# Patient Record
Sex: FEMALE | Race: WHITE | NOT HISPANIC OR LATINO | Employment: UNEMPLOYED | URBAN - METROPOLITAN AREA
[De-identification: names, ages, dates, MRNs, and addresses within clinical notes are randomized per-mention and may not be internally consistent; named-entity substitution may affect disease eponyms.]

---

## 2017-03-21 ENCOUNTER — TELEPHONE (OUTPATIENT)
Dept: OBSTETRICS AND GYNECOLOGY | Facility: CLINIC | Age: 52
End: 2017-03-21

## 2017-03-21 DIAGNOSIS — Z12.31 ENCOUNTER FOR SCREENING MAMMOGRAM FOR BREAST CANCER: Primary | ICD-10-CM

## 2017-03-21 NOTE — TELEPHONE ENCOUNTER
----- Message from Josefina Garcia MA sent at 3/21/2017  2:28 PM CDT -----  Contact: self  Kayla Gray  MRN: [id number]  : 1965  PCP: Kole Shelley  Home Phone      [phone]  Work Phone      Not on file.  Mobile          [phone]      MESSAGE:   Please link patient mammogram order to her appointment thanks

## 2017-04-03 ENCOUNTER — HOSPITAL ENCOUNTER (OUTPATIENT)
Dept: RADIOLOGY | Facility: HOSPITAL | Age: 52
Discharge: HOME | End: 2017-04-03
Attending: OBSTETRICS & GYNECOLOGY
Payer: COMMERCIAL

## 2017-04-03 ENCOUNTER — OFFICE VISIT (OUTPATIENT)
Dept: OBSTETRICS AND GYNECOLOGY | Facility: CLINIC | Age: 52
End: 2017-04-03
Payer: COMMERCIAL

## 2017-04-03 ENCOUNTER — PROCEDURE VISIT (OUTPATIENT)
Dept: OBSTETRICS AND GYNECOLOGY | Facility: CLINIC | Age: 52
End: 2017-04-03
Payer: COMMERCIAL

## 2017-04-03 VITALS
HEIGHT: 61 IN | DIASTOLIC BLOOD PRESSURE: 78 MMHG | HEART RATE: 84 BPM | SYSTOLIC BLOOD PRESSURE: 120 MMHG | WEIGHT: 132 LBS | BODY MASS INDEX: 24.92 KG/M2

## 2017-04-03 DIAGNOSIS — Z12.31 ENCOUNTER FOR SCREENING MAMMOGRAM FOR BREAST CANCER: ICD-10-CM

## 2017-04-03 DIAGNOSIS — N83.202 CYST OF LEFT OVARY: Primary | ICD-10-CM

## 2017-04-03 DIAGNOSIS — N83.202 CYST OF LEFT OVARY: ICD-10-CM

## 2017-04-03 PROCEDURE — 1160F RVW MEDS BY RX/DR IN RCRD: CPT | Mod: S$GLB,,, | Performed by: OBSTETRICS & GYNECOLOGY

## 2017-04-03 PROCEDURE — 99202 OFFICE O/P NEW SF 15 MIN: CPT | Mod: S$GLB,,, | Performed by: OBSTETRICS & GYNECOLOGY

## 2017-04-03 PROCEDURE — 99999 PR PBB SHADOW E&M-EST. PATIENT-LVL II: CPT | Mod: PBBFAC,,, | Performed by: OBSTETRICS & GYNECOLOGY

## 2017-04-03 PROCEDURE — 77067 SCR MAMMO BI INCL CAD: CPT | Mod: TC

## 2017-04-03 PROCEDURE — 77067 SCR MAMMO BI INCL CAD: CPT | Mod: 26,,, | Performed by: RADIOLOGY

## 2017-04-03 PROCEDURE — 77063 BREAST TOMOSYNTHESIS BI: CPT | Mod: 26,,, | Performed by: RADIOLOGY

## 2017-04-03 PROCEDURE — 76830 TRANSVAGINAL US NON-OB: CPT | Mod: S$GLB,,, | Performed by: OBSTETRICS & GYNECOLOGY

## 2017-04-03 NOTE — PROGRESS NOTES
Subjective:    Patient ID: Kayla Gray is a 52 y.o. y.o. female    Chief Complaint:   Chief Complaint   Patient presents with    Ovarian Cyst     mri        History of Present Illness:  Kayla presents today for evaluation of a left ovarian cyst identified on MRI done recently for bilateral hip pain. She is still having regular menses. She denies pain, heavy bleeding, pelvic pressure.       Review of Systems   Constitutional: Negative for activity change, appetite change, chills, diaphoresis, fatigue, fever and unexpected weight change.   HENT: Negative for mouth sores and tinnitus.    Eyes: Negative for discharge and visual disturbance.   Respiratory: Negative for cough, shortness of breath and wheezing.    Cardiovascular: Negative for chest pain, palpitations and leg swelling.   Gastrointestinal: Negative for abdominal pain, blood in stool, constipation, diarrhea, nausea and vomiting.   Endocrine: Negative for diabetes, hair loss, hot flashes, hyperthyroidism and hypothyroidism.   Genitourinary: Negative for decreased libido, dyspareunia, dysuria, flank pain, frequency, genital sores, hematuria, menorrhagia, menstrual problem, pelvic pain, urgency, vaginal bleeding, vaginal discharge, vaginal pain, urinary incontinence, postcoital bleeding and vaginal odor.   Musculoskeletal: Positive for joint swelling. Negative for back pain and myalgias.   Skin:  Negative for rash, no acne and hair changes.   Neurological: Negative for seizures, syncope, numbness and headaches.   Hematological: Negative for adenopathy. Does not bruise/bleed easily.   Psychiatric/Behavioral: Negative for sleep disturbance. The patient is not nervous/anxious.    Breast: Negative for breast pain and nipple discharge          Objective:    Vital Signs:  Vitals:    04/03/17 1352   BP: 120/78   Pulse: 84       Physical Exam:  General:  alert,normal appearing gravid female   Skin:  Skin color, texture, turgor normal. No rashes or lesions    Abdomen: soft, non-tender. Bowel sounds normal. No masses,  no organomegaly   Pelvis: External genitalia: normal general appearance  Urinary system: urethral meatus normal, bladder nontender  Vaginal: normal mucosa without prolapse or lesions  Cervix: normal appearance  Uterus: normal single, nontender  Adnexa: normal bimanual exam         Assessment:      1. Cyst of left ovary          Plan:      Cyst of left ovary  -     US OB/GYN Procedure ([facility]); Future; Expected date: 4/3/17

## 2017-04-04 ENCOUNTER — LAB VISIT (OUTPATIENT)
Dept: LAB | Facility: HOSPITAL | Age: 52
End: 2017-04-04
Attending: OBSTETRICS & GYNECOLOGY
Payer: COMMERCIAL

## 2017-04-04 DIAGNOSIS — N83.202 CYST OF LEFT OVARY: ICD-10-CM

## 2017-04-04 PROCEDURE — 36415 COLL VENOUS BLD VENIPUNCTURE: CPT

## 2017-04-04 PROCEDURE — 86304 IMMUNOASSAY TUMOR CA 125: CPT

## 2017-04-05 LAB — CANCER AG125 SERPL-ACNC: 65 U/ML

## 2017-04-21 ENCOUNTER — INITIAL CONSULT (OUTPATIENT)
Dept: GYNECOLOGIC ONCOLOGY | Facility: CLINIC | Age: 52
End: 2017-04-21
Attending: OBSTETRICS & GYNECOLOGY
Payer: COMMERCIAL

## 2017-04-21 ENCOUNTER — TELEPHONE (OUTPATIENT)
Dept: GYNECOLOGIC ONCOLOGY | Facility: CLINIC | Age: 52
End: 2017-04-21

## 2017-04-21 DIAGNOSIS — R19.00 PELVIC MASS IN FEMALE: Primary | ICD-10-CM

## 2017-04-21 DIAGNOSIS — R97.1 ELEVATED CA-125: ICD-10-CM

## 2017-04-21 PROCEDURE — 99245 OFF/OP CONSLTJ NEW/EST HI 55: CPT | Mod: S$GLB,,, | Performed by: OBSTETRICS & GYNECOLOGY

## 2017-04-21 NOTE — LETTER
April 21, 2017      Ashvin Chakraborty MD  [address] - Gynecologic Oncology  [address], [address]  [facility] [address]  Phone: [phone]  Fax: [phone]          Patient: Kayla Gray   MR Number: [id number]   YOB: 1965   Date of Visit: 4/21/2017       Dear Dr. Ashvin Chakraborty:    Thank you for referring Kayla Gray to me for evaluation. Attached you will find relevant portions of my assessment and plan of care.    If you have questions, please do not hesitate to call me. I look forward to following Kayla Gray along with you.    Sincerely,    Cirilo Gong MD    Enclosure  CC:  No Recipients    If you would like to receive this communication electronically, please contact [email or web address] or [phone] to request more information on [facility] access.    For providers and/or their staff who would like to refer a patient to [facility], please contact us through our one-stop-shop provider referral line, [facility], at [phone].    If you feel you have received this communication in error or would no longer like to receive these types of communications, please e-mail [email or web address]

## 2017-04-21 NOTE — Clinical Note
Alyssa Hamilton: Thanks for sending her.  Going to do her on 5/18. Will let you know about path. ALEXANDRA

## 2017-05-11 ENCOUNTER — HOSPITAL ENCOUNTER (OUTPATIENT)
Dept: PREADMISSION TESTING | Facility: OTHER | Age: 52
Discharge: HOME | End: 2017-05-11
Attending: OBSTETRICS & GYNECOLOGY
Payer: COMMERCIAL

## 2017-05-11 ENCOUNTER — ANESTHESIA EVENT (OUTPATIENT)
Dept: SURGERY | Facility: OTHER | Age: 52
End: 2017-05-11
Payer: COMMERCIAL

## 2017-05-11 VITALS
SYSTOLIC BLOOD PRESSURE: 142 MMHG | HEART RATE: 81 BPM | HEIGHT: 61 IN | DIASTOLIC BLOOD PRESSURE: 73 MMHG | WEIGHT: 130 LBS | TEMPERATURE: 99 F | BODY MASS INDEX: 24.55 KG/M2 | OXYGEN SATURATION: 99 %

## 2017-05-11 DIAGNOSIS — R19.00 PELVIC MASS IN FEMALE: Primary | ICD-10-CM

## 2017-05-11 LAB
ABO + RH BLD: NORMAL
ANISOCYTOSIS BLD QL SMEAR: SLIGHT
BASOPHILS # BLD AUTO: 0.08 K/UL
BASOPHILS NFR BLD: 1.7 %
BLD GP AB SCN CELLS X3 SERPL QL: NORMAL
DIFFERENTIAL METHOD: ABNORMAL
EOSINOPHIL # BLD AUTO: 0.7 K/UL
EOSINOPHIL NFR BLD: 13.9 %
ERYTHROCYTE [DISTWIDTH] IN BLOOD BY AUTOMATED COUNT: 18 %
HCT VFR BLD AUTO: 33.5 %
HGB BLD-MCNC: 9.7 G/DL
HYPOCHROMIA BLD QL SMEAR: ABNORMAL
LYMPHOCYTES # BLD AUTO: 1.6 K/UL
LYMPHOCYTES NFR BLD: 32.4 %
MCH RBC QN AUTO: 19.5 PG
MCHC RBC AUTO-ENTMCNC: 29 %
MCV RBC AUTO: 67 FL
MONOCYTES # BLD AUTO: 0.4 K/UL
MONOCYTES NFR BLD: 7.5 %
NEUTROPHILS # BLD AUTO: 2.1 K/UL
NEUTROPHILS NFR BLD: 44.5 %
OVALOCYTES BLD QL SMEAR: ABNORMAL
PLATELET # BLD AUTO: 283 K/UL
PLATELET BLD QL SMEAR: ABNORMAL
PMV BLD AUTO: 10 FL
POIKILOCYTOSIS BLD QL SMEAR: SLIGHT
RBC # BLD AUTO: 4.98 M/UL
WBC # BLD AUTO: 4.81 K/UL

## 2017-05-11 PROCEDURE — 86850 RBC ANTIBODY SCREEN: CPT

## 2017-05-11 PROCEDURE — 85025 COMPLETE CBC W/AUTO DIFF WBC: CPT

## 2017-05-11 PROCEDURE — 86900 BLOOD TYPING SEROLOGIC ABO: CPT

## 2017-05-11 PROCEDURE — 36415 COLL VENOUS BLD VENIPUNCTURE: CPT

## 2017-05-11 RX ORDER — PREGABALIN 75 MG/1
150 CAPSULE ORAL
Status: ACTIVE | OUTPATIENT
Start: 2017-05-11 | End: 2017-05-11

## 2017-05-11 RX ORDER — ALBUTEROL SULFATE 0.83 MG/ML
2.5 SOLUTION RESPIRATORY (INHALATION)
Status: CANCELLED | OUTPATIENT
Start: 2017-05-11 | End: 2017-05-11

## 2017-05-11 RX ORDER — SODIUM CHLORIDE, SODIUM LACTATE, POTASSIUM CHLORIDE, CALCIUM CHLORIDE 600; 310; 30; 20 MG/100ML; MG/100ML; MG/100ML; MG/100ML
INJECTION, SOLUTION INTRAVENOUS CONTINUOUS
Status: CANCELLED | OUTPATIENT
Start: 2017-05-11

## 2017-05-11 RX ORDER — FLUCONAZOLE 200 MG/1
200 TABLET ORAL DAILY
COMMUNITY
End: 2017-05-30

## 2017-05-11 RX ORDER — MIDAZOLAM HYDROCHLORIDE 5 MG/ML
2 INJECTION INTRAMUSCULAR; INTRAVENOUS
Status: CANCELLED | OUTPATIENT
Start: 2017-05-11 | End: 2017-05-11

## 2017-05-11 RX ORDER — LIDOCAINE HYDROCHLORIDE 10 MG/ML
1 INJECTION, SOLUTION EPIDURAL; INFILTRATION; INTRACAUDAL; PERINEURAL ONCE
Status: CANCELLED | OUTPATIENT
Start: 2017-05-11 | End: 2017-05-11

## 2017-05-11 NOTE — DISCHARGE INSTRUCTIONS
PRE-ADMIT TESTING -  [phone]    [address]  [facility]        OUTPATIENT SURGERY UNIT - [phone]    Your surgery has been scheduled at [facility]. We are pleased to have the opportunity to serve you. For Further Information please call [phone].    On the day of surgery please report to the Information Desk on the 1st floor.    CONTACT YOUR PHYSICIAN'S OFFICE THE DAY PRIOR TO YOUR SURGERY TO OBTAIN YOUR ARRIVAL TIME.     The evening before surgery do not eat anything after 9 p.m. ( this includes hard candy, chewing gum and mints).  You may only have GATORADE, POWERADE AND WATER  from 9 p.m. until you leave your home.   DO NOT DRINK ANY LIQUIDS ON THE WAY TO THE HOSPITAL.      SPECIAL MEDICATION INSTRUCTIONS: TAKE medications checked off by the Anesthesiologist on your Medication List.    Angiogram Patients: Take medications as instructed by your physician, including aspirin.     Surgery Patients:    If you take ASPIRIN - Your PHYSICIAN/SURGEON will need to inform you IF/OR when you need to stop taking aspirin prior to your surgery.     Do Not take any medications containing IBUPROFEN.  Do Not Wear any make-up or dark nail polish   (especially eye make-up) to surgery. If you come to surgery with makeup on you will be required to remove the makeup or nail polish.    Do not shave your surgical area at least 5 days prior to your surgery. The surgical prep will be performed at the hospital according to Infection Control regulations.    Leave all valuables at home.   Do Not wear any jewelry or watches, including any metal in body piercings.  Contact Lens must be removed before surgery. Either do not wear the contact lens or bring a case and solution for storage.  Please bring a container for eyeglasses or dentures as required.  Bring any paperwork your physician has provided, such as consent forms,  history and physicals, doctor's orders, etc.   Bring comfortable clothes that  are loose fitting to wear upon discharge. Take into consideration the type of surgery being performed.  Maintain your diet as advised per your physician the day prior to surgery.      Adequate rest the night before surgery is advised.   Park in the Parking lot behind the hospital or in the [facility] across the street from the parking lot. Parking is complimentary.  If you will be discharged the same day as your procedure, please arrange for a responsible adult to drive you home or to accompany you if traveling by taxi.   YOU WILL NOT BE PERMITTED TO DRIVE OR TO LEAVE THE HOSPITAL ALONE AFTER SURGERY.   It is strongly recommended that you arrange for someone to remain with you for the first 24 hrs following your surgery.       Thank you for your cooperation.  The Staff of [facility].        Bathing Instructions                                                                 Please shower the evening before and morning of your procedure with    ANTIBACTERIAL SOAP. ( DIAL, etc )  Concentrate on the surgical area   for at least 3 minutes and rinse completely. Dry off as usual.   Do not use any deodorant, powder, body lotions, perfume, after shave or    cologne.

## 2017-05-11 NOTE — ANESTHESIA PREPROCEDURE EVALUATION
05/11/2017  Kayla Gray is a 52 y.o., female.    Anesthesia Evaluation    I have reviewed the Patient Summary Reports.    I have reviewed the Nursing Notes.   I have reviewed the Medications.     Review of Systems  Anesthesia Hx:  No problems with previous Anesthesia  History of prior surgery of interest to airway management or planning: Previous anesthesia: Epidural  CS with epidural.    Social:  Non-Smoker    Hematology/Oncology:         -- Anemia (hb 9.7): Oncology Comments: Pelvic mass Elevated Ca 125    EENT/Dental:EENT/Dental Normal   Cardiovascular:   Exercise tolerance: good    Pulmonary:   Asthma mild    Renal/:  Renal/ Normal     Hepatic/GI:   Hiatal Hernia, GERD, well controlled Has has 2 eso dilitations   Musculoskeletal:   Aseptic necrosis rt fem head   Neurological:  Neurology Normal    Endocrine:  Endocrine Normal    Dermatological:  Skin Normal    Psych:  Psychiatric Normal           Physical Exam  General:  Well nourished    Airway/Jaw/Neck:  Airway Findings: General Airway Assessment: Possible difficult intubation Mallampati: II  TM Distance: 4 - 6 cm      Dental:  Dental Findings: In tact        Mental Status:  Mental Status Findings:  Cooperative, Alert and Oriented         Anesthesia Plan  Type of Anesthesia, risks & benefits discussed:  Anesthesia Type:  general  Patient's Preference:   Intra-op Monitoring Plan:   Intra-op Monitoring Plan Comments:   Post Op Pain Control Plan:   Post Op Pain Control Plan Comments:   Induction:   IV  Beta Blocker:         Informed Consent: Patient understands risks and agrees with Anesthesia plan.  Questions answered. Anesthesia consent signed with patient.  ASA Score: 2     Day of Surgery Review of History & Physical:    H&P update referred to the surgeon.     Anesthesia Plan Notes: CBC today,asthma    Day of surgery update: hb  9.7        Ready For Surgery From Anesthesia Perspective.

## 2017-05-11 NOTE — IP AVS SNAPSHOT
[facility] Location ([facility])  [address] [place], [place] [address]  Phone: [phone]           Patient Discharge Instructions  Our goal is to set you up for success. This packet includes information on your condition, medications, and your home care. It will help you care for yourself to prevent having to return to the hospital.     Please ask your nurse if you have any questions.      There are many details to remember when preparing for your surgery. Here is what you will need to do, please ask your nurse if there are more specific instructions and if you have any questions:    1. Before procedure Do not smoke or drink alcoholic beverages 24 hours prior to your procedure. Do not eat or drink anything 8 hours before your procedure - this includes gum, mints, and candy.     2. Day of procedure Please remove all jewelry for the procedure. If you wear contact lenses, dentures, hearing aids or glasses, bring a container to put them in during your surgery and give to a family member.  If your doctor has scheduled you for an overnight stay, bring a small overnight bag with any personal items that you need.      3. After procedure  Make arrangements in advance for transportation home by a responsible adult. It is not safe to drive a vehicle during the 24 hours following surgery.     PLEASE NOTE: You may be contacted the day before your surgery to confirm your surgery date and arrival time. The Surgery schedule has many variables which may affect the time of your surgery case. Family members should be available if your surgery time changes.           [facility]  Unless otherwise directed by your provider, please   contact [facility], our nurse care line   that is available for 24/7 assistance.     [phone] (toll-free)     Registered nurses in the [facility]   provide: appointment scheduling, clinical advisement, health education, and other advisory services.                  **  Verify the list of medication(s) below is accurate and up to date. Carry this with you in case of emergency. If your medications have changed, please notify your healthcare provider.             Medication List      TAKE these medications        Additional Info                      albuterol 90 mcg/actuation inhaler   Commonly known as:  VENTOLIN HFA   Quantity:  18 g   Refills:  1   Dose:  2 puff    Instructions:  Inhale 2 puffs into the lungs every 6 (six) hours as needed.     Begin Date    AM    Noon    PM    Bedtime       esomeprazole 40 MG capsule   Commonly known as:  NEXIUM   Quantity:  90 capsule   Refills:  1   Dose:  40 mg    Instructions:  Take 1 capsule (40 mg total) by mouth before breakfast.     Begin Date    AM    Noon    PM    Bedtime       fluconazole 200 MG Tab   Commonly known as:  DIFLUCAN   Refills:  0   Dose:  200 mg    Instructions:  Take 200 mg by mouth once daily. X 3 days     Begin Date    AM    Noon    PM    Bedtime       fluticasone 50 mcg/actuation nasal spray   Commonly known as:  FLONASE   Quantity:  16 g   Refills:  3   Dose:  2 spray    Instructions:  2 sprays by Each Nare route once daily.     Begin Date    AM    Noon    PM    Bedtime                  Please bring to all follow up appointments:    1. A copy of your discharge instructions.  2. All medicines you are currently taking in their original bottles.  3. Identification and insurance card.    Please arrive 15 minutes ahead of scheduled appointment time.    Please call 24 hours in advance if you must reschedule your appointment and/or time.        Your Scheduled Appointments     Jun 07, 2017  2:30 PM CDT   New Patient with Esau Foster DO   [facility] ([facility])    [address] [place] [id number] [phone]              Your Future Surgeries/Procedures     May 18, 2017   Surgery with Cirilo Gong MD   [facility] ([facility])    [address]  [facility] [address]   [phone]                  Discharge Instructions       PRE-ADMIT TESTING -  [phone]    [address]  [facility]        OUTPATIENT SURGERY UNIT - [phone]    Your surgery has been scheduled at [facility]. We are pleased to have the opportunity to serve you. For Further Information please call [phone].    On the day of surgery please report to the Information Desk on the 1st floor.    CONTACT YOUR PHYSICIAN'S OFFICE THE DAY PRIOR TO YOUR SURGERY TO OBTAIN YOUR ARRIVAL TIME.     The evening before surgery do not eat anything after 9 p.m. ( this includes hard candy, chewing gum and mints).  You may only have GATORADE, POWERADE AND WATER  from 9 p.m. until you leave your home.   DO NOT DRINK ANY LIQUIDS ON THE WAY TO THE HOSPITAL.      SPECIAL MEDICATION INSTRUCTIONS: TAKE medications checked off by the Anesthesiologist on your Medication List.    Angiogram Patients: Take medications as instructed by your physician, including aspirin.     Surgery Patients:    If you take ASPIRIN - Your PHYSICIAN/SURGEON will need to inform you IF/OR when you need to stop taking aspirin prior to your surgery.     Do Not take any medications containing IBUPROFEN.  Do Not Wear any make-up or dark nail polish   (especially eye make-up) to surgery. If you come to surgery with makeup on you will be required to remove the makeup or nail polish.    Do not shave your surgical area at least 5 days prior to your surgery. The surgical prep will be performed at the hospital according to Infection Control regulations.    Leave all valuables at home.   Do Not wear any jewelry or watches, including any metal in body piercings.  Contact Lens must be removed before surgery. Either do not wear the contact lens or bring a case and solution for storage.  Please bring a container for eyeglasses or dentures as required.  Bring any paperwork your physician has provided,  "such as consent forms,  history and physicals, doctor's orders, etc.   Bring comfortable clothes that are loose fitting to wear upon discharge. Take into consideration the type of surgery being performed.  Maintain your diet as advised per your physician the day prior to surgery.      Adequate rest the night before surgery is advised.   Park in the Parking lot behind the hospital or in the [facility] across the street from the parking lot. Parking is complimentary.  If you will be discharged the same day as your procedure, please arrange for a responsible adult to drive you home or to accompany you if traveling by taxi.   YOU WILL NOT BE PERMITTED TO DRIVE OR TO LEAVE THE HOSPITAL ALONE AFTER SURGERY.   It is strongly recommended that you arrange for someone to remain with you for the first 24 hrs following your surgery.       Thank you for your cooperation.  The Staff of [facility].        Bathing Instructions                                                                 Please shower the evening before and morning of your procedure with    ANTIBACTERIAL SOAP. ( DIAL, etc )  Concentrate on the surgical area   for at least 3 minutes and rinse completely. Dry off as usual.   Do not use any deodorant, powder, body lotions, perfume, after shave or    cologne.                                                Admission Information     Date & Time Provider Department CSN    5/11/2017  9:00 AM Cirilo Gong MD [facility] [id number]      Care Providers     Provider Role Specialty Primary office phone    Cirilo Gong MD Attending Provider Obstetrics [phone]      Your Vitals Were     BP Pulse Temp Height Weight SpO2    142/73 81 98.8 °F (37.1 °C) (Oral) 5' 1" (1.549 m) 59 kg (130 lb) 99%    BMI                24.56 kg/m2          Recent Lab Values     No lab values to display.      Allergies as of 5/11/2017        Reactions    Asa [Aspirin] Swelling    Codeine Nausea " And Vomiting    Patient not sure what she can take      Advance Directives     An advance directive is a document which, in the event you are no longer able to make decisions for yourself, tells your healthcare team what kind of treatment you do or do not want to receive, or who you would like to make those decisions for you.  If you do not currently have an advance directive, [facility] encourages you to create one.  For more information call:  [phone]), [phone] ([phone]),  or log on to [email or web address].        Language Assistance Services     ATTENTION: Language assistance services are available, free of charge. Please call [phone].      ATENCIÓN: Si mariella sarabiacole, tiene a gil disposición servicios gratuitos de asistencia lingüística. Llame al [phone].     [facility] Ý: N?u b?n nói Ti?ng Vi?t, có các d?ch v? h? tr? ngôn ng? mi?n phí dành cho b?n. G?i s? [phone].         [facility] complies with applicable Federal civil rights laws and does not discriminate on the basis of race, color, national origin, age, disability, or sex.

## 2017-05-11 NOTE — PRE ADMISSION SCREENING
Spoke with [facility] in Dr. Gong' office and informed patient diagnosed with yeast infection.  Patient wants to know if she can start her Diflucan.  Per Dr. Gong, okay to start today and will proceed with surgery on 5/18.

## 2017-05-18 ENCOUNTER — ANESTHESIA (OUTPATIENT)
Dept: SURGERY | Facility: OTHER | Age: 52
End: 2017-05-18
Payer: COMMERCIAL

## 2017-05-18 ENCOUNTER — HOSPITAL ENCOUNTER (OUTPATIENT)
Facility: OTHER | Age: 52
Discharge: HOME | End: 2017-05-19
Attending: OBSTETRICS & GYNECOLOGY | Admitting: OBSTETRICS & GYNECOLOGY
Payer: COMMERCIAL

## 2017-05-18 ENCOUNTER — SURGERY (OUTPATIENT)
Age: 52
End: 2017-05-18

## 2017-05-18 DIAGNOSIS — R19.00 PELVIC MASS: ICD-10-CM

## 2017-05-18 DIAGNOSIS — R19.00 PELVIC MASS IN FEMALE: Primary | ICD-10-CM

## 2017-05-18 LAB
B-HCG UR QL: NEGATIVE
CTP QC/QA: YES

## 2017-05-18 PROCEDURE — 27201423 OPTIME MED/SURG SUP & DEVICES STERILE SUPPLY: Performed by: OBSTETRICS & GYNECOLOGY

## 2017-05-18 PROCEDURE — 63600175 PHARM REV CODE 636 W HCPCS: Performed by: NURSE ANESTHETIST, CERTIFIED REGISTERED

## 2017-05-18 PROCEDURE — 58573 TLH W/T/O UTERUS OVER 250 G: CPT | Mod: AS,,, | Performed by: PHYSICIAN ASSISTANT

## 2017-05-18 PROCEDURE — 37000008 HC ANESTHESIA 1ST 15 MINUTES: Performed by: OBSTETRICS & GYNECOLOGY

## 2017-05-18 PROCEDURE — 88307 TISSUE EXAM BY PATHOLOGIST: CPT | Mod: 26,,, | Performed by: PATHOLOGY

## 2017-05-18 PROCEDURE — 81025 URINE PREGNANCY TEST: CPT | Performed by: ANESTHESIOLOGY

## 2017-05-18 PROCEDURE — 94799 UNLISTED PULMONARY SVC/PX: CPT

## 2017-05-18 PROCEDURE — 37000009 HC ANESTHESIA EA ADD 15 MINS: Performed by: OBSTETRICS & GYNECOLOGY

## 2017-05-18 PROCEDURE — 71000039 HC RECOVERY, EACH ADD'L HOUR: Performed by: OBSTETRICS & GYNECOLOGY

## 2017-05-18 PROCEDURE — 36000713 HC OR TIME LEV V EA ADD 15 MIN: Performed by: OBSTETRICS & GYNECOLOGY

## 2017-05-18 PROCEDURE — 27100025 HC TUBING, SET FLUID WARMER: Performed by: NURSE ANESTHETIST, CERTIFIED REGISTERED

## 2017-05-18 PROCEDURE — 25000003 PHARM REV CODE 250: Performed by: OBSTETRICS & GYNECOLOGY

## 2017-05-18 PROCEDURE — 25000003 PHARM REV CODE 250: Performed by: ANESTHESIOLOGY

## 2017-05-18 PROCEDURE — 63600175 PHARM REV CODE 636 W HCPCS: Performed by: ANESTHESIOLOGY

## 2017-05-18 PROCEDURE — 63600175 PHARM REV CODE 636 W HCPCS: Performed by: PHYSICIAN ASSISTANT

## 2017-05-18 PROCEDURE — 25000003 PHARM REV CODE 250: Performed by: NURSE ANESTHETIST, CERTIFIED REGISTERED

## 2017-05-18 PROCEDURE — 71000033 HC RECOVERY, INTIAL HOUR: Performed by: OBSTETRICS & GYNECOLOGY

## 2017-05-18 PROCEDURE — 99900035 HC TECH TIME PER 15 MIN (STAT)

## 2017-05-18 PROCEDURE — 25000242 PHARM REV CODE 250 ALT 637 W/ HCPCS: Performed by: ANESTHESIOLOGY

## 2017-05-18 PROCEDURE — 94640 AIRWAY INHALATION TREATMENT: CPT

## 2017-05-18 PROCEDURE — 88307 TISSUE EXAM BY PATHOLOGIST: CPT | Performed by: PATHOLOGY

## 2017-05-18 PROCEDURE — 36000712 HC OR TIME LEV V 1ST 15 MIN: Performed by: OBSTETRICS & GYNECOLOGY

## 2017-05-18 PROCEDURE — 58662 LAPAROSCOPY EXCISE LESIONS: CPT | Mod: AS,51,, | Performed by: PHYSICIAN ASSISTANT

## 2017-05-18 PROCEDURE — 58662 LAPAROSCOPY EXCISE LESIONS: CPT | Mod: 51,,, | Performed by: OBSTETRICS & GYNECOLOGY

## 2017-05-18 PROCEDURE — 58573 TLH W/T/O UTERUS OVER 250 G: CPT | Mod: ,,, | Performed by: OBSTETRICS & GYNECOLOGY

## 2017-05-18 RX ORDER — SODIUM CHLORIDE, SODIUM LACTATE, POTASSIUM CHLORIDE, CALCIUM CHLORIDE 600; 310; 30; 20 MG/100ML; MG/100ML; MG/100ML; MG/100ML
INJECTION, SOLUTION INTRAVENOUS CONTINUOUS
Status: DISCONTINUED | OUTPATIENT
Start: 2017-05-18 | End: 2017-05-18

## 2017-05-18 RX ORDER — SIMETHICONE 80 MG
80 TABLET,CHEWABLE ORAL EVERY 4 HOURS PRN
Status: DISCONTINUED | OUTPATIENT
Start: 2017-05-18 | End: 2017-05-19 | Stop reason: HOSPADM

## 2017-05-18 RX ORDER — ALBUTEROL SULFATE 90 UG/1
2 AEROSOL, METERED RESPIRATORY (INHALATION) EVERY 4 HOURS PRN
Status: DISCONTINUED | OUTPATIENT
Start: 2017-05-18 | End: 2017-05-19 | Stop reason: HOSPADM

## 2017-05-18 RX ORDER — SODIUM CHLORIDE 9 MG/ML
INJECTION, SOLUTION INTRAVENOUS CONTINUOUS
Status: DISCONTINUED | OUTPATIENT
Start: 2017-05-18 | End: 2017-05-19

## 2017-05-18 RX ORDER — ROCURONIUM BROMIDE 10 MG/ML
INJECTION, SOLUTION INTRAVENOUS
Status: DISCONTINUED | OUTPATIENT
Start: 2017-05-18 | End: 2017-05-18

## 2017-05-18 RX ORDER — FENTANYL CITRATE 50 UG/ML
25 INJECTION, SOLUTION INTRAMUSCULAR; INTRAVENOUS EVERY 5 MIN PRN
Status: DISCONTINUED | OUTPATIENT
Start: 2017-05-18 | End: 2017-05-18 | Stop reason: HOSPADM

## 2017-05-18 RX ORDER — MIDAZOLAM HYDROCHLORIDE 5 MG/ML
2 INJECTION INTRAMUSCULAR; INTRAVENOUS
Status: COMPLETED | OUTPATIENT
Start: 2017-05-18 | End: 2017-05-18

## 2017-05-18 RX ORDER — OXYCODONE AND ACETAMINOPHEN 10; 325 MG/1; MG/1
1 TABLET ORAL EVERY 4 HOURS PRN
Status: DISCONTINUED | OUTPATIENT
Start: 2017-05-18 | End: 2017-05-19

## 2017-05-18 RX ORDER — PHENYLEPHRINE HYDROCHLORIDE 10 MG/ML
INJECTION INTRAVENOUS
Status: DISCONTINUED | OUTPATIENT
Start: 2017-05-18 | End: 2017-05-18

## 2017-05-18 RX ORDER — METOPROLOL TARTRATE 1 MG/ML
INJECTION, SOLUTION INTRAVENOUS
Status: DISCONTINUED | OUTPATIENT
Start: 2017-05-18 | End: 2017-05-18

## 2017-05-18 RX ORDER — FENTANYL CITRATE 50 UG/ML
INJECTION, SOLUTION INTRAMUSCULAR; INTRAVENOUS
Status: DISCONTINUED | OUTPATIENT
Start: 2017-05-18 | End: 2017-05-18

## 2017-05-18 RX ORDER — ONDANSETRON 8 MG/1
8 TABLET, ORALLY DISINTEGRATING ORAL EVERY 8 HOURS PRN
Status: DISCONTINUED | OUTPATIENT
Start: 2017-05-18 | End: 2017-05-19 | Stop reason: HOSPADM

## 2017-05-18 RX ORDER — PANTOPRAZOLE SODIUM 40 MG/1
40 TABLET, DELAYED RELEASE ORAL DAILY
Status: DISCONTINUED | OUTPATIENT
Start: 2017-05-19 | End: 2017-05-19 | Stop reason: HOSPADM

## 2017-05-18 RX ORDER — LIDOCAINE HYDROCHLORIDE 10 MG/ML
1 INJECTION, SOLUTION EPIDURAL; INFILTRATION; INTRACAUDAL; PERINEURAL ONCE
Status: DISCONTINUED | OUTPATIENT
Start: 2017-05-18 | End: 2017-05-18 | Stop reason: HOSPADM

## 2017-05-18 RX ORDER — HYDROMORPHONE HYDROCHLORIDE 1 MG/ML
0.5 INJECTION, SOLUTION INTRAMUSCULAR; INTRAVENOUS; SUBCUTANEOUS
Status: DISCONTINUED | OUTPATIENT
Start: 2017-05-18 | End: 2017-05-19 | Stop reason: HOSPADM

## 2017-05-18 RX ORDER — CEFAZOLIN SODIUM 2 G/50ML
2 SOLUTION INTRAVENOUS
Status: COMPLETED | OUTPATIENT
Start: 2017-05-18 | End: 2017-05-18

## 2017-05-18 RX ORDER — DIPHENHYDRAMINE HCL 25 MG
25 CAPSULE ORAL EVERY 4 HOURS PRN
Status: DISCONTINUED | OUTPATIENT
Start: 2017-05-18 | End: 2017-05-19 | Stop reason: HOSPADM

## 2017-05-18 RX ORDER — FLUTICASONE PROPIONATE 50 MCG
2 SPRAY, SUSPENSION (ML) NASAL DAILY
Status: DISCONTINUED | OUTPATIENT
Start: 2017-05-19 | End: 2017-05-19 | Stop reason: HOSPADM

## 2017-05-18 RX ORDER — SODIUM CHLORIDE 0.9 % (FLUSH) 0.9 %
3 SYRINGE (ML) INJECTION
Status: DISCONTINUED | OUTPATIENT
Start: 2017-05-18 | End: 2017-05-18

## 2017-05-18 RX ORDER — MEPERIDINE HYDROCHLORIDE 50 MG/ML
12.5 INJECTION INTRAMUSCULAR; INTRAVENOUS; SUBCUTANEOUS ONCE AS NEEDED
Status: DISCONTINUED | OUTPATIENT
Start: 2017-05-18 | End: 2017-05-18 | Stop reason: HOSPADM

## 2017-05-18 RX ORDER — DEXAMETHASONE SODIUM PHOSPHATE 4 MG/ML
INJECTION, SOLUTION INTRA-ARTICULAR; INTRALESIONAL; INTRAMUSCULAR; INTRAVENOUS; SOFT TISSUE
Status: DISCONTINUED | OUTPATIENT
Start: 2017-05-18 | End: 2017-05-18

## 2017-05-18 RX ORDER — ONDANSETRON 2 MG/ML
INJECTION INTRAMUSCULAR; INTRAVENOUS
Status: DISCONTINUED | OUTPATIENT
Start: 2017-05-18 | End: 2017-05-18

## 2017-05-18 RX ORDER — ALBUTEROL SULFATE 0.83 MG/ML
2.5 SOLUTION RESPIRATORY (INHALATION)
Status: COMPLETED | OUTPATIENT
Start: 2017-05-18 | End: 2017-05-18

## 2017-05-18 RX ORDER — OXYCODONE AND ACETAMINOPHEN 5; 325 MG/1; MG/1
1 TABLET ORAL EVERY 4 HOURS PRN
Status: DISCONTINUED | OUTPATIENT
Start: 2017-05-18 | End: 2017-05-19

## 2017-05-18 RX ORDER — OXYCODONE HYDROCHLORIDE 5 MG/1
5 TABLET ORAL
Status: DISCONTINUED | OUTPATIENT
Start: 2017-05-18 | End: 2017-05-18 | Stop reason: HOSPADM

## 2017-05-18 RX ORDER — ALBUTEROL SULFATE 90 UG/1
2 AEROSOL, METERED RESPIRATORY (INHALATION) EVERY 4 HOURS PRN
Status: DISCONTINUED | OUTPATIENT
Start: 2017-05-18 | End: 2017-05-18 | Stop reason: SDUPTHER

## 2017-05-18 RX ORDER — GLYCOPYRROLATE 0.2 MG/ML
INJECTION INTRAMUSCULAR; INTRAVENOUS
Status: DISCONTINUED | OUTPATIENT
Start: 2017-05-18 | End: 2017-05-18

## 2017-05-18 RX ORDER — PROPOFOL 10 MG/ML
VIAL (ML) INTRAVENOUS
Status: DISCONTINUED | OUTPATIENT
Start: 2017-05-18 | End: 2017-05-18

## 2017-05-18 RX ORDER — HYDROMORPHONE HYDROCHLORIDE 2 MG/ML
0.4 INJECTION, SOLUTION INTRAMUSCULAR; INTRAVENOUS; SUBCUTANEOUS EVERY 5 MIN PRN
Status: DISCONTINUED | OUTPATIENT
Start: 2017-05-18 | End: 2017-05-18 | Stop reason: HOSPADM

## 2017-05-18 RX ORDER — LIDOCAINE HYDROCHLORIDE 10 MG/ML
INJECTION, SOLUTION INTRAVENOUS
Status: DISCONTINUED | OUTPATIENT
Start: 2017-05-18 | End: 2017-05-18

## 2017-05-18 RX ORDER — IBUPROFEN 600 MG/1
600 TABLET ORAL EVERY 6 HOURS
Status: DISCONTINUED | OUTPATIENT
Start: 2017-05-18 | End: 2017-05-19 | Stop reason: HOSPADM

## 2017-05-18 RX ORDER — ONDANSETRON 2 MG/ML
4 INJECTION INTRAMUSCULAR; INTRAVENOUS ONCE AS NEEDED
Status: DISCONTINUED | OUTPATIENT
Start: 2017-05-18 | End: 2017-05-18 | Stop reason: HOSPADM

## 2017-05-18 RX ORDER — ACETAMINOPHEN 10 MG/ML
INJECTION, SOLUTION INTRAVENOUS
Status: DISCONTINUED | OUTPATIENT
Start: 2017-05-18 | End: 2017-05-18

## 2017-05-18 RX ORDER — NEOSTIGMINE METHYLSULFATE 1 MG/ML
INJECTION, SOLUTION INTRAVENOUS
Status: DISCONTINUED | OUTPATIENT
Start: 2017-05-18 | End: 2017-05-18

## 2017-05-18 RX ORDER — DEXTROSE MONOHYDRATE, SODIUM CHLORIDE, AND POTASSIUM CHLORIDE 50; 1.49; 4.5 G/1000ML; G/1000ML; G/1000ML
INJECTION, SOLUTION INTRAVENOUS CONTINUOUS
Status: DISCONTINUED | OUTPATIENT
Start: 2017-05-18 | End: 2017-05-19

## 2017-05-18 RX ADMIN — METOPROLOL TARTRATE 2.5 MG: 5 INJECTION, SOLUTION INTRAVENOUS at 10:05

## 2017-05-18 RX ADMIN — PHENYLEPHRINE HYDROCHLORIDE 100 MCG: 10 INJECTION INTRAVENOUS at 11:05

## 2017-05-18 RX ADMIN — FENTANYL CITRATE 50 MCG: 50 INJECTION, SOLUTION INTRAMUSCULAR; INTRAVENOUS at 10:05

## 2017-05-18 RX ADMIN — HYDROMORPHONE HYDROCHLORIDE 0.4 MG: 2 INJECTION INTRAMUSCULAR; INTRAVENOUS; SUBCUTANEOUS at 12:05

## 2017-05-18 RX ADMIN — ACETAMINOPHEN 1000 MG: 10 INJECTION, SOLUTION INTRAVENOUS at 10:05

## 2017-05-18 RX ADMIN — PROPOFOL 170 MG: 10 INJECTION, EMULSION INTRAVENOUS at 09:05

## 2017-05-18 RX ADMIN — PROPOFOL 30 MG: 10 INJECTION, EMULSION INTRAVENOUS at 11:05

## 2017-05-18 RX ADMIN — ONDANSETRON 4 MG: 2 INJECTION INTRAMUSCULAR; INTRAVENOUS at 11:05

## 2017-05-18 RX ADMIN — PHENYLEPHRINE HYDROCHLORIDE 100 MCG: 10 INJECTION INTRAVENOUS at 10:05

## 2017-05-18 RX ADMIN — CARBOXYMETHYLCELLULOSE SODIUM 2 DROP: 2.5 SOLUTION/ DROPS OPHTHALMIC at 09:05

## 2017-05-18 RX ADMIN — FENTANYL CITRATE 50 MCG: 50 INJECTION, SOLUTION INTRAMUSCULAR; INTRAVENOUS at 11:05

## 2017-05-18 RX ADMIN — GLYCOPYRROLATE 0.6 MG: 0.2 INJECTION, SOLUTION INTRAMUSCULAR; INTRAVENOUS at 11:05

## 2017-05-18 RX ADMIN — LIDOCAINE HYDROCHLORIDE 80 MG: 10 INJECTION, SOLUTION INTRAVENOUS at 09:05

## 2017-05-18 RX ADMIN — ROCURONIUM BROMIDE 30 MG: 10 INJECTION, SOLUTION INTRAVENOUS at 09:05

## 2017-05-18 RX ADMIN — SODIUM CHLORIDE, SODIUM LACTATE, POTASSIUM CHLORIDE, AND CALCIUM CHLORIDE: 600; 310; 30; 20 INJECTION, SOLUTION INTRAVENOUS at 09:05

## 2017-05-18 RX ADMIN — SODIUM CHLORIDE, SODIUM LACTATE, POTASSIUM CHLORIDE, AND CALCIUM CHLORIDE: 600; 310; 30; 20 INJECTION, SOLUTION INTRAVENOUS at 11:05

## 2017-05-18 RX ADMIN — FENTANYL CITRATE 100 MCG: 50 INJECTION, SOLUTION INTRAMUSCULAR; INTRAVENOUS at 09:05

## 2017-05-18 RX ADMIN — DEXTROSE MONOHYDRATE, SODIUM CHLORIDE, AND POTASSIUM CHLORIDE: 50; 4.5; 1.49 INJECTION, SOLUTION INTRAVENOUS at 04:05

## 2017-05-18 RX ADMIN — ALBUTEROL SULFATE 2.5 MG: 2.5 SOLUTION RESPIRATORY (INHALATION) at 08:05

## 2017-05-18 RX ADMIN — PROPOFOL 30 MG: 10 INJECTION, EMULSION INTRAVENOUS at 10:05

## 2017-05-18 RX ADMIN — CEFAZOLIN SODIUM 2 G: 2 SOLUTION INTRAVENOUS at 09:05

## 2017-05-18 RX ADMIN — PHENYLEPHRINE HYDROCHLORIDE 200 MCG: 10 INJECTION INTRAVENOUS at 10:05

## 2017-05-18 RX ADMIN — MIDAZOLAM HYDROCHLORIDE 2 MG: 5 INJECTION, SOLUTION INTRAMUSCULAR; INTRAVENOUS at 08:05

## 2017-05-18 RX ADMIN — NEOSTIGMINE METHYLSULFATE 5 MG: 1 INJECTION INTRAVENOUS at 11:05

## 2017-05-18 RX ADMIN — ROCURONIUM BROMIDE 10 MG: 10 INJECTION, SOLUTION INTRAVENOUS at 10:05

## 2017-05-18 RX ADMIN — SIMETHICONE CHEW TAB 80 MG 80 MG: 80 TABLET ORAL at 02:05

## 2017-05-18 RX ADMIN — DEXAMETHASONE SODIUM PHOSPHATE 4 MG: 4 INJECTION, SOLUTION INTRAMUSCULAR; INTRAVENOUS at 10:05

## 2017-05-18 RX ADMIN — ONDANSETRON 8 MG: 8 TABLET, ORALLY DISINTEGRATING ORAL at 02:05

## 2017-05-18 RX ADMIN — PROPOFOL 40 MG: 10 INJECTION, EMULSION INTRAVENOUS at 10:05

## 2017-05-18 NOTE — TRANSFER OF CARE
"Anesthesia Transfer of Care Note    Patient: Kayla Gray    Procedure(s) Performed: Procedure(s) (LRB):  XI ROBOTIC ASSISTED LAPAROSCOPIC HYSTERECTOMY (N/A)  XI ROBOT ASSISTED LAPAROSCOPIC SALPINGO-OOPHERECTOMY (Bilateral)    Patient location: PACU    Anesthesia Type: general    Transport from OR: Transported from OR on 2-3 L/min O2 by NC with adequate spontaneous ventilation    Post pain: adequate analgesia    Post assessment: no apparent anesthetic complications    Post vital signs: stable    Level of consciousness: sedated    Nausea/Vomiting: no nausea/vomiting    Complications: none    Transfer of care protocol was followed      Last vitals:   Visit Vitals    BP (!) 156/74    Pulse 86    Temp 36.7 °C (98 °F)    Resp 18    Ht 5' 1" (1.549 m)    Wt 59 kg (130 lb)    SpO2 98%    BMI 24.56 kg/m2     "

## 2017-05-18 NOTE — INTERVAL H&P NOTE
The patient has been examined and the H&P has been reviewed:    I concur with the findings and no changes have occurred since H&P was written.    Surgery risks, benefits and alternative options discussed and understood by patient.    Active Hospital Problems    Diagnosis  POA    Pelvic mass [R19.00]  Yes      Resolved Hospital Problems    Diagnosis Date Resolved POA   No resolved problems to display.     Faby Banda PA-C  Gynecologic Oncology  [phone]

## 2017-05-18 NOTE — OPERATIVE NOTE ADDENDUM
Certification of Assistant at Surgery       Surgery Date: 5/18/2017     Participating Surgeons:  Surgeon(s) and Role:     * Cirilo Gong MD - Primary     * Jostin Sanders MD - Resident - Assisting  *Faby Banda PA-C - Assisting    Procedures:  Procedure(s) (LRB):  XI ROBOTIC ASSISTED LAPAROSCOPIC HYSTERECTOMY (N/A)  XI ROBOT ASSISTED LAPAROSCOPIC SALPINGO-OOPHERECTOMY (Bilateral)    Assistant Surgeon's Certification of Necessity:  I understand that section 1842 (b) (6) (d) of the Social Security Act generally prohibits Medicare Part B reasonable charge payment for the services of assistants at surgery in teaching hospitals when qualified residents are available to furnish such services. I certify that the services for which payment is claimed were medically necessary, and that no qualified resident was available to perform the services. I further understand that these services are subject to post-payment review by the Medicare carrier.      Faby Banda PA-C    05/18/2017  12:01 PM

## 2017-05-18 NOTE — PLAN OF CARE
05/18/17 1714   Discharge Assessment   Assessment Type Discharge Planning Assessment   Confirmed/corrected address and phone number on facesheet? Yes   Assessment information obtained from? Patient;Caregiver;Medical Record   Communicated expected length of stay with patient/caregiver yes   Prior to hospitilization cognitive status: Alert/Oriented   Prior to hospitalization functional status: Independent   Current cognitive status: Alert/Oriented   Current Functional Status: Independent   Arrived From home or self-care  (MIKE Echeverria)   Lives With spouse   Able to Return to Prior Arrangements yes   Is patient able to care for self after discharge? Yes   How many people do you have in your home that can help with your care after discharge? 1   Patient currently being followed by outpatient case management? No   Patient currently receives home health services? No   Does the patient currently use HME? No   Discharge Plan A Home with family   Patient/Family In Agreement With Plan yes

## 2017-05-18 NOTE — IP AVS SNAPSHOT
[facility] Location ([facility])  [address] [place], [place] [id number]  Phone: [phone]           Patient Discharge Instructions   Our goal is to set you up for success. This packet includes information on your condition, medications, and your home care.  It will help you care for yourself to prevent having to return to the hospital.     Please ask your nurse if you have any questions.      There are many details to remember when preparing to leave the hospital. Here is what you will need to do:    1. Take your medicine. If you are prescribed medications, review your Medication List on the following pages. You may have new medications to  at the pharmacy and others that you'll need to stop taking. Review the instructions for how and when to take your medications. Talk with your doctor or nurses if you are unsure of what to do.     2. Go to your follow-up appointments. Specific follow-up information is listed in the following pages. Your may be contacted by a nurse or clinical provider about future appointments. Be sure we have all of the phone numbers to reach you. Please contact your provider's office if you are unable to make an appointment.     3. Watch for warning signs. Your doctor or nurse will give you detailed warning signs to watch for and when to call for assistance. These instructions may also include educational information about your condition. If you experience any of warning signs to your health, call your doctor.               ** Verify the list of medication(s) below is accurate and up to date. Carry this with you in case of emergency. If your medications have changed, please notify your healthcare provider.             Medication List      START taking these medications        Additional Info                      tramadol 50 mg tablet   Commonly known as:  ULTRAM   Quantity:  30 tablet   Refills:  0   Dose:  50 mg    Last time this was given:  50 mg on 5/19/2017  2:11 PM    Instructions:  Take 1 tablet (50 mg total) by mouth every 6 (six) hours as needed for Pain.     Begin Date    AM    Noon    PM    Bedtime         CHANGE how you take these medications        Additional Info                      esomeprazole 40 MG capsule   Commonly known as:  NEXIUM   Quantity:  90 capsule   Refills:  1   Dose:  40 mg   What changed:    - when to take this  - reasons to take this    Instructions:  Take 1 capsule (40 mg total) by mouth before breakfast.     Begin Date    AM    Noon    PM    Bedtime       fluticasone 50 mcg/actuation nasal spray   Commonly known as:  FLONASE   Quantity:  16 g   Refills:  3   Dose:  2 spray   What changed:    - when to take this  - reasons to take this    Last time this was given:  2 sprays on 5/19/2017  9:58 AM   Instructions:  2 sprays by Each Nare route once daily.     Begin Date    AM    Noon    PM    Bedtime         CONTINUE taking these medications        Additional Info                      albuterol 90 mcg/actuation inhaler   Commonly known as:  VENTOLIN HFA   Quantity:  18 g   Refills:  1   Dose:  2 puff    Instructions:  Inhale 2 puffs into the lungs every 6 (six) hours as needed.     Begin Date    AM    Noon    PM    Bedtime       fluconazole 200 MG Tab   Commonly known as:  DIFLUCAN   Refills:  0   Dose:  200 mg    Instructions:  Take 200 mg by mouth once daily. X 3 days     Begin Date    AM    Noon    PM    Bedtime         STOP taking these medications     fluticasone-salmeterol 250-50 mcg/dose 250-50 mcg/dose diskus inhaler   Commonly known as:  ADVAIR            Where to Get Your Medications      You can get these medications from any pharmacy     Bring a paper prescription for each of these medications     tramadol 50 mg tablet                  Please bring to all follow up appointments:    1. A copy of your discharge instructions.  2. All medicines you are currently taking in their original bottles.  3. Identification and insurance card.    Please  arrive 15 minutes ahead of scheduled appointment time.    Please call 24 hours in advance if you must reschedule your appointment and/or time.        Your Scheduled Appointments     Jun 07, 2017  2:30 PM CDT   New Patient with Esau Foster DO   [facility] ([facility])    [address]  [place] [id number] [phone]              Follow-up Information     Follow up with Cirilo Gong MD. Schedule an appointment as soon as possible for a visit in 3 weeks.    Specialties:  Obstetrics, Gynecology, Gynecologic Oncology    Why:  post op    Contact information:    [address]  [facility] [address]  [phone]          Discharge Instructions     Future Orders    Activity as tolerated     Call MD for:  difficulty breathing or increased cough     Call MD for:  increased confusion or weakness     Call MD for:  persistent dizziness, light-headedness, or visual disturbances     Call MD for:  persistent nausea and vomiting or diarrhea     Call MD for:  redness, tenderness, or signs of infection (pain, swelling, redness, odor or green/yellow discharge around incision site)     Call MD for:  severe persistent headache     Call MD for:  severe uncontrolled pain     Call MD for:  temperature >100.4     Diet general     Questions:    Total calories:      Fat restriction, if any:      Protein restriction, if any:      Na restriction, if any:      Fluid restriction:      Additional restrictions:          Discharge Instructions           Thank you for choosing [facility] as your Health Care Provider. [facility] strives to provide the best healthcare available to you. In the next few days you may receive a Survey, either by mail or email,  asking you to rate our care that was provided to you during your stay.  Please return the survey to us, as your feedback is important. We aim to meet your expectations of safe, quality health care.    From your [facility]  "Health Care Team.    Discharge References/Attachments     HYSTERECTOMY (LAPAROSCOPIC), DISCHARGE INSTRUCTIONS (ENGLISH)    TRAMADOL TABLETS (ENGLISH)        Primary Diagnosis     Your primary diagnosis was:  History Of Hysterectomy      Admission Information     Date & Time Provider Department CSN    5/18/2017  7:19 AM Cirilo Gong MD [facility] [id number]      Care Providers     Provider Role Specialty Primary office phone    Cirilo Gong MD Attending Provider Obstetrics [phone]    Cirilo Gong MD Surgeon  Obstetrics [phone]      Your Vitals Were     BP Pulse Temp Resp Height Weight    130/63 (BP Location: Left arm, Patient Position: Lying, BP Method: Automatic) 97 98.9 °F (37.2 °C) (Oral) 18 5' 1" (1.549 m) 59 kg (130 lb)    SpO2 BMI             97% 24.56 kg/m2         Recent Lab Values     No lab values to display.      Pending Labs     Order Current Status    Specimen to Pathology - Surgery In process      Allergies as of 5/19/2017        Reactions    Asa [Aspirin] Swelling    Codeine Nausea And Vomiting    Patient not sure what she can take      [facility]     [facility] Nurse Care Line - 24/7 Assistance  Unless otherwise directed by your provider, please contact [facility], our nurse care line that is available for 24/7 assistance.     Registered nurses in the [facility] Center provide clinical advisement, health education, appointment booking, and other advisory services.  Call for this free service at [phone].        Advance Directives     An advance directive is a document which, in the event you are no longer able to make decisions for yourself, tells your healthcare team what kind of treatment you do or do not want to receive, or who you would like to make those decisions for you.  If you do not currently have an advance directive, [facility] encourages you to create one.  For more information call:  [phone]), [phone] ([phone]),  or log on to [email or web address].        Language Assistance Services     ATTENTION: Language assistance services are available, free of charge. Please call [phone].      ATENCIÓN: Si habla lan, tiene a gil disposición servicios gratuitos de asistencia lingüística. Llame al [phone].     CHÚ Ý: N?u b?n nói Ti?ng Vi?t, có các d?ch v? h? tr? ngôn ng? mi?n phí dành cho b?n. G?i s? [phone].         [facility] complies with applicable Federal civil rights laws and does not discriminate on the basis of race, color, national origin, age, disability, or sex.

## 2017-05-18 NOTE — PLAN OF CARE
Patient prefers to have    Tima  present for discharge teaching. Please contact them @[phone].

## 2017-05-18 NOTE — H&P (VIEW-ONLY)
Subjective:      Patient ID: Kayla Gray is a 52 y.o. female.    Chief Complaint: elevated ca125 (Referred by Dr. Chakraborty)      HPI  Presents today at the request of Dr. Chakraborty secondary to an ovarian cyst recently detected on MRI for hip pain evaluation.  In addition, she also had a CT A/P that revealed the same, with no evidence of ascites or carcinomatosis.  Exam in his office was not concerning for cancer, and U/S revealed a small fibroid uterus, normal right ovary, and an 8 cm simple, unilocular cyst of the left ovary.  She had no free fluid or other concerning findings on U/S.  CA-125 was performed and was elevated at 65.  She denies abdominal complaints, early satiety, or changes in bladder or bowel function.  She still has menses, with LMP 3/27/18 , and takes OCP's that she started 3 weeks ago in an attempt to decrease the size of the ovarian cyst.  Only abdominal surgery was C/S x 4.  Only pertinent FH is a PGM diagnosed with  breast cancer.  No h/o ovarian cancers.  Review of Systems   Constitutional: Negative for activity change, appetite change, chills, fatigue and fever.   HENT: Negative for hearing loss, mouth sores, nosebleeds, sore throat and tinnitus.    Eyes: Negative for visual disturbance.   Respiratory: Negative for cough, chest tightness, shortness of breath and wheezing.    Cardiovascular: Negative for chest pain and leg swelling.   Gastrointestinal: Positive for diarrhea. Negative for abdominal distention, abdominal pain, blood in stool, constipation, nausea and vomiting.   Genitourinary: Negative for dysuria, flank pain, frequency, hematuria, pelvic pain, vaginal bleeding, vaginal discharge and vaginal pain.   Musculoskeletal: Negative for arthralgias and back pain.   Skin: Negative for rash.   Neurological: Negative for dizziness, seizures, syncope, weakness and numbness.   Hematological: Does not bruise/bleed easily.   Psychiatric/Behavioral: Negative for confusion and sleep  disturbance. The patient is not nervous/anxious.         Past Medical History:   Diagnosis Date    Asthma     Avascular necrosis of bone of right hip     GERD (gastroesophageal reflux disease)      Past Surgical History:   Procedure Laterality Date     SECTION, CLASSIC       Family History   Problem Relation Age of Onset    Heart disease Father     Cancer Maternal Grandfather     Cancer Paternal Grandmother     Heart disease Paternal Grandmother      Social History     Social History    Marital status:      Spouse name: N/A    Number of children: N/A    Years of education: N/A     Occupational History    Not on file.     Social History Main Topics    Smoking status: Never Smoker    Smokeless tobacco: Not on file    Alcohol use No    Drug use: No    Sexual activity: Not on file     Other Topics Concern    Not on file     Social History Narrative     Current Outpatient Prescriptions   Medication Sig    albuterol (VENTOLIN HFA) 90 mcg/actuation inhaler Inhale 2 puffs into the lungs every 6 (six) hours as needed.    esomeprazole (NEXIUM) 40 MG capsule Take 1 capsule (40 mg total) by mouth before breakfast.    fluticasone (FLONASE) 50 mcg/actuation nasal spray 2 sprays by Each Nare route once daily.    norethindrone-ethinyl estradiol (NECON 35, 28,) 1-35 mg-mcg per tablet Take 1 tablet by mouth once daily.    budesonide (PULMICORT) 0.5 mg/2 mL nebulizer solution Take 2 mLs (0.5 mg total) by nebulization once daily.    levalbuterol (XOPENEX) 1.25 mg/0.5 mL nebulizer solution Take 0.5 mLs (1.25 mg total) by nebulization every 4 (four) hours as needed for Wheezing.    [DISCONTINUED] fluticasone-salmeterol 250-50 mcg/dose (ADVAIR) 250-50 mcg/dose diskus inhaler Inhale 1 puff into the lungs 2 (two) times daily.     No current facility-administered medications for this visit.      Review of patient's allergies indicates:   Allergen Reactions    Asa [aspirin] Swelling    Codeine  Nausea And Vomiting       Objective:   Physical Exam:   Constitutional: She is oriented to person, place, and time. She appears well-developed and well-nourished. No distress.    HENT:   Head: Normocephalic and atraumatic.    Eyes: No scleral icterus.    Neck: Normal range of motion. Neck supple.    Cardiovascular: Normal rate and intact distal pulses.  Exam reveals no cyanosis and no edema.     Pulmonary/Chest: Effort normal. No respiratory distress. She exhibits no tenderness.        Abdominal: Soft. Normal appearance. She exhibits no distension, no fluid wave, no ascites and no mass (healed pfannensteil incision). There is no tenderness. There is no rigidity, no rebound and no guarding. No hernia.         Genitourinary: Rectum normal and vagina normal. Pelvic exam was performed with patient supine. There is no rash, tenderness or lesion on the right labia. There is no rash, tenderness or lesion on the left labia. Uterus is deviated, enlarged and hosting fibroids. Uterus is not fixed, not tender and not experiencing uterine prolapse. Cervix is normal. Right adnexum displays no mass, no tenderness and no fullness. Left adnexum displays fullness. Left adnexum displays no mass and no tenderness. No bleeding in the vagina. No vaginal discharge found. Labial bartholins normal.Cervix exhibits no motion tenderness, no discharge and no friability.        Uterus Size: 10 cm   Musculoskeletal: Normal range of motion and moves all extremeties. She exhibits no edema.      Lymphadenopathy:     She has no cervical adenopathy.        Right: No inguinal adenopathy present.        Left: No inguinal adenopathy present.    Neurological: She is alert and oriented to person, place, and time.    Skin: Skin is warm. No rash noted. No cyanosis or erythema.    Psychiatric: She has a normal mood and affect. Thought content normal.       Assessment:     1. Pelvic mass in female    2. Elevated CA-125        Plan:       Counseled the patient  on exam, lab, and imaging results.  Risk of malignancy is less than 5% with a unilocular cyst based on the [place] data.  I feel she has reasons for a mildly elevated CA-125 including fibroids as well as menstruation.  However, given her age and size greater than 5 cm, I did recommend surgery to definitively diagnose and treat her.  We discussed RALH/BSO/poss staging. The risks, benefits, and indications of the procedure were discussed with the patient and her .  These included bleeding, infection, damage to surrounding tissues, conversion to laparotomy, and the possibility of major complications including death.  She voiced understanding, all questions were answered and consents were signed.

## 2017-05-19 VITALS
HEIGHT: 61 IN | OXYGEN SATURATION: 97 % | SYSTOLIC BLOOD PRESSURE: 130 MMHG | BODY MASS INDEX: 24.55 KG/M2 | TEMPERATURE: 99 F | HEART RATE: 97 BPM | WEIGHT: 130 LBS | DIASTOLIC BLOOD PRESSURE: 63 MMHG | RESPIRATION RATE: 18 BRPM

## 2017-05-19 PROBLEM — Z90.710 S/P LAPAROSCOPIC HYSTERECTOMY: Status: ACTIVE | Noted: 2017-05-19

## 2017-05-19 PROCEDURE — 94799 UNLISTED PULMONARY SVC/PX: CPT

## 2017-05-19 PROCEDURE — 99900035 HC TECH TIME PER 15 MIN (STAT)

## 2017-05-19 PROCEDURE — 25000003 PHARM REV CODE 250: Performed by: OBSTETRICS & GYNECOLOGY

## 2017-05-19 RX ORDER — TRAMADOL HYDROCHLORIDE 50 MG/1
50 TABLET ORAL EVERY 6 HOURS PRN
Qty: 30 TABLET | Refills: 0 | Status: SHIPPED | OUTPATIENT
Start: 2017-05-19 | End: 2017-05-29

## 2017-05-19 RX ORDER — TRAMADOL HYDROCHLORIDE 50 MG/1
100 TABLET ORAL EVERY 6 HOURS PRN
Status: DISCONTINUED | OUTPATIENT
Start: 2017-05-19 | End: 2017-05-19 | Stop reason: HOSPADM

## 2017-05-19 RX ORDER — ACETAMINOPHEN 325 MG/1
650 TABLET ORAL EVERY 6 HOURS PRN
Status: DISCONTINUED | OUTPATIENT
Start: 2017-05-19 | End: 2017-05-19 | Stop reason: HOSPADM

## 2017-05-19 RX ORDER — TRAMADOL HYDROCHLORIDE 50 MG/1
50 TABLET ORAL EVERY 6 HOURS PRN
Status: DISCONTINUED | OUTPATIENT
Start: 2017-05-19 | End: 2017-05-19 | Stop reason: HOSPADM

## 2017-05-19 RX ORDER — ACETAMINOPHEN 325 MG/1
650 TABLET ORAL EVERY 4 HOURS PRN
Status: DISCONTINUED | OUTPATIENT
Start: 2017-05-19 | End: 2017-05-19

## 2017-05-19 RX ADMIN — DEXTROSE MONOHYDRATE, SODIUM CHLORIDE, AND POTASSIUM CHLORIDE: 50; 4.5; 1.49 INJECTION, SOLUTION INTRAVENOUS at 01:05

## 2017-05-19 RX ADMIN — FLUTICASONE PROPIONATE 2 SPRAY: 50 SPRAY, METERED NASAL at 09:05

## 2017-05-19 RX ADMIN — TRAMADOL HYDROCHLORIDE 50 MG: 50 TABLET, COATED ORAL at 02:05

## 2017-05-19 RX ADMIN — PANTOPRAZOLE SODIUM 40 MG: 40 TABLET, DELAYED RELEASE ORAL at 02:05

## 2017-05-19 NOTE — PROGRESS NOTES
Patient tolerating diet, ambulating around unit, pain controlled, voiding.  Discharge instructions reviewed with patient and spouse, all questions answered.  Iv removed and dressed with coban and gauze.  New medications and follow up appointments reviewed with patient.  Patient and spouse verbalized understanding.  Wheelchair transport requested for patient.

## 2017-05-19 NOTE — PLAN OF CARE
05/19/17 1520   Final Note   Assessment Type Final Discharge Note   Discharge Disposition Home   Discharge planning education complete? Yes   Hospital Follow Up  Appt(s) scheduled? Yes   Discharge plans and expectations educations in teach back method with documentation complete? Yes   Offered [facility] Bedside Delivery? n/a   Discharge/Hospital Encounter Summary to (non-[facility]) PCP Yes   Referral to Outpatient Case Management complete? n/a   Referral to / orders for Home Health Complete? n/a   30 day supply of medicines given at discharge, if documented non-compliance / non-adherence? n/a   Any social issues identified prior to discharge? n/a   Did you assess the readiness or willingness of the family or caregiver to support self management of care? Yes

## 2017-05-19 NOTE — PROGRESS NOTES
[facility]  Gynecologic Oncology  Progress Note      Patient Name: Kayla Gray  MRN: [id number]  Admission Date: 5/18/2017  Hospital Length of Stay: 0 days  Attending Provider: Cirilo Gong MD  Primary Care Provider: Kole Shelley MD  Principal Problem: Pelvic mass    Follow-up For: Procedure(s) (LRB):  XI ROBOTIC ASSISTED LAPAROSCOPIC HYSTERECTOMY (N/A)  XI ROBOT ASSISTED LAPAROSCOPIC SALPINGO-OOPHERECTOMY (Bilateral)  Post-Operative Day: 1 Day Post-Op  Subjective:      History of Present Illness:  Ms. Gray is a 52 yr old female, POD#1 s/p RATLH/BSO 2/2 adnexal mass    Hospital Course:  Ms. Gray is a 52 yr old female, POD#1 s/p RATLH/BSO 2/2 left adnexal mass. She tolerated the procedure well without complications. Please see operative note for further details. Her postop course was uncomplicated. She was discharged home on POD#1 once ambulating, voiding, tolerating PO and pain was well controlled. She will follow up with Dr. Gong in 4 weeks for a postop visit.    Interval History: Pt seen and examined. No acute events overnight. Some nausea postop but currently denies. Has tolerated clears. Cortes in place. Draining clear urine. UOP adequate overnight. Has not ambulated. Has not required PRN pain meds. Denies flatus or BM.    Scheduled Meds:   fluticasone  2 spray Each Nare Daily    ibuprofen  600 mg Oral Q6H    pantoprazole  40 mg Oral Daily     Continuous Infusions:   PRN Meds:acetaminophen, albuterol **AND** MDI Q4H PRN, diphenhydrAMINE, HYDROmorphone, ondansetron, oxycodone-acetaminophen, oxycodone-acetaminophen, promethazine (PHENERGAN) IVPB, simethicone    Review of patient's allergies indicates:   Allergen Reactions    Asa [aspirin] Swelling    Codeine Nausea And Vomiting     Patient not sure what she can take       Objective:     Vital Signs (Most Recent):  Temp: 98.7 °F (37.1 °C) (05/19/17 0704)  Pulse: 96 (05/19/17 0756)  Resp: 18 (05/19/17 0756)  BP: (!)  144/80 (05/19/17 0704)  SpO2: 99 % (05/19/17 0756) Vital Signs (24h Range):  Temp:  [97.6 °F (36.4 °C)-98.7 °F (37.1 °C)] 98.7 °F (37.1 °C)  Pulse:  [] 96  Resp:  [16-18] 18  SpO2:  [96 %-100 %] 99 %  BP: (136-164)/(67-84) 144/80     Weight: 59 kg (130 lb)  Body mass index is 24.56 kg/(m^2).    Intake/Output - Last 3 Shifts       05/17 0700 - 05/18 0659 05/18 0700 - 05/19 0659 05/19 0700 - 05/20 0659    I.V. (mL/kg)  3158.3 (53.6)     Total Intake(mL/kg)  3158.3 (53.6)     Urine (mL/kg/hr)  2925     Blood  100     Total Output   3025      Net   +133.3                      Physical Exam:   Constitutional: She is oriented to person, place, and time. She appears well-developed and well-nourished.    HENT:   Head: Normocephalic and atraumatic.      Cardiovascular: Normal rate, regular rhythm and normal heart sounds.     Pulmonary/Chest: Effort normal and breath sounds normal.        Abdominal:   Soft, non-distended, approp TTP. +BS. No rebound or guarding.             Musculoskeletal: Normal range of motion and moves all extremeties.       Neurological: She is alert and oriented to person, place, and time.    Skin: Skin is warm and dry.        Lines/Drains/Airways     Drain                 Urethral Catheter 05/18/17 0991 Straight-tip 16 Fr. less than 1 day          Peripheral Intravenous Line                 Peripheral IV - Single Lumen 05/18/17 0813 less than 1 day                Laboratory:  No AM labs        Assessment/Plan:     Asthma  Sats stable on RA  Continue albuterol PRN and fluticasone QD    GERD (gastroesophageal reflux disease)  Stable. No acute issues  Continue protonix QD    S/P laparoscopic hysterectomy with BSO- robotic assisted  Continue routine postop care  Declines narcotics. Discussed pain regimen upon discharge. Cannot take NSAIDs. Will ensure adequate pain control prior to discharge  Cortes in place. Draining clear urine. UOP adequate. DC Cortes with passive voiding trial  Some nausea postop.  Currently denies. Tolerating clears. ADAT. Saline lock IV once tolerating  Encourage ambulation  TEDs/SCDs    VTE Risk Mitigation         Ordered     Medium Risk of VTE  Once      05/18/17 1426     Place NURYS hose  Until discontinued      05/18/17 1426     Place sequential compression device  Until discontinued      05/18/17 1426          Was coates catheter removed? Yes    Jostin Sanders MD  Gynecologic Oncology  [facility]

## 2017-05-19 NOTE — PLAN OF CARE
Patient Name: Kayla Gray  MRN: [id number]  Admission Date: 5/18/2017  Hospital Length of Stay: 1 day  Attending Provider: Cirilo Gong MD  Primary Care Provider: Kole Shelley MD  Principal Problem: Pelvic mass     Follow-up For: Procedure(s) (LRB):  XI ROBOTIC ASSISTED LAPAROSCOPIC HYSTERECTOMY (N/A)  XI ROBOT ASSISTED LAPAROSCOPIC SALPINGO-OOPHERECTOMY (Bilateral)  Post-Operative Day: 1 Day Post-Op        Discharge Planning:  Patient admitted on 5-18-17  Chart reviewed today  Care plan discussed with treatment team      Current dispo - home soon (Mrs Infante is from [place], [place])  Case management to follow as needed  Consults following are: urology, case mgt.

## 2017-05-19 NOTE — PLAN OF CARE
05/19/17 1249   Discharge Reassessment   Assessment Type Discharge Planning Reassessment   Can the patient answer the patient profile reliably? Yes, cognitively intact   How does the patient rate their overall health at the present time? Good   Describe the patient's ability to walk at the present time. Minor restrictions or changes   How often would a person be available to care for the patient? Whenever needed   Number of comorbid conditions (as recorded on the chart) One   During the past month, has the patient often been bothered by feeling down, depressed or hopeless? No   During the past month, has the patient often been bothered by little interest or pleasure in doing things? No   Discharge plan remains the same: Yes   Involved the patient/caregiver in establishing a new discharge plan: Yes

## 2017-05-19 NOTE — PLAN OF CARE
Problem: Patient Care Overview  Goal: Plan of Care Review  Outcome: Ongoing (interventions implemented as appropriate)  No significant events overnight. Remains free from fall, injury, and skin breakdown. Voiding per coates. VSS on RA throughout the night. Denies pain. Incision/dressing CDI.  Plan of care reviewed with patient and all questions answered. Bed low, locked. Call light within reach. Purposeful rounding performed. Resting comfortably in bed, no other complaints at this time.

## 2017-05-19 NOTE — PLAN OF CARE
Problem: Patient Care Overview  Goal: Plan of Care Review  Outcome: Ongoing (interventions implemented as appropriate)  Patient on RA. No PRN Inhalers needed at this time. No distress. Will continue to monitor.

## 2017-05-19 NOTE — SUBJECTIVE & OBJECTIVE
Interval History: Pt seen and examined. No acute events overnight. Some nausea postop but currently denies. Has tolerated clears. Cortes in place. Draining clear urine. UOP adequate overnight. Has not ambulated. Has not required PRN pain meds. Denies flatus or BM.    Scheduled Meds:   fluticasone  2 spray Each Nare Daily    ibuprofen  600 mg Oral Q6H    pantoprazole  40 mg Oral Daily     Continuous Infusions:   PRN Meds:acetaminophen, albuterol **AND** MDI Q4H PRN, diphenhydrAMINE, HYDROmorphone, ondansetron, oxycodone-acetaminophen, oxycodone-acetaminophen, promethazine (PHENERGAN) IVPB, simethicone    Review of patient's allergies indicates:   Allergen Reactions    Asa [aspirin] Swelling    Codeine Nausea And Vomiting     Patient not sure what she can take       Objective:     Vital Signs (Most Recent):  Temp: 98.7 °F (37.1 °C) (05/19/17 0704)  Pulse: 96 (05/19/17 0756)  Resp: 18 (05/19/17 0756)  BP: (!) 144/80 (05/19/17 0704)  SpO2: 99 % (05/19/17 0756) Vital Signs (24h Range):  Temp:  [97.6 °F (36.4 °C)-98.7 °F (37.1 °C)] 98.7 °F (37.1 °C)  Pulse:  [] 96  Resp:  [16-18] 18  SpO2:  [96 %-100 %] 99 %  BP: (136-164)/(67-84) 144/80     Weight: 59 kg (130 lb)  Body mass index is 24.56 kg/(m^2).    Intake/Output - Last 3 Shifts       05/17 0700 - 05/18 0659 05/18 0700 - 05/19 0659 05/19 0700 - 05/20 0659    I.V. (mL/kg)  3158.3 (53.6)     Total Intake(mL/kg)  3158.3 (53.6)     Urine (mL/kg/hr)  2925     Blood  100     Total Output   3025      Net   +133.3                      Physical Exam:   Constitutional: She is oriented to person, place, and time. She appears well-developed and well-nourished.    HENT:   Head: Normocephalic and atraumatic.      Cardiovascular: Normal rate, regular rhythm and normal heart sounds.     Pulmonary/Chest: Effort normal and breath sounds normal.        Abdominal:   Soft, non-distended, approp TTP. +BS. No rebound or guarding.             Musculoskeletal: Normal range of motion  and moves all extremeties.       Neurological: She is alert and oriented to person, place, and time.    Skin: Skin is warm and dry.        Lines/Drains/Airways     Drain                 Urethral Catheter 05/18/17 0954 Straight-tip 16 Fr. less than 1 day          Peripheral Intravenous Line                 Peripheral IV - Single Lumen 05/18/17 0851 less than 1 day                Laboratory:  No AM labs

## 2017-05-19 NOTE — ASSESSMENT & PLAN NOTE
Continue routine postop care  Declines narcotics. Discussed pain regimen upon discharge. Cannot take NSAIDs. Will ensure adequate pain control prior to discharge  Cortes in place. Draining clear urine. UOP adequate. DC Cortes with passive voiding trial  Some nausea postop. Currently denies. Tolerating clears. ADAT. Saline lock IV once tolerating  Encourage ambulation  TEDs/SCDs

## 2017-05-22 NOTE — DISCHARGE SUMMARY
[facility]  Obstetrics & Gynecology  Discharge Summary    Patient Name: Kayla Gray  MRN: [id number]  Admission Date: 5/18/2017  Hospital Length of Stay: 0 days  Discharge Date and Time: 5/19/2017  4:55 PM  Attending Physician: No att. providers found   Discharging Provider: Jam Armijo MD  Primary Care Provider: Kole Shelley MD    HPI: 52 yr old female, POD#1 s/p RATLH/BSO 2/2 adnexal mass    Hospital Course: Patient presented for scheduled procedure. Patient was passed back to OR for robotic assisted TLH. Please see OP note for further details. Tolerated procedure well and patient was taken to recovery in a stable condition. Prior to discharge patient was able to void, ambulate, tolerate PO and pain was well controlled with PO meds. Patient was given tramadol due to unwillingness to accept narcotic pain medications. Patient was given routine post-op instructions for which patient voiced understanding. Patient was subsequently discharged home.      Procedure(s) (LRB):  XI ROBOTIC ASSISTED LAPAROSCOPIC HYSTERECTOMY (N/A)  XI ROBOT ASSISTED LAPAROSCOPIC SALPINGO-OOPHERECTOMY (Bilateral)      Consults: None    Significant Diagnostic Studies:   Labs: CBC No results for input(s): WBC, HGB, HCT, PLT in the last 48 hours.  Specimen (12h ago through future)    None          Pending Diagnostic Studies:     None        Final Active Diagnoses:    Diagnosis Date Noted POA    PRINCIPAL PROBLEM:  S/P laparoscopic hysterectomy with BSO- robotic assisted [Z90.710] 05/19/2017 Yes    GERD (gastroesophageal reflux disease) [K21.9]  Yes    Asthma [J45.909]  Yes      Problems Resolved During this Admission:    Diagnosis Date Noted Date Resolved POA        Discharged Condition: good    Disposition: Home or Self Care    Follow Up:  Follow-up Information     Cirilo Gong MD. Schedule an appointment as soon as possible for a visit in 3 weeks.    Specialties:  Obstetrics, Gynecology, Gynecologic  Oncology  Why:  post op  Contact information:  [address]  [facility] [address]  [phone]                 Patient Instructions:     Diet general     Activity as tolerated     Call MD for:  temperature >100.4     Call MD for:  persistent nausea and vomiting or diarrhea     Call MD for:  severe uncontrolled pain     Call MD for:  redness, tenderness, or signs of infection (pain, swelling, redness, odor or green/yellow discharge around incision site)     Call MD for:  difficulty breathing or increased cough     Call MD for:  severe persistent headache     Call MD for:  persistent dizziness, light-headedness, or visual disturbances     Call MD for:  increased confusion or weakness       Medications:  Reconciled Home Medications:   Discharge Medication List as of 5/19/2017  3:46 PM      START taking these medications    Details   tramadol (ULTRAM) 50 mg tablet Take 1 tablet (50 mg total) by mouth every 6 (six) hours as needed for Pain., Starting 5/19/2017, Until Mon 5/29/17, Print         CONTINUE these medications which have NOT CHANGED    Details   albuterol (VENTOLIN HFA) 90 mcg/actuation inhaler Inhale 2 puffs into the lungs every 6 (six) hours as needed., Starting 6/4/2014, Until Discontinued, Normal      esomeprazole (NEXIUM) 40 MG capsule Take 1 capsule (40 mg total) by mouth before breakfast., Starting 6/4/2014, Until Discontinued, Normal      fluconazole (DIFLUCAN) 200 MG Tab Take 200 mg by mouth once daily. X 3 days , Until Discontinued, Historical Med      fluticasone (FLONASE) 50 mcg/actuation nasal spray 2 sprays by Each Nare route once daily., Starting 6/4/2014, Until Discontinued, Normal         STOP taking these medications       fluticasone-salmeterol 250-50 mcg/dose (ADVAIR) 250-50 mcg/dose diskus inhaler Comments:   Reason for Stopping:               Jam Armijo MD  Obstetrics & Gynecology  [facility]

## 2017-05-23 ENCOUNTER — TELEPHONE (OUTPATIENT)
Dept: GYNECOLOGIC ONCOLOGY | Facility: CLINIC | Age: 52
End: 2017-05-23

## 2017-05-23 NOTE — TELEPHONE ENCOUNTER
Spoke to the patient and gave her pathology results.  She is doing well and is without issue since surgery.  Due to see me in 2 weeks.

## 2017-05-30 ENCOUNTER — OFFICE VISIT (OUTPATIENT)
Dept: GYNECOLOGIC ONCOLOGY | Facility: CLINIC | Age: 52
End: 2017-05-30
Payer: COMMERCIAL

## 2017-05-30 ENCOUNTER — TELEPHONE (OUTPATIENT)
Dept: GYNECOLOGIC ONCOLOGY | Facility: CLINIC | Age: 52
End: 2017-05-30

## 2017-05-30 VITALS
HEART RATE: 92 BPM | DIASTOLIC BLOOD PRESSURE: 67 MMHG | HEIGHT: 61 IN | BODY MASS INDEX: 23.73 KG/M2 | SYSTOLIC BLOOD PRESSURE: 138 MMHG | WEIGHT: 125.69 LBS

## 2017-05-30 VITALS
SYSTOLIC BLOOD PRESSURE: 138 MMHG | BODY MASS INDEX: 23.74 KG/M2 | WEIGHT: 125.69 LBS | HEART RATE: 92 BPM | DIASTOLIC BLOOD PRESSURE: 97 MMHG

## 2017-05-30 DIAGNOSIS — N93.9 VAGINAL BLEEDING: Primary | ICD-10-CM

## 2017-05-30 DIAGNOSIS — Z90.710 S/P LAPAROSCOPIC HYSTERECTOMY: Primary | ICD-10-CM

## 2017-05-30 PROCEDURE — 99499 UNLISTED E&M SERVICE: CPT | Mod: S$GLB,,, | Performed by: OBSTETRICS & GYNECOLOGY

## 2017-05-30 PROCEDURE — 99024 POSTOP FOLLOW-UP VISIT: CPT | Mod: S$GLB,,, | Performed by: OBSTETRICS & GYNECOLOGY

## 2017-05-30 PROCEDURE — 99999 PR PBB SHADOW E&M-EST. PATIENT-LVL II: CPT | Mod: PBBFAC,,, | Performed by: OBSTETRICS & GYNECOLOGY

## 2017-05-30 PROCEDURE — 99999 PR PBB SHADOW E&M-EST. PATIENT-LVL III: CPT | Mod: PBBFAC,,, | Performed by: OBSTETRICS & GYNECOLOGY

## 2017-05-30 RX ORDER — NORETHINDRONE AND ETHINYL ESTRADIOL 1 MG-35MCG
KIT ORAL
COMMUNITY
Start: 2017-05-23 | End: 2017-05-30

## 2017-05-30 RX ORDER — AMOXICILLIN 500 MG/1
CAPSULE ORAL
Refills: 0 | COMMUNITY
Start: 2017-05-05 | End: 2017-05-30

## 2017-05-30 RX ORDER — DIAZEPAM 10 MG/1
TABLET ORAL
COMMUNITY
Start: 2017-03-16 | End: 2017-05-30

## 2017-05-30 RX ORDER — HYDROCODONE BITARTRATE AND ACETAMINOPHEN 5; 325 MG/1; MG/1
1 TABLET ORAL EVERY 6 HOURS PRN
Refills: 0 | COMMUNITY
Start: 2017-05-03 | End: 2017-05-30

## 2017-05-30 NOTE — TELEPHONE ENCOUNTER
05/30/17  rec'd pc from pt with c/o bright red blood vaginal bleeding since yesterday. Pt denies any cramps, clots, fever or pain. Dr. Aldrich will exam pt in clinic today. JORJE/MA

## 2017-05-30 NOTE — PROGRESS NOTES
Subjective:      Patient ID: Kayla Gray is a 52 y.o. female.    Chief Complaint: Post-op Evaluation (c/o bright red vaginal bleeding)      HPI  Here today due to VB that she called in about.  Noticed BRB per vagina yesterday, stopped today.  Denies other symptoms.  Review of Systems     Objective:   Physical Exam:             Abdominal: She exhibits no distension (incisions healing well).     Genitourinary: No bleeding (cuff intact, no bleeding noted, Monsel's applied.  BME revealed no defect) in the vagina.                     Assessment:     1. S/P laparoscopic hysterectomy with BSO- robotic assisted        Plan:       Healing well post-op with reassuring exam.  Will have her f/u as scheduled on the 9th

## 2017-05-30 NOTE — TELEPHONE ENCOUNTER
----- Message from Renetta Carrillo sent at 5/30/2017  8:12 AM CDT -----  Contact: Self   Pt is experiencing some blood loss. Please call pt back at [phone]

## 2017-06-07 PROBLEM — M87.051 AVASCULAR NECROSIS OF BONE OF RIGHT HIP: Status: ACTIVE | Noted: 2017-06-07

## 2017-06-12 ENCOUNTER — OFFICE VISIT (OUTPATIENT)
Dept: GYNECOLOGIC ONCOLOGY | Facility: CLINIC | Age: 52
End: 2017-06-12
Attending: OBSTETRICS & GYNECOLOGY
Payer: COMMERCIAL

## 2017-06-12 VITALS
SYSTOLIC BLOOD PRESSURE: 139 MMHG | WEIGHT: 125.88 LBS | HEIGHT: 65 IN | DIASTOLIC BLOOD PRESSURE: 78 MMHG | BODY MASS INDEX: 20.97 KG/M2 | HEART RATE: 101 BPM

## 2017-06-12 DIAGNOSIS — R19.00 PELVIC MASS IN FEMALE: Primary | ICD-10-CM

## 2017-06-12 DIAGNOSIS — Z90.710 S/P LAPAROSCOPIC HYSTERECTOMY: ICD-10-CM

## 2017-06-12 PROCEDURE — 99999 PR PBB SHADOW E&M-EST. PATIENT-LVL II: CPT | Mod: PBBFAC,,, | Performed by: OBSTETRICS & GYNECOLOGY

## 2017-06-12 PROCEDURE — 99024 POSTOP FOLLOW-UP VISIT: CPT | Mod: S$GLB,,, | Performed by: OBSTETRICS & GYNECOLOGY

## 2017-06-12 NOTE — PROGRESS NOTES
Subjective:      Patient ID: Kayla Gray is a 52 y.o. female.    Chief Complaint: Post-op Evaluation      HPI  Here today for post-op check.  Was seen for VB a couple weeks ago in the office.  Final path was a cystadenoma.  Review of Systems     Objective:   Physical Exam:             Abdominal: She exhibits no distension (incisions healing well).     Genitourinary: No bleeding (cuff intact, no bleeding noted, healing well with suture line visible) in the vagina.                     Assessment:     1. Pelvic mass in female    2. S/P laparoscopic hysterectomy with BSO- robotic assisted        Plan:       Healing well.  RTC prn

## 2017-06-26 ENCOUNTER — OFFICE VISIT (OUTPATIENT)
Dept: OBSTETRICS AND GYNECOLOGY | Facility: CLINIC | Age: 52
End: 2017-06-26
Payer: COMMERCIAL

## 2017-06-26 VITALS
BODY MASS INDEX: 23.6 KG/M2 | DIASTOLIC BLOOD PRESSURE: 78 MMHG | WEIGHT: 125 LBS | HEIGHT: 61 IN | SYSTOLIC BLOOD PRESSURE: 120 MMHG | HEART RATE: 78 BPM

## 2017-06-26 DIAGNOSIS — N95.1 MENOPAUSAL SYMPTOMS: Primary | ICD-10-CM

## 2017-06-26 PROCEDURE — 99213 OFFICE O/P EST LOW 20 MIN: CPT | Mod: 24,S$GLB,, | Performed by: OBSTETRICS & GYNECOLOGY

## 2017-06-26 PROCEDURE — 99999 PR PBB SHADOW E&M-EST. PATIENT-LVL II: CPT | Mod: PBBFAC,,, | Performed by: OBSTETRICS & GYNECOLOGY

## 2017-06-27 NOTE — PROGRESS NOTES
Subjective:    Patient ID: Kayla Gray is a 52 y.o. y.o. female    Chief Complaint:   Chief Complaint   Patient presents with    Consult       History of Present Illness:  Kayla presents today for consultation for hot flushes and night sweats. She had a Robotic Hysterectomy with BSO 6 weeks ago and has been having night sweats and flushing. She has not tried anything for control of symptoms.    Review of Systems   Constitutional: Negative for activity change, appetite change, chills, diaphoresis, fatigue, fever and unexpected weight change.   HENT: Negative for mouth sores and tinnitus.    Eyes: Negative for discharge and visual disturbance.   Respiratory: Negative for cough, shortness of breath and wheezing.    Cardiovascular: Negative for chest pain, palpitations and leg swelling.   Gastrointestinal: Negative for abdominal pain, blood in stool, constipation, diarrhea, nausea and vomiting.   Endocrine: Positive for hot flashes. Negative for diabetes, hair loss, hyperthyroidism and hypothyroidism.   Genitourinary: Negative for decreased libido, dyspareunia, dysuria, flank pain, frequency, genital sores, hematuria, menorrhagia, menstrual problem, pelvic pain, urgency, vaginal bleeding, vaginal discharge, vaginal pain, urinary incontinence, postcoital bleeding and vaginal odor.   Musculoskeletal: Negative for back pain, joint swelling and myalgias.   Skin:  Negative for rash, no acne and hair changes.   Neurological: Negative for seizures, syncope, numbness and headaches.   Hematological: Negative for adenopathy. Does not bruise/bleed easily.   Psychiatric/Behavioral: Negative for sleep disturbance. The patient is not nervous/anxious.    Breast: Negative for breast pain and nipple discharge          Objective:    Vital Signs:  Vitals:    06/26/17 0935   BP: 120/78   Pulse: 78       Physical Exam: deferred        Assessment:      1. Menopausal symptoms          Plan:      Menopausal symptoms        A  discussion of the benefit-risk ratio of hormonal replacement therapy was undertaken. Recommended use of  Estrogen replacement included improvement in vasomotor and other climacteric symptoms such as sleep and mood and  reduction of risk for osteoporosis. We discussed the increased risk of thrombo-embolic events such as myocardial infarction, stroke and also the increased risk of  breast cancer with estrogen replacement. We also discussed ACOG's recommendation to use hormone replacement therapy for the relief of hot flashes alone and to be on the lowest dose possible for the shortest amount of time.  Alternative such as herbal and soy-based products were reviewed. All of her questions about this therapy were answered.    She has elected to try OTC Estroven for control of symptoms.    Counseling required 20 minute minutes face to face time to explain to her the various options available.

## 2017-07-17 ENCOUNTER — OFFICE VISIT (OUTPATIENT)
Dept: OBSTETRICS AND GYNECOLOGY | Facility: CLINIC | Age: 52
End: 2017-07-17
Payer: COMMERCIAL

## 2017-07-17 VITALS
HEIGHT: 61 IN | HEART RATE: 76 BPM | DIASTOLIC BLOOD PRESSURE: 80 MMHG | BODY MASS INDEX: 23.22 KG/M2 | WEIGHT: 123 LBS | SYSTOLIC BLOOD PRESSURE: 120 MMHG

## 2017-07-17 DIAGNOSIS — R10.2 PELVIC PAIN IN FEMALE: Primary | ICD-10-CM

## 2017-07-17 DIAGNOSIS — L98.0 PYOGENIC GRANULOMA: ICD-10-CM

## 2017-07-17 DIAGNOSIS — N60.12 FIBROCYSTIC BREAST CHANGES, LEFT: ICD-10-CM

## 2017-07-17 DIAGNOSIS — R30.0 DYSURIA: ICD-10-CM

## 2017-07-17 LAB
BILIRUB SERPL-MCNC: NEGATIVE MG/DL
BLOOD URINE, POC: NEGATIVE
COLOR, POC UA: NORMAL
GLUCOSE UR QL STRIP: NEGATIVE
KETONES UR QL STRIP: NEGATIVE
LEUKOCYTE ESTERASE URINE, POC: 2
NITRITE, POC UA: NEGATIVE
PH, POC UA: 5
PROTEIN, POC: NEGATIVE
SPECIFIC GRAVITY, POC UA: 1.01
UROBILINOGEN, POC UA: NEGATIVE

## 2017-07-17 PROCEDURE — 99214 OFFICE O/P EST MOD 30 MIN: CPT | Mod: 25,S$GLB,, | Performed by: OBSTETRICS & GYNECOLOGY

## 2017-07-17 PROCEDURE — 81001 URINALYSIS AUTO W/SCOPE: CPT | Mod: S$GLB,,, | Performed by: OBSTETRICS & GYNECOLOGY

## 2017-07-17 PROCEDURE — 99999 PR PBB SHADOW E&M-EST. PATIENT-LVL III: CPT | Mod: PBBFAC,,, | Performed by: OBSTETRICS & GYNECOLOGY

## 2017-07-17 RX ORDER — CYCLOBENZAPRINE HCL 10 MG
10 TABLET ORAL 3 TIMES DAILY PRN
Qty: 20 TABLET | Refills: 1 | Status: SHIPPED | OUTPATIENT
Start: 2017-07-17 | End: 2017-07-27

## 2017-07-17 NOTE — PROGRESS NOTES
Subjective:    Patient ID: Kayla Gray is a 52 y.o. y.o. female.     Chief Complaint:   Chief Complaint   Patient presents with    Abdominal Pain    Urinary Frequency    Breast Pain     left       History of Present Illness:  Kayla presents today for evaluation of left sided and pelvic pain which began about 2-3 weeks ago. She describes rthe pain as intermittent with complete resolution between episodes. The character of the pain is described as a pressure. Pain is mostly on her left side. She had a robotic hysterectomy with removal of her ovaries in May. She denies dysuria, hematuria. She has been having problems with constipation and has been on fibercon with good results. She denies painful bowel movements..      Menstrual History:   Patient's last menstrual period was 03/24/2017..     OB History     No data available            Review of Systems   Constitutional: Negative for activity change, appetite change, chills, diaphoresis, fatigue, fever and unexpected weight change.   HENT: Negative for mouth sores and tinnitus.    Eyes: Negative for discharge and visual disturbance.   Respiratory: Negative for cough, shortness of breath and wheezing.    Cardiovascular: Negative for chest pain, palpitations and leg swelling.   Gastrointestinal: Positive for constipation. Negative for abdominal pain, blood in stool, diarrhea, nausea and vomiting.   Endocrine: Negative for diabetes, hair loss, hot flashes, hyperthyroidism and hypothyroidism.   Genitourinary: Positive for pelvic pain. Negative for decreased libido, dyspareunia, dysuria, flank pain, frequency, genital sores, hematuria, menorrhagia, menstrual problem, urgency, vaginal bleeding, vaginal discharge, vaginal pain, urinary incontinence, postcoital bleeding and vaginal odor.   Musculoskeletal: Negative for back pain, joint swelling and myalgias.   Skin:  Negative for rash, no acne and hair changes.   Neurological: Negative for seizures, syncope,  numbness and headaches.   Hematological: Negative for adenopathy. Does not bruise/bleed easily.   Psychiatric/Behavioral: Negative for sleep disturbance. The patient is not nervous/anxious.    Breast: Positive for breast pain.Negative for nipple discharge        Objective:    Vital Signs:  Vitals:    07/17/17 0921   BP: 120/80   Pulse: 76       Physical Exam:  General:  alert,normal appearing gravid female   Skin:  Skin color, texture, turgor normal. No rashes or lesions   HEENT:  conjunctivae/corneas clear. PERRL.   Neck: supple, trachea midline, no adenopathy or thyromegally   Respiratory:  clear to auscultation bilaterally   Heart:  regular rate and rhythm, S1, S2 normal, no murmur, click, rub or gallop   Breasts:   Nipples are protruding and have no nipple discharge. No palpable masses, erythema, skin changes, tenderness, or adenopathy.   Abdomen:  soft, non-tender. Bowel sounds normal. No masses,  no organomegaly Well healed surgical incisions.   Pelvis: External genitalia: normal general appearance  Urinary system: urethral meatus normal, bladder nontender  Vaginal: normal mucosa without prolapse or lesions Pyogenic granulomas at mid cuff. Cauterized with silver nitrate  Cervix: removed surgically  Uterus: removed surgically  Adnexa: normal bimanual exam   Extremities: Normal ROM; no edema, no cyanosis   Neurologial: Normal strength and tone. No focal numbness or weakness. Reflexes 2+ and equal.   Psychiatric: normal mood, speech, dress, and thought processes         Assessment:      1. Pelvic pain in female    2. Dysuria    3. Fibrocystic breast changes, left    4. Pyogenic granuloma          Plan:      Pelvic pain in female  -     CT Abdomen Pelvis W Wo Contrast; Future; Expected date: 07/17/2017  -     cyclobenzaprine (FLEXERIL) 10 MG tablet; Take 1 tablet (10 mg total) by mouth 3 (three) times daily as needed for Muscle spasms.  Dispense: 20 tablet; Refill: 1    Dysuria  -     POCT URINE SEDIMENT EXAM  -      POCT urinalysis, dipstick or tablet reag    Fibrocystic breast changes, left  -     US Breast Left Complete; Future; Expected date: 07/17/2017    Pyogenic granuloma

## 2017-07-20 ENCOUNTER — HOSPITAL ENCOUNTER (OUTPATIENT)
Dept: RADIOLOGY | Facility: HOSPITAL | Age: 52
Discharge: HOME | End: 2017-07-20
Attending: OBSTETRICS & GYNECOLOGY
Payer: COMMERCIAL

## 2017-07-20 VITALS — WEIGHT: 123 LBS | BODY MASS INDEX: 24.15 KG/M2 | HEIGHT: 60 IN

## 2017-07-20 DIAGNOSIS — N60.12 FIBROCYSTIC BREAST CHANGES, LEFT: ICD-10-CM

## 2017-07-20 DIAGNOSIS — R10.2 PELVIC PAIN IN FEMALE: ICD-10-CM

## 2017-07-20 PROCEDURE — 76641 ULTRASOUND BREAST COMPLETE: CPT | Mod: TC,LT

## 2017-07-20 PROCEDURE — 74178 CT ABD&PLV WO CNTR FLWD CNTR: CPT | Mod: TC

## 2017-07-20 PROCEDURE — 76641 ULTRASOUND BREAST COMPLETE: CPT | Mod: 26,LT,, | Performed by: RADIOLOGY

## 2017-07-20 PROCEDURE — 25500020 PHARM REV CODE 255: Performed by: OBSTETRICS & GYNECOLOGY

## 2017-07-20 PROCEDURE — 74178 CT ABD&PLV WO CNTR FLWD CNTR: CPT | Mod: 26,,, | Performed by: RADIOLOGY

## 2017-07-20 RX ADMIN — IOHEXOL 30 ML: 350 INJECTION, SOLUTION INTRAVENOUS at 09:07

## 2017-07-20 RX ADMIN — IOHEXOL 75 ML: 350 INJECTION, SOLUTION INTRAVENOUS at 09:07

## 2017-08-07 ENCOUNTER — OFFICE VISIT (OUTPATIENT)
Dept: OBSTETRICS AND GYNECOLOGY | Facility: CLINIC | Age: 52
End: 2017-08-07
Payer: COMMERCIAL

## 2017-08-07 VITALS
HEIGHT: 61 IN | HEART RATE: 78 BPM | SYSTOLIC BLOOD PRESSURE: 120 MMHG | BODY MASS INDEX: 23.22 KG/M2 | WEIGHT: 123 LBS | DIASTOLIC BLOOD PRESSURE: 78 MMHG | RESPIRATION RATE: 16 BRPM

## 2017-08-07 DIAGNOSIS — N95.2 ATROPHIC VAGINITIS: Primary | ICD-10-CM

## 2017-08-07 PROCEDURE — 99999 PR PBB SHADOW E&M-EST. PATIENT-LVL III: CPT | Mod: PBBFAC,,, | Performed by: OBSTETRICS & GYNECOLOGY

## 2017-08-07 PROCEDURE — 3008F BODY MASS INDEX DOCD: CPT | Mod: S$GLB,,, | Performed by: OBSTETRICS & GYNECOLOGY

## 2017-08-07 PROCEDURE — 99213 OFFICE O/P EST LOW 20 MIN: CPT | Mod: 24,S$GLB,, | Performed by: OBSTETRICS & GYNECOLOGY

## 2017-08-07 RX ORDER — MONTELUKAST SODIUM 10 MG/1
10 TABLET ORAL NIGHTLY
COMMUNITY
End: 2017-10-30

## 2017-08-07 RX ORDER — CALCIUM CARBONATE 600 MG
600 TABLET ORAL 2 TIMES DAILY WITH MEALS
COMMUNITY
End: 2021-02-04

## 2017-08-07 RX ORDER — VITAMIN E 268 MG
400 CAPSULE ORAL DAILY
COMMUNITY
End: 2018-08-06

## 2017-08-07 RX ORDER — ESTRADIOL 10 UG/1
1 INSERT VAGINAL
Qty: 8 TABLET | Refills: 11 | Status: SHIPPED | OUTPATIENT
Start: 2017-08-07 | End: 2021-02-04

## 2017-08-07 NOTE — PROGRESS NOTES
Subjective:    Patient ID: Kayla Gray is a 52 y.o. y.o. female    Chief Complaint:   Chief Complaint   Patient presents with    Follow-up     polyps        History of Present Illness:  Kayla presents today for follow-up of a pyogenic granuloma following TLH. She is feeling better following cautery. She denies pelvic pain, bleeding, vaginal discharge. She has been having increasing joint stiffness in the   AM.      Review of Systems   Constitutional: Negative for activity change, appetite change, chills, diaphoresis, fatigue, fever and unexpected weight change.   HENT: Negative for mouth sores and tinnitus.    Eyes: Negative for discharge and visual disturbance.   Respiratory: Negative for cough, shortness of breath and wheezing.    Cardiovascular: Negative for chest pain, palpitations and leg swelling.   Gastrointestinal: Negative for abdominal pain, blood in stool, constipation, diarrhea, nausea and vomiting.   Endocrine: Negative for diabetes, hair loss, hot flashes, hyperthyroidism and hypothyroidism.   Genitourinary: Positive for dyspareunia. Negative for decreased libido, dysuria, flank pain, frequency, genital sores, hematuria, menorrhagia, menstrual problem, pelvic pain, urgency, vaginal bleeding, vaginal discharge, vaginal pain, urinary incontinence, postcoital bleeding and vaginal odor.   Musculoskeletal: Positive for joint swelling. Negative for back pain and myalgias.   Skin:  Negative for rash, no acne and hair changes.   Neurological: Negative for seizures, syncope, numbness and headaches.   Hematological: Negative for adenopathy. Does not bruise/bleed easily.   Psychiatric/Behavioral: Negative for sleep disturbance. The patient is not nervous/anxious.    Breast: Negative for breast pain and nipple discharge          Objective:    Vital Signs:  Vitals:    08/07/17 0835   BP: 120/78   Pulse: 78   Resp: 16       Physical Exam:  General:  alert,normal appearing gravid female   Skin:  Skin  color, texture, turgor normal. No rashes or lesions   Abdomen: soft, non-tender. Bowel sounds normal. No masses,  no organomegaly   Pelvis: External genitalia: normal general appearance  Urinary system: urethral meatus normal, bladder nontender  Vaginal: normal mucosa without prolapse or lesions, atrophic mucosa  Cervix: removed surgically  Uterus: removed surgically  Adnexa: removed surgically         Assessment:      1. Atrophic vaginitis          Plan:      Atrophic vaginitis  -     estradiol 10 mcg Tab; Place 1 tablet (10 mcg total) vaginally twice a week.  Dispense: 8 tablet; Refill: 11

## 2017-10-03 ENCOUNTER — TELEPHONE (OUTPATIENT)
Dept: OBSTETRICS AND GYNECOLOGY | Facility: CLINIC | Age: 52
End: 2017-10-03

## 2017-10-03 NOTE — TELEPHONE ENCOUNTER
Pharmacist Chritsen at [facility] requesting 90 day supply of remainder of refills for Yuvafem due to insurance coverage. Approved.

## 2017-10-09 ENCOUNTER — TELEPHONE (OUTPATIENT)
Dept: OBSTETRICS AND GYNECOLOGY | Facility: CLINIC | Age: 52
End: 2017-10-09

## 2017-10-09 NOTE — TELEPHONE ENCOUNTER
Patient notified I have spoken with pharmacist Christen who states 90 day supply of Yuvafem is ready for . Patient verbalized understanding with no questions or concerns.

## 2017-10-09 NOTE — TELEPHONE ENCOUNTER
----- Message from Betsy Bridges sent at 10/9/2017  1:40 PM CDT -----  Contact: Patient  Kayla Gray  MRN: [id number]  Home Phone      [phone]  Work Phone      Not on file.  Mobile          [phone]    Patient Care Team:  Shell Mckee NP as PCP - General (Family Medicine)  Ashvin Chakraborty MD as Obstetrician (Obstetrics)  OB? No  What phone number can you be reached at? [phone]  Message: Following up on 90 day prescription for Yuvafem (insurance won't pay for 30 day) - states that she spoke to someone last week saying it was approved, but prescription hasn't been sent. [facility]

## 2017-10-30 ENCOUNTER — OFFICE VISIT (OUTPATIENT)
Dept: OBSTETRICS AND GYNECOLOGY | Facility: CLINIC | Age: 52
End: 2017-10-30
Payer: COMMERCIAL

## 2017-10-30 VITALS
BODY MASS INDEX: 23.98 KG/M2 | WEIGHT: 127 LBS | SYSTOLIC BLOOD PRESSURE: 130 MMHG | RESPIRATION RATE: 16 BRPM | HEIGHT: 61 IN | HEART RATE: 76 BPM | DIASTOLIC BLOOD PRESSURE: 82 MMHG

## 2017-10-30 DIAGNOSIS — N30.90 CYSTITIS: ICD-10-CM

## 2017-10-30 DIAGNOSIS — R10.2 PELVIC PAIN IN FEMALE: Primary | ICD-10-CM

## 2017-10-30 LAB
BACTERIA SPEC CULT: NORMAL
BILIRUB SERPL-MCNC: NEGATIVE MG/DL
BLOOD URINE, POC: NEGATIVE
CASTS: 0
COLOR, POC UA: YELLOW
CRYSTALS: 0
GLUCOSE UR QL STRIP: NORMAL
KETONES UR QL STRIP: NEGATIVE
LEUKOCYTE ESTERASE URINE, POC: 1
NITRITE, POC UA: POSITIVE
PH, POC UA: 6
PROTEIN, POC: NEGATIVE
RBC CELLS COUNTED: NORMAL
SPECIFIC GRAVITY, POC UA: 1.01
UROBILINOGEN, POC UA: NORMAL
WHITE BLOOD CELLS: NORMAL

## 2017-10-30 PROCEDURE — 99999 PR PBB SHADOW E&M-EST. PATIENT-LVL III: CPT | Mod: PBBFAC,,, | Performed by: OBSTETRICS & GYNECOLOGY

## 2017-10-30 PROCEDURE — 99214 OFFICE O/P EST MOD 30 MIN: CPT | Mod: 25,S$GLB,, | Performed by: OBSTETRICS & GYNECOLOGY

## 2017-10-30 PROCEDURE — 81001 URINALYSIS AUTO W/SCOPE: CPT | Mod: S$GLB,,, | Performed by: OBSTETRICS & GYNECOLOGY

## 2017-10-30 RX ORDER — SULFAMETHOXAZOLE AND TRIMETHOPRIM 800; 160 MG/1; MG/1
1 TABLET ORAL 2 TIMES DAILY
Qty: 20 TABLET | Refills: 0 | Status: SHIPPED | OUTPATIENT
Start: 2017-10-30 | End: 2017-11-09

## 2017-10-30 NOTE — PROGRESS NOTES
Subjective:    Patient ID: Kayla Gray is a 52 y.o. y.o. female.     Chief Complaint:   Chief Complaint   Patient presents with    Pelvic Discomfort       History of Present Illness:  Kayla presents today for evaluation of pelvic pain. She states she feels a constant pressure in her pelvis but has exacerbations form time to time. She has noted pain with urination, but not dysuria. She denies hematuria but has some frequency and nocturia   X 1. She has also noted painful bowel movements at times but denies diarrhea and constipation. She has had no mucus or blood in the stool. She had pyogenic granulomas of the vaginal cuff following hysterectomy and states she feels just like she did when these were causing pain. She denies vaginal \discharge or bleeding.      Menstrual History:   Patient's last menstrual period was 03/24/2017..     OB History     No data available            Review of Systems   Constitutional: Negative for activity change, appetite change, chills, diaphoresis, fatigue, fever and unexpected weight change.   HENT: Negative for mouth sores and tinnitus.    Eyes: Negative for discharge and visual disturbance.   Respiratory: Negative for cough, shortness of breath and wheezing.    Cardiovascular: Negative for chest pain, palpitations and leg swelling.   Gastrointestinal: Negative for abdominal pain, blood in stool, constipation, diarrhea, nausea and vomiting.   Endocrine: Negative for diabetes, hair loss, hot flashes, hyperthyroidism and hypothyroidism.   Genitourinary: Positive for dyspareunia, frequency and pelvic pain. Negative for decreased libido, dysuria, flank pain, genital sores, hematuria, menorrhagia, menstrual problem, urgency, vaginal bleeding, vaginal discharge, vaginal pain, urinary incontinence, postcoital bleeding and vaginal odor.   Musculoskeletal: Positive for joint swelling (right hip with avasular necrosis). Negative for back pain and myalgias.   Skin:  Negative for  rash, no acne and hair changes.   Neurological: Negative for seizures, syncope, numbness and headaches.   Hematological: Negative for adenopathy. Does not bruise/bleed easily.   Psychiatric/Behavioral: Negative for sleep disturbance. The patient is not nervous/anxious.    Breast: Negative for breast pain and nipple discharge        Objective:    Vital Signs:  Vitals:    10/30/17 1021   BP: 130/82   Pulse: 76   Resp: 16       Physical Exam:  General:  alert,normal appearing gravid female   Skin:  Skin color, texture, turgor normal. No rashes or lesions   HEENT:  conjunctivae/corneas clear. PERRL.   Neck: supple, trachea midline, no adenopathy or thyromegally   Respiratory:  clear to auscultation bilaterally   Heart:  regular rate and rhythm, S1, S2 normal, no murmur, click, rub or gallop   Abdomen:  soft, non-tender. Bowel sounds normal. No masses,  no organomegalyComplains of mild pressure on deep palpation in lower abdomen.   Pelvis: External genitalia: normal general appearance  Urinary system: urethral meatus normal, bladder nontender  Vaginal: normal mucosa without prolapse or lesions, cuff well healed. No granulomas. Tender cuff, particularly posterior fornix. No masses.  Cervix: removed surgically  Uterus: removed surgically  Adnexa: removed surgically   Extremities: Normal ROM; no edema, no cyanosis   Neurologial: Normal strength and tone. No focal numbness or weakness. Reflexes 2+ and equal.   Psychiatric: normal mood, speech, dress, and thought processes         Assessment:      1. Pelvic pain in female          Plan:      Pelvic pain in female  -     POCT urinalysis, dipstick or tablet reag  -     POCT URINE SEDIMENT EXAM  -     US OB/GYN Procedure ([facility]); Future; Expected date: 10/30/2017

## 2017-11-10 ENCOUNTER — TELEPHONE (OUTPATIENT)
Dept: OBSTETRICS AND GYNECOLOGY | Facility: CLINIC | Age: 52
End: 2017-11-10

## 2017-11-10 NOTE — TELEPHONE ENCOUNTER
----- Message from Johanna Adorno sent at 11/10/2017  7:24 AM CST -----  Contact: self  Kayla Gray  MRN: [id number]  Home Phone      [phone]  Work Phone      Not on file.  Mobile          [phone]    Patient Care Team:  Shell Mckee NP as PCP - General (Family Medicine)  Ashvin Chakraborty MD as Obstetrician (Obstetrics)  OB? No  What phone number can you be reached at? [phone]  Message: finished the antibiotic and would like to know what she can take for yeast infection

## 2017-11-10 NOTE — TELEPHONE ENCOUNTER
Pt c/o yeast infection caused by recent antibiotic treatment. Pt instructed to try 3-7 day monistat OTC. If symptoms do not improve, contact office. Verbalized understanding.

## 2017-11-24 ENCOUNTER — OFFICE VISIT (OUTPATIENT)
Dept: OBSTETRICS AND GYNECOLOGY | Facility: CLINIC | Age: 52
End: 2017-11-24
Payer: COMMERCIAL

## 2017-11-24 VITALS
HEART RATE: 76 BPM | SYSTOLIC BLOOD PRESSURE: 110 MMHG | DIASTOLIC BLOOD PRESSURE: 72 MMHG | HEIGHT: 61 IN | BODY MASS INDEX: 23.79 KG/M2 | RESPIRATION RATE: 16 BRPM | WEIGHT: 126 LBS

## 2017-11-24 DIAGNOSIS — R68.82 DECREASED LIBIDO: Primary | ICD-10-CM

## 2017-11-24 DIAGNOSIS — R39.9 UTI SYMPTOMS: ICD-10-CM

## 2017-11-24 DIAGNOSIS — E34.9 TESTOSTERONE DEFICIENCY: ICD-10-CM

## 2017-11-24 LAB
BACTERIA SPEC CULT: NORMAL
BILIRUB SERPL-MCNC: NEGATIVE MG/DL
BLOOD URINE, POC: NEGATIVE
CASTS: 0
COLOR, POC UA: YELLOW
CRYSTALS: 0
GLUCOSE UR QL STRIP: NORMAL
KETONES UR QL STRIP: NEGATIVE
LEUKOCYTE ESTERASE URINE, POC: NORMAL
NITRITE, POC UA: NEGATIVE
PH, POC UA: 7
PROTEIN, POC: NEGATIVE
RBC CELLS COUNTED: 0
SPECIFIC GRAVITY, POC UA: 1.01
UROBILINOGEN, POC UA: NORMAL
WHITE BLOOD CELLS: NORMAL

## 2017-11-24 PROCEDURE — 99213 OFFICE O/P EST LOW 20 MIN: CPT | Mod: S$GLB,,, | Performed by: OBSTETRICS & GYNECOLOGY

## 2017-11-24 PROCEDURE — 99999 PR PBB SHADOW E&M-EST. PATIENT-LVL III: CPT | Mod: PBBFAC,,, | Performed by: OBSTETRICS & GYNECOLOGY

## 2017-11-24 PROCEDURE — 81000 URINALYSIS NONAUTO W/SCOPE: CPT | Mod: S$GLB,,, | Performed by: OBSTETRICS & GYNECOLOGY

## 2017-11-24 PROCEDURE — 81002 URINALYSIS NONAUTO W/O SCOPE: CPT | Mod: S$GLB,,, | Performed by: OBSTETRICS & GYNECOLOGY

## 2017-11-24 NOTE — PROGRESS NOTES
Subjective:    Patient ID: Kayla Gray is a 52 y.o. y.o. 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 She had multiple procedures of silver nitrate cautery and states she has had complete relief of her pain. Since her MAINE/BSO she has noted a decrease in libido. She has been using vaginal estrogen for atrophic vaginitis, but states she has no sexual drive which is different from before her surgery.      Review of Systems   Constitutional: Negative for activity change, appetite change, chills, diaphoresis, fatigue, fever and unexpected weight change.   HENT: Negative for mouth sores and tinnitus.    Eyes: Negative for discharge and visual disturbance.   Respiratory: Negative for cough, shortness of breath and wheezing.    Cardiovascular: Negative for chest pain, palpitations and leg swelling.   Gastrointestinal: Negative for abdominal pain, blood in stool, constipation, diarrhea, nausea and vomiting.   Endocrine: Negative for diabetes, hair loss, hot flashes, hyperthyroidism and hypothyroidism.   Genitourinary: Positive for decreased libido. Negative for dyspareunia, dysuria, flank pain, frequency, genital sores, hematuria, menorrhagia, menstrual problem, pelvic pain, urgency, vaginal bleeding, vaginal discharge, vaginal pain, urinary incontinence, postcoital bleeding and vaginal odor.   Musculoskeletal: Negative for back pain, joint swelling and myalgias.   Skin:  Negative for rash, no acne and hair changes.   Neurological: Negative for seizures, syncope, numbness and headaches.   Hematological: Negative for adenopathy. Does not bruise/bleed easily.   Psychiatric/Behavioral: Negative for sleep disturbance. The patient is not nervous/anxious.    Breast: Negative for breast pain and nipple discharge          Objective:    Vital  Signs:  Vitals:    11/24/17 1212   BP: 110/72   Pulse: 76   Resp: 16       Physical Exam:  General:  alert,normal appearing gravid female   Skin:  Skin color, texture, turgor normal. No rashes or lesions   Abdomen: soft, non-tender. Bowel sounds normal. No masses,  no organomegaly   Pelvis: deferred         Assessment:      1. Decreased libido    2. Testosterone deficiency          Plan:      Decreased libido    Testosterone deficiency  -     Testosterone; Future; Expected date: 11/24/2017    Other orders  -     nutraderm Lotn 240 mL; Attention Pharmacist:    Mix the following per gram of compound in moisturizing cream:      Testosterone Powder: 2 %    Sig: Apply 1 gm to inner thigh or upper inner arm 2-3X weekly     Disp: 30 gm   Refill: 11  Dispense: 30 g; Refill: 11        Counseling required 15 minute minutes face to face time to explain to her the various options available.'      A discussion of the benefit-risk ratio of hormonal replacement therapy was undertaken. Recommended use of  Estrogen replacement included improvement in vasomotor and other climacteric symptoms such as sleep and mood and  reduction of risk for osteoporosis. We discussed the increased risk of thrombo-embolic events such as myocardial infarction, stroke and also the increased risk of  breast cancer with estrogen replacement. We also discussed ACOG's recommendation to use hormone replacement therapy for the relief of hot flashes alone and to be on the lowest dose possible for the shortest amount of time.  Alternative such as herbal and soy-based products were reviewed. All of her questions about this therapy were answered.

## 2017-12-19 ENCOUNTER — LAB VISIT (OUTPATIENT)
Dept: LAB | Facility: HOSPITAL | Age: 52
End: 2017-12-19
Attending: OBSTETRICS & GYNECOLOGY
Payer: COMMERCIAL

## 2017-12-19 DIAGNOSIS — E34.9 TESTOSTERONE DEFICIENCY: ICD-10-CM

## 2017-12-19 LAB — TESTOST SERPL-MCNC: 11 NG/DL

## 2017-12-19 PROCEDURE — 84403 ASSAY OF TOTAL TESTOSTERONE: CPT

## 2017-12-19 PROCEDURE — 36415 COLL VENOUS BLD VENIPUNCTURE: CPT

## 2018-03-06 PROBLEM — T78.40XA ALLERGY: Status: ACTIVE | Noted: 2018-03-06

## 2018-08-01 ENCOUNTER — TELEPHONE (OUTPATIENT)
Dept: OBSTETRICS AND GYNECOLOGY | Facility: CLINIC | Age: 53
End: 2018-08-01

## 2018-08-01 NOTE — TELEPHONE ENCOUNTER
Patient states she has been experiencing severe 10/10 back pain and bladder pressure throughout the day. States she feels she may need to report to the ER. Informed patient if she feels she can not wait to be seen by a physician here she should repot to the ER.  Denies urinary urgency, frequency, burning upon urination, fever, nausea, vomiting, or any other symptoms. Patient states she will report to ER for evaluation of pain, however requested an appointment with Dr. Chakraborty on Monday for evaluation. Appointment scheduled per request.

## 2018-08-01 NOTE — TELEPHONE ENCOUNTER
----- Message from Zee Kirkpatrick sent at 8/1/2018  3:41 PM CDT -----  Contact: Tima/spouse  Kayla Gray  MRN: [id number]  Home Phone      [phone]  Work Phone      Not on file.  Mobile          [phone]    Patient Care Team:  Shell Mckee NP as PCP - General (Family Medicine)  Ashvin Chakraborty MD as Obstetrician (Obstetrics)  OB? No  What phone number can you be reached at? [phone]  Message: the patient's spouse states the patient is having extreme pain in her abdomen. He thinks he may have to bring the patient in to the ER depending when the patient can come in to be seen.

## 2018-08-06 ENCOUNTER — OFFICE VISIT (OUTPATIENT)
Dept: OBSTETRICS AND GYNECOLOGY | Facility: CLINIC | Age: 53
End: 2018-08-06
Payer: COMMERCIAL

## 2018-08-06 VITALS
SYSTOLIC BLOOD PRESSURE: 150 MMHG | HEART RATE: 86 BPM | WEIGHT: 127.63 LBS | BODY MASS INDEX: 24.1 KG/M2 | HEIGHT: 61 IN | DIASTOLIC BLOOD PRESSURE: 90 MMHG | RESPIRATION RATE: 18 BRPM

## 2018-08-06 DIAGNOSIS — R10.9 LEFT FLANK PAIN: Primary | ICD-10-CM

## 2018-08-06 LAB
BACTERIA SPEC CULT: NORMAL
BILIRUB SERPL-MCNC: NEGATIVE MG/DL
BLOOD URINE, POC: 250
CASTS: 0
COLOR, POC UA: NORMAL
CRYSTALS: 0
GLUCOSE UR QL STRIP: NEGATIVE
KETONES UR QL STRIP: NEGATIVE
LEUKOCYTE ESTERASE URINE, POC: NEGATIVE
NITRITE, POC UA: NEGATIVE
PH, POC UA: 5
PROTEIN, POC: NEGATIVE
RBC CELLS COUNTED: NORMAL
SPECIFIC GRAVITY, POC UA: 1.01
UROBILINOGEN, POC UA: NEGATIVE
WHITE BLOOD CELLS: NORMAL

## 2018-08-06 PROCEDURE — 3008F BODY MASS INDEX DOCD: CPT | Mod: CPTII,S$GLB,, | Performed by: OBSTETRICS & GYNECOLOGY

## 2018-08-06 PROCEDURE — 99999 PR PBB SHADOW E&M-EST. PATIENT-LVL III: CPT | Mod: PBBFAC,,, | Performed by: OBSTETRICS & GYNECOLOGY

## 2018-08-06 PROCEDURE — 81000 URINALYSIS NONAUTO W/SCOPE: CPT | Mod: S$GLB,,, | Performed by: OBSTETRICS & GYNECOLOGY

## 2018-08-06 PROCEDURE — 99213 OFFICE O/P EST LOW 20 MIN: CPT | Mod: 25,S$GLB,, | Performed by: OBSTETRICS & GYNECOLOGY

## 2018-08-06 RX ORDER — HYDROCODONE BITARTRATE AND ACETAMINOPHEN 5; 325 MG/1; MG/1
1 TABLET ORAL EVERY 6 HOURS PRN
COMMUNITY
End: 2021-02-04

## 2018-08-06 RX ORDER — CYCLOBENZAPRINE HCL 10 MG
10 TABLET ORAL 3 TIMES DAILY PRN
COMMUNITY
End: 2021-02-04

## 2018-08-06 NOTE — PROGRESS NOTES
Subjective:    Patient ID: Kayla Gray is a 53 y.o. y.o. female    Chief Complaint:   Chief Complaint   Patient presents with    Pelvic Pain     left side pelvic pain x 5 days.    Urinary Pressure     x 5 days       History of Present Illness:  Kayla presents today for evaluation of left flank pain which began about 4 days ago. Pain has been colicky and intermittent. She denies dysuria, frequency, hematuria, but admits to pressure in the suprapubic area. She denies fever, chills, N/V. She had a CT scan done last year which revealed a non-obstructing left kidney stone.      Review of Systems   Constitutional: Negative for activity change, appetite change, chills, diaphoresis, fatigue, fever and unexpected weight change.   HENT: Negative for mouth sores and tinnitus.    Eyes: Negative for discharge and visual disturbance.   Respiratory: Negative for cough, shortness of breath and wheezing.    Cardiovascular: Negative for chest pain, palpitations and leg swelling.   Gastrointestinal: Positive for abdominal pain. Negative for blood in stool, constipation, diarrhea, nausea and vomiting.   Endocrine: Negative for diabetes, hair loss, hot flashes, hyperthyroidism and hypothyroidism.   Genitourinary: Positive for flank pain. Negative for decreased libido, dyspareunia, dysuria, frequency, genital sores, hematuria, menorrhagia, menstrual problem, pelvic pain, urgency, vaginal bleeding, vaginal discharge, vaginal pain, urinary incontinence, postcoital bleeding and vaginal odor.   Musculoskeletal: Negative for back pain, joint swelling and myalgias.   Skin:  Negative for rash, no acne and hair changes.   Neurological: Negative for seizures, syncope, numbness and headaches.   Hematological: Negative for adenopathy. Does not bruise/bleed easily.   Psychiatric/Behavioral: Negative for sleep disturbance. The patient is not nervous/anxious.    Breast: Negative for breast pain and nipple  discharge          Objective:    Vital Signs:  Vitals:    08/06/18 0841   BP: (!) 150/90   Pulse: 86   Resp: 18       Physical Exam:  General:  alert,normal appearing gravid female   Skin:  Skin color, texture, turgor normal. No rashes or lesions   Abdomen: soft, non-tender. Bowel sounds normal. No masses,  no organomegaly. Minimal left CVA tenderness.   Pelvis: External genitalia: normal general appearance  Urinary system: urethral meatus normal, bladder nontender  Vaginal: normal mucosa without prolapse or lesions  Cervix: normal appearance  Uterus: removed surgically  Adnexa: normal bimanual exam         Assessment:      1. Left flank pain          Plan:      Left flank pain  -     POCT URINE SEDIMENT EXAM  -     POCT urinalysis, dipstick or tablet reag        I feel that this is probably pain from her left kidney stone. She has a trace of blood on urinalysis. I have advise her to drink plenty of fluids and watch her urine for stones. If pain is getting worse I will refer to urology.

## 2018-12-30 DIAGNOSIS — N95.2 ATROPHIC VAGINITIS: ICD-10-CM

## 2018-12-31 RX ORDER — ESTRADIOL 10 UG/1
TABLET VAGINAL
Qty: 24 TABLET | Refills: 4 | OUTPATIENT
Start: 2018-12-31

## 2020-07-13 ENCOUNTER — OFFICE VISIT (OUTPATIENT)
Dept: INTERNAL MEDICINE | Facility: CLINIC | Age: 55
End: 2020-07-13
Payer: COMMERCIAL

## 2020-07-13 VITALS
BODY MASS INDEX: 24.97 KG/M2 | DIASTOLIC BLOOD PRESSURE: 78 MMHG | SYSTOLIC BLOOD PRESSURE: 122 MMHG | WEIGHT: 132.25 LBS | HEART RATE: 89 BPM | RESPIRATION RATE: 20 BRPM | HEIGHT: 61 IN | OXYGEN SATURATION: 97 % | TEMPERATURE: 98 F

## 2020-07-13 DIAGNOSIS — R31.9 URINARY TRACT INFECTION WITH HEMATURIA, SITE UNSPECIFIED: Primary | ICD-10-CM

## 2020-07-13 DIAGNOSIS — N39.0 URINARY TRACT INFECTION WITH HEMATURIA, SITE UNSPECIFIED: Primary | ICD-10-CM

## 2020-07-13 LAB
BILIRUB SERPL-MCNC: ABNORMAL MG/DL
BLOOD URINE, POC: 20
CLARITY, POC UA: ABNORMAL
COLOR, POC UA: ABNORMAL
GLUCOSE UR QL STRIP: NORMAL
KETONES UR QL STRIP: ABNORMAL
LEUKOCYTE ESTERASE URINE, POC: ABNORMAL
NITRITE, POC UA: ABNORMAL
PH, POC UA: 6
PROTEIN, POC: ABNORMAL
SPECIFIC GRAVITY, POC UA: 1.01
UROBILINOGEN, POC UA: NORMAL

## 2020-07-13 PROCEDURE — 99999 PR PBB SHADOW E&M-EST. PATIENT-LVL IV: CPT | Mod: PBBFAC,,, | Performed by: NURSE PRACTITIONER

## 2020-07-13 PROCEDURE — 87088 URINE BACTERIA CULTURE: CPT

## 2020-07-13 PROCEDURE — 3008F PR BODY MASS INDEX (BMI) DOCUMENTED: ICD-10-PCS | Mod: CPTII,S$GLB,, | Performed by: NURSE PRACTITIONER

## 2020-07-13 PROCEDURE — 3008F BODY MASS INDEX DOCD: CPT | Mod: CPTII,S$GLB,, | Performed by: NURSE PRACTITIONER

## 2020-07-13 PROCEDURE — 99202 PR OFFICE/OUTPT VISIT, NEW, LEVL II, 15-29 MIN: ICD-10-PCS | Mod: 25,S$GLB,, | Performed by: NURSE PRACTITIONER

## 2020-07-13 PROCEDURE — 87086 URINE CULTURE/COLONY COUNT: CPT

## 2020-07-13 PROCEDURE — 81002 URINALYSIS NONAUTO W/O SCOPE: CPT | Mod: S$GLB,,, | Performed by: NURSE PRACTITIONER

## 2020-07-13 PROCEDURE — 87186 SC STD MICRODIL/AGAR DIL: CPT | Mod: 59

## 2020-07-13 PROCEDURE — 87077 CULTURE AEROBIC IDENTIFY: CPT | Mod: 59

## 2020-07-13 PROCEDURE — 81002 POCT URINE DIPSTICK WITHOUT MICROSCOPE: ICD-10-PCS | Mod: S$GLB,,, | Performed by: NURSE PRACTITIONER

## 2020-07-13 PROCEDURE — 99202 OFFICE O/P NEW SF 15 MIN: CPT | Mod: 25,S$GLB,, | Performed by: NURSE PRACTITIONER

## 2020-07-13 PROCEDURE — 99999 PR PBB SHADOW E&M-EST. PATIENT-LVL IV: ICD-10-PCS | Mod: PBBFAC,,, | Performed by: NURSE PRACTITIONER

## 2020-07-13 RX ORDER — NITROFURANTOIN (MACROCRYSTALS) 100 MG/1
100 CAPSULE ORAL EVERY 12 HOURS
Qty: 14 CAPSULE | Refills: 0 | Status: SHIPPED | OUTPATIENT
Start: 2020-07-13 | End: 2020-07-20

## 2020-07-13 RX ORDER — BUDESONIDE AND FORMOTEROL FUMARATE DIHYDRATE 80; 4.5 UG/1; UG/1
2 AEROSOL RESPIRATORY (INHALATION) 2 TIMES DAILY
Qty: 10.2 G | Refills: 0 | Status: SHIPPED | OUTPATIENT
Start: 2020-07-13 | End: 2020-08-04

## 2020-07-13 NOTE — PROGRESS NOTES
"Subjective:       Patient ID: Kayla Gray is a 55 y.o. female.    Chief Complaint: Urinary Tract Infection (burning - x 1 wk)    Patient is known, to me and presents with   Chief Complaint   Patient presents with    Urinary Tract Infection     burning - x 1 wk   .  Denies chest pain and shortness of breath.  Patient presents with uti s/s for past couple of days. No back pain   HPI  Review of Systems   Constitutional: Negative.    Respiratory: Negative.    Cardiovascular: Negative.    Genitourinary: Positive for dysuria, frequency and urgency.   Musculoskeletal: Negative.    Skin: Negative.        Objective:      Physical Exam  Constitutional:       Appearance: Normal appearance.   Neck:      Musculoskeletal: Normal range of motion.   Cardiovascular:      Rate and Rhythm: Normal rate and regular rhythm.      Heart sounds: Normal heart sounds.   Pulmonary:      Effort: Pulmonary effort is normal.      Breath sounds: Normal breath sounds. No wheezing.   Genitourinary:     Comments: See dip  Neurological:      Mental Status: She is alert.         Assessment:       1. Urinary tract infection with hematuria, site unspecified        Plan:   Kayla was seen today for urinary tract infection.    Diagnoses and all orders for this visit:    Urinary tract infection with hematuria, site unspecified  -     POCT URINE DIPSTICK WITHOUT MICROSCOPE  -     Urine culture; Future  -     budesonide-formoterol 80-4.5 mcg (SYMBICORT) 80-4.5 mcg/actuation HFAA; Inhale 2 puffs into the lungs 2 (two) times a day. Controller  -     nitrofurantoin (MACRODANTIN) 100 MG capsule; Take 1 capsule (100 mg total) by mouth every 12 (twelve) hours. for 7 days  -     Urine culture    "This note will not be shared with the patient."  Keep hydrated  rtc as scheduled    "

## 2020-07-13 NOTE — PATIENT INSTRUCTIONS

## 2020-07-17 LAB
BACTERIA UR CULT: ABNORMAL
BACTERIA UR CULT: ABNORMAL

## 2020-08-04 DIAGNOSIS — R31.9 URINARY TRACT INFECTION WITH HEMATURIA, SITE UNSPECIFIED: ICD-10-CM

## 2020-08-04 DIAGNOSIS — N39.0 URINARY TRACT INFECTION WITH HEMATURIA, SITE UNSPECIFIED: ICD-10-CM

## 2020-08-04 RX ORDER — BUDESONIDE AND FORMOTEROL FUMARATE DIHYDRATE 80; 4.5 UG/1; UG/1
AEROSOL RESPIRATORY (INHALATION)
Qty: 10.2 INHALER | Refills: 0 | Status: SHIPPED | OUTPATIENT
Start: 2020-08-04 | End: 2020-08-27

## 2020-08-27 DIAGNOSIS — N39.0 URINARY TRACT INFECTION WITH HEMATURIA, SITE UNSPECIFIED: ICD-10-CM

## 2020-08-27 DIAGNOSIS — R31.9 URINARY TRACT INFECTION WITH HEMATURIA, SITE UNSPECIFIED: ICD-10-CM

## 2020-08-27 RX ORDER — BUDESONIDE AND FORMOTEROL FUMARATE DIHYDRATE 80; 4.5 UG/1; UG/1
AEROSOL RESPIRATORY (INHALATION)
Qty: 10.2 INHALER | Refills: 5 | Status: SHIPPED | OUTPATIENT
Start: 2020-08-27 | End: 2022-10-20

## 2020-12-29 ENCOUNTER — OFFICE VISIT (OUTPATIENT)
Dept: FAMILY MEDICINE | Facility: CLINIC | Age: 55
End: 2020-12-29
Payer: COMMERCIAL

## 2020-12-29 ENCOUNTER — CLINICAL SUPPORT (OUTPATIENT)
Dept: FAMILY MEDICINE | Facility: CLINIC | Age: 55
End: 2020-12-29
Payer: COMMERCIAL

## 2020-12-29 VITALS
DIASTOLIC BLOOD PRESSURE: 90 MMHG | HEIGHT: 61 IN | WEIGHT: 130.81 LBS | BODY MASS INDEX: 24.7 KG/M2 | RESPIRATION RATE: 18 BRPM | HEART RATE: 68 BPM | TEMPERATURE: 98 F | SYSTOLIC BLOOD PRESSURE: 132 MMHG

## 2020-12-29 DIAGNOSIS — Z87.440 HISTORY OF RECURRENT UTIS: ICD-10-CM

## 2020-12-29 DIAGNOSIS — N30.01 ACUTE CYSTITIS WITH HEMATURIA: ICD-10-CM

## 2020-12-29 DIAGNOSIS — R30.0 DYSURIA: Primary | ICD-10-CM

## 2020-12-29 DIAGNOSIS — R30.0 DYSURIA: ICD-10-CM

## 2020-12-29 LAB
BACTERIA SPEC CULT: NORMAL
BILIRUB SERPL-MCNC: NORMAL MG/DL
BLOOD URINE, POC: NORMAL
CASTS: NORMAL
COLOR, POC UA: NORMAL
CRYSTALS: NORMAL
GLUCOSE UR QL STRIP: NORMAL
KETONES UR QL STRIP: NORMAL
LEUKOCYTE ESTERASE URINE, POC: NORMAL
NITRITE, POC UA: NORMAL
PH, POC UA: 5
PROTEIN, POC: NORMAL
RBC CELLS COUNTED: NORMAL
SPECIFIC GRAVITY, POC UA: 1.02
UROBILINOGEN, POC UA: NORMAL
WHITE BLOOD CELLS: NORMAL

## 2020-12-29 PROCEDURE — 1125F PR PAIN SEVERITY QUANTIFIED, PAIN PRESENT: ICD-10-PCS | Mod: S$GLB,,, | Performed by: FAMILY MEDICINE

## 2020-12-29 PROCEDURE — 3008F BODY MASS INDEX DOCD: CPT | Mod: CPTII,S$GLB,, | Performed by: FAMILY MEDICINE

## 2020-12-29 PROCEDURE — 1125F AMNT PAIN NOTED PAIN PRSNT: CPT | Mod: S$GLB,,, | Performed by: FAMILY MEDICINE

## 2020-12-29 PROCEDURE — 3008F PR BODY MASS INDEX (BMI) DOCUMENTED: ICD-10-PCS | Mod: CPTII,S$GLB,, | Performed by: FAMILY MEDICINE

## 2020-12-29 PROCEDURE — 81001 URINALYSIS AUTO W/SCOPE: CPT | Mod: S$GLB,,, | Performed by: FAMILY MEDICINE

## 2020-12-29 PROCEDURE — 87086 URINE CULTURE/COLONY COUNT: CPT

## 2020-12-29 PROCEDURE — 99213 OFFICE O/P EST LOW 20 MIN: CPT | Mod: 25,S$GLB,, | Performed by: FAMILY MEDICINE

## 2020-12-29 PROCEDURE — 99999 PR PBB SHADOW E&M-EST. PATIENT-LVL IV: CPT | Mod: PBBFAC,,, | Performed by: FAMILY MEDICINE

## 2020-12-29 PROCEDURE — 81001 POCT URINALYSIS, DIPSTICK OR TABLET REAGENT, AUTOMATED, WITH MICROSCOP: ICD-10-PCS | Mod: S$GLB,,, | Performed by: FAMILY MEDICINE

## 2020-12-29 PROCEDURE — 99999 PR PBB SHADOW E&M-EST. PATIENT-LVL IV: ICD-10-PCS | Mod: PBBFAC,,, | Performed by: FAMILY MEDICINE

## 2020-12-29 PROCEDURE — 87186 SC STD MICRODIL/AGAR DIL: CPT

## 2020-12-29 PROCEDURE — 99213 PR OFFICE/OUTPT VISIT, EST, LEVL III, 20-29 MIN: ICD-10-PCS | Mod: 25,S$GLB,, | Performed by: FAMILY MEDICINE

## 2020-12-29 PROCEDURE — 87088 URINE BACTERIA CULTURE: CPT

## 2020-12-29 PROCEDURE — 87077 CULTURE AEROBIC IDENTIFY: CPT

## 2020-12-29 RX ORDER — SULFAMETHOXAZOLE AND TRIMETHOPRIM 800; 160 MG/1; MG/1
1 TABLET ORAL 2 TIMES DAILY
Qty: 14 TABLET | Refills: 0 | Status: SHIPPED | OUTPATIENT
Start: 2020-12-29 | End: 2021-01-08

## 2020-12-29 RX ORDER — MONTELUKAST SODIUM 10 MG/1
10 TABLET ORAL NIGHTLY
COMMUNITY
Start: 2020-12-17 | End: 2021-05-07 | Stop reason: SDUPTHER

## 2020-12-29 NOTE — PROGRESS NOTES
Subjective:       Patient ID: Kayla Gray is a 55 y.o. female.    Chief Complaint: Dysuria    Pt is a 55 y.o. female who presents for check up for Dysuria  (primary encounter diagnosis)  Acute cystitis with hematuria. Doing well on current meds. Denies any side effects. Prevention is up to date.    Review of Systems   Constitutional: Negative for appetite change, chills and fever.   HENT: Negative for rhinorrhea, sinus pressure, sore throat and trouble swallowing.    Respiratory: Negative for cough, chest tightness, shortness of breath and wheezing.    Cardiovascular: Negative for chest pain and palpitations.   Gastrointestinal: Negative for abdominal pain, blood in stool, diarrhea, nausea and vomiting.   Genitourinary: Positive for dysuria, frequency and urgency. Negative for flank pain, hematuria, pelvic pain, vaginal bleeding, vaginal discharge and vaginal pain.   Musculoskeletal: Negative for back pain, joint swelling and neck stiffness.   Skin: Negative for rash.   Neurological: Negative for dizziness, weakness, light-headedness, numbness and headaches.   Hematological: Does not bruise/bleed easily.   Psychiatric/Behavioral: Negative for agitation. The patient is not nervous/anxious.        Objective:      Physical Exam  Constitutional:       Appearance: She is well-developed.   HENT:      Head: Normocephalic.   Eyes:      Pupils: Pupils are equal, round, and reactive to light.   Neck:      Musculoskeletal: Normal range of motion and neck supple.      Thyroid: No thyromegaly.   Cardiovascular:      Rate and Rhythm: Normal rate and regular rhythm.   Pulmonary:      Effort: No respiratory distress.      Breath sounds: No wheezing or rales.   Chest:      Chest wall: No tenderness.   Abdominal:      General: There is no distension.      Tenderness: There is abdominal tenderness. There is no guarding or rebound.   Musculoskeletal: Normal range of motion.         General: No tenderness.    Lymphadenopathy:      Cervical: No cervical adenopathy.   Skin:     General: Skin is warm and dry.      Coloration: Skin is not pale.      Findings: No rash.   Neurological:      Mental Status: She is alert and oriented to person, place, and time.      Cranial Nerves: No cranial nerve deficit.      Motor: No abnormal muscle tone.      Coordination: Coordination normal.      Deep Tendon Reflexes: Reflexes are normal and symmetric. Reflexes normal.   Psychiatric:         Thought Content: Thought content normal.         Judgment: Judgment normal.         Assessment:       1. Dysuria    2. Acute cystitis with hematuria        Plan:   Kayla was seen today for dysuria.    Diagnoses and all orders for this visit:    Dysuria  -     POCT URINE DIPSTICK WITH MICROSCOPE, AUTOMATED; Future  -     POCT Urine Sediment Exam; Future    Acute cystitis with hematuria

## 2020-12-31 ENCOUNTER — PATIENT MESSAGE (OUTPATIENT)
Dept: FAMILY MEDICINE | Facility: CLINIC | Age: 55
End: 2020-12-31

## 2020-12-31 ENCOUNTER — HOSPITAL ENCOUNTER (OUTPATIENT)
Dept: RADIOLOGY | Facility: HOSPITAL | Age: 55
Discharge: HOME | End: 2020-12-31
Attending: FAMILY MEDICINE
Payer: COMMERCIAL

## 2020-12-31 DIAGNOSIS — Z87.440 HISTORY OF RECURRENT UTIS: ICD-10-CM

## 2020-12-31 LAB — BACTERIA UR CULT: ABNORMAL

## 2020-12-31 PROCEDURE — 76770 US EXAM ABDO BACK WALL COMP: CPT | Mod: TC

## 2020-12-31 PROCEDURE — 76770 US RETROPERITONEAL COMPLETE: ICD-10-PCS | Mod: 26,,, | Performed by: RADIOLOGY

## 2020-12-31 PROCEDURE — 76770 US EXAM ABDO BACK WALL COMP: CPT | Mod: 26,,, | Performed by: RADIOLOGY

## 2021-01-02 NOTE — PROGRESS NOTES
The following lab results were abnormal with the culture showing an E coli UTI; the Bactrim is sens/ and good for your infection Ms Bridget..

## 2021-02-04 ENCOUNTER — OFFICE VISIT (OUTPATIENT)
Dept: OBSTETRICS AND GYNECOLOGY | Facility: CLINIC | Age: 56
End: 2021-02-04
Payer: COMMERCIAL

## 2021-02-04 VITALS
RESPIRATION RATE: 13 BRPM | BODY MASS INDEX: 25.9 KG/M2 | HEART RATE: 90 BPM | HEIGHT: 61 IN | SYSTOLIC BLOOD PRESSURE: 130 MMHG | WEIGHT: 137.19 LBS | DIASTOLIC BLOOD PRESSURE: 84 MMHG

## 2021-02-04 DIAGNOSIS — Z01.419 WELL WOMAN EXAM WITH ROUTINE GYNECOLOGICAL EXAM: Primary | ICD-10-CM

## 2021-02-04 DIAGNOSIS — Z12.31 ENCOUNTER FOR SCREENING MAMMOGRAM FOR MALIGNANT NEOPLASM OF BREAST: ICD-10-CM

## 2021-02-04 DIAGNOSIS — Z90.710 HISTORY OF HYSTERECTOMY: ICD-10-CM

## 2021-02-04 PROCEDURE — 1126F AMNT PAIN NOTED NONE PRSNT: CPT | Mod: S$GLB,,, | Performed by: OBSTETRICS & GYNECOLOGY

## 2021-02-04 PROCEDURE — 99999 PR PBB SHADOW E&M-EST. PATIENT-LVL III: CPT | Mod: PBBFAC,,, | Performed by: OBSTETRICS & GYNECOLOGY

## 2021-02-04 PROCEDURE — 99999 PR PBB SHADOW E&M-EST. PATIENT-LVL III: ICD-10-PCS | Mod: PBBFAC,,, | Performed by: OBSTETRICS & GYNECOLOGY

## 2021-02-04 PROCEDURE — 1126F PR PAIN SEVERITY QUANTIFIED, NO PAIN PRESENT: ICD-10-PCS | Mod: S$GLB,,, | Performed by: OBSTETRICS & GYNECOLOGY

## 2021-02-04 PROCEDURE — 3008F BODY MASS INDEX DOCD: CPT | Mod: CPTII,S$GLB,, | Performed by: OBSTETRICS & GYNECOLOGY

## 2021-02-04 PROCEDURE — 99396 PR PREVENTIVE VISIT,EST,40-64: ICD-10-PCS | Mod: S$GLB,,, | Performed by: OBSTETRICS & GYNECOLOGY

## 2021-02-04 PROCEDURE — 3008F PR BODY MASS INDEX (BMI) DOCUMENTED: ICD-10-PCS | Mod: CPTII,S$GLB,, | Performed by: OBSTETRICS & GYNECOLOGY

## 2021-02-04 PROCEDURE — 99396 PREV VISIT EST AGE 40-64: CPT | Mod: S$GLB,,, | Performed by: OBSTETRICS & GYNECOLOGY

## 2021-02-04 RX ORDER — CONJUGATED ESTROGENS 0.62 MG/G
1 CREAM VAGINAL DAILY
Qty: 3 APPLICATOR | Refills: 3 | Status: SHIPPED | OUTPATIENT
Start: 2021-02-04 | End: 2022-06-15 | Stop reason: SDUPTHER

## 2021-02-04 RX ORDER — CONJUGATED ESTROGENS 0.62 MG/G
CREAM VAGINAL
COMMUNITY
Start: 2019-01-29 | End: 2021-02-04 | Stop reason: SDUPTHER

## 2021-03-25 ENCOUNTER — HOSPITAL ENCOUNTER (OUTPATIENT)
Dept: RADIOLOGY | Facility: HOSPITAL | Age: 56
Discharge: HOME | End: 2021-03-25
Attending: OBSTETRICS & GYNECOLOGY
Payer: COMMERCIAL

## 2021-03-25 VITALS — BODY MASS INDEX: 24.55 KG/M2 | WEIGHT: 130 LBS | HEIGHT: 61 IN

## 2021-03-25 DIAGNOSIS — Z12.31 ENCOUNTER FOR SCREENING MAMMOGRAM FOR MALIGNANT NEOPLASM OF BREAST: ICD-10-CM

## 2021-03-25 PROCEDURE — 77063 MAMMO DIGITAL SCREENING BILAT WITH TOMO: ICD-10-PCS | Mod: 26,,, | Performed by: RADIOLOGY

## 2021-03-25 PROCEDURE — 77067 MAMMO DIGITAL SCREENING BILAT WITH TOMO: ICD-10-PCS | Mod: 26,,, | Performed by: RADIOLOGY

## 2021-03-25 PROCEDURE — 77063 BREAST TOMOSYNTHESIS BI: CPT | Mod: 26,,, | Performed by: RADIOLOGY

## 2021-03-25 PROCEDURE — 77067 SCR MAMMO BI INCL CAD: CPT | Mod: TC

## 2021-03-25 PROCEDURE — 77067 SCR MAMMO BI INCL CAD: CPT | Mod: 26,,, | Performed by: RADIOLOGY

## 2021-11-23 NOTE — OP NOTE
DATE OF PROCEDURE:  05/18/2017    SURGEON:  Cirilo Gong M.D.    ASSISTANT:  Faby Banda and Jostin Sanders M.D. (RES)    PREOPERATIVE DIAGNOSES:  1.  Elevated CA-125.  2.  Pelvic mass.    POSTOPERATIVE DIAGNOSES:  1.  Elevated CA-125.  2.  Pelvic mass.    PROCEDURE:  Robotic-assisted laparoscopic hysterectomy, bilateral   salpingo-oophorectomy and mass resection.    COMPLICATIONS:  None.    ESTIMATED BLOOD LOSS:  Less than 50 mL    ANESTHESIA:  General.    INTRAOPERATIVE FINDINGS:  Included a 12-week fibroid uterus and a simple 8 cm   left ovarian cyst.  The patient had no other abnormalities noted on inspection   of the upper abdomen or pelvis.    PROCEDURE IN DETAIL:  Informed consent was obtained, the patient was taken to   the Operating Suite.  General anesthesia was administered.  Once this was felt   to be adequate, she was in dorsal lithotomy position with her arms tucked.  The   abdomen and pelvis were prepped and draped in the usual sterile fashion and a   Cortes catheter was placed to gravity drainage.  A speculum was placed in the   vagina and the cervix was visualized, grasped with single-tooth tenaculum and   sounded to 12 cm.  Serial dilation of the cervix was performed and a large VCare   manipulator was placed without difficulty.  Attention was turned to the   abdominal portion of the procedure where a Veress needle was placed through the   umbilicus.  Opening pressure was noted to be 2.  Pneumoperitoneum was obtained   with carbon dioxide and once this was felt to be adequate, an 8 mm incision was   made above the umbilicus.  An 8 mm trocar was placed through this and   intraperitoneal placement was confirmed with the camera.  Lateral robotic   trocars x3 were placed through 8 mm incisions and a right upper quadrant 8 mm   accessory AirSeal port was placed.  The patient was placed in steep   Trendelenburg.  The robot was docked and instruments were passed in the   operative field.  The above  stated findings were noted.  Attention was first   turned to the left side where left round ligament was transected after   sacrificing with bipolar cautery.  The anterior and posterior leaflets of the   broad ligament were opened and the mass was elevated.  The ureter was identified   and a window was made beneath the infundibulopelvic ligament.  This was   sacrificed with bipolar cautery and transected.  In order to facilitate   visualization, the uteroovarian ligament was then isolated and sacrificed with   bipolar cautery and transected.  Once this had been performed, attention was   turned to the right side, which was performed in an identical manner.    Anteriorly, the vesicouterine peritoneum was incised and the bladder was   mobilized inferiorly over the ring.  Posteriorly, a 4 o'clock to 8 o'clock   colpotomy was performed.  Bilateral cardinal ligaments and uterine vessels   skeletonized their peritoneal attachments, sacrificed with bipolar cautery and   transected.  Circumferential colpotomy was completed.  Uterus, cervix, right   tube and ovary were removed.  The left tube and ovary removed separately intact   and then opened on the field and noted to be smooth and without defect.  At this   point in time, the pelvis was irrigated.  The vaginal cuff was made hemostatic   with electrocautery.  Given the benign nature of the mass, the cuff was then   closed in running nonlocking 0 Vicryl suture tied in the midline.  Vitagel was   applied and the instruments were removed, the robot was undocked and   pneumoperitoneum was evacuated.  The patient was flattened.  All three trocars   were removed.  Port sites were inspected and made hemostatic with electrocautery   and closed with subcuticular 4-0 Monocryl suture.  Steri-Strips were applied,   and the patient was awoken and taken to Recovery Room in stable condition.  Of   note, I was present for and performed all key aspects of procedure.  Faby    Solo's expertise was needed as first assistant as there was no qualified   resident available.      STEPHEN  dd: 05/22/2017 20:20:06 (CDT)  td: 05/22/2017 21:49:04 (CDT)  Doc ID   #[id number]  Job ID #[id number]    CC:    Med Reconciliation

## 2022-06-03 NOTE — PROGRESS NOTES
6/3/2022  S/p multiple toe amputations.    Subjective:      Patient ID: Kayla Gray is a 52 y.o. female.    Chief Complaint: elevated ca125 (Referred by Dr. Chakraborty)      HPI  Presents today at the request of Dr. Chakraborty secondary to an ovarian cyst recently detected on MRI for hip pain evaluation.  In addition, she also had a CT A/P that revealed the same, with no evidence of ascites or carcinomatosis.  Exam in his office was not concerning for cancer, and U/S revealed a small fibroid uterus, normal right ovary, and an 8 cm simple, unilocular cyst of the left ovary.  She had no free fluid or other concerning findings on U/S.  CA-125 was performed and was elevated at 65.  She denies abdominal complaints, early satiety, or changes in bladder or bowel function.  She still has menses, with LMP 3/27/18 , and takes OCP's that she started 3 weeks ago in an attempt to decrease the size of the ovarian cyst.  Only abdominal surgery was C/S x 4.  Only pertinent FH is a PGM diagnosed with  breast cancer.  No h/o ovarian cancers.  Review of Systems   Constitutional: Negative for activity change, appetite change, chills, fatigue and fever.   HENT: Negative for hearing loss, mouth sores, nosebleeds, sore throat and tinnitus.    Eyes: Negative for visual disturbance.   Respiratory: Negative for cough, chest tightness, shortness of breath and wheezing.    Cardiovascular: Negative for chest pain and leg swelling.   Gastrointestinal: Positive for diarrhea. Negative for abdominal distention, abdominal pain, blood in stool, constipation, nausea and vomiting.   Genitourinary: Negative for dysuria, flank pain, frequency, hematuria, pelvic pain, vaginal bleeding, vaginal discharge and vaginal pain.   Musculoskeletal: Negative for arthralgias and back pain.   Skin: Negative for rash.   Neurological: Negative for dizziness, seizures, syncope, weakness and numbness.   Hematological: Does not bruise/bleed easily.   Psychiatric/Behavioral: Negative for confusion and sleep  disturbance. The patient is not nervous/anxious.         Past Medical History:   Diagnosis Date    Asthma     Avascular necrosis of bone of right hip     GERD (gastroesophageal reflux disease)      Past Surgical History:   Procedure Laterality Date     SECTION, CLASSIC       Family History   Problem Relation Age of Onset    Heart disease Father     Cancer Maternal Grandfather     Cancer Paternal Grandmother     Heart disease Paternal Grandmother      Social History     Social History    Marital status:      Spouse name: N/A    Number of children: N/A    Years of education: N/A     Occupational History    Not on file.     Social History Main Topics    Smoking status: Never Smoker    Smokeless tobacco: Not on file    Alcohol use No    Drug use: No    Sexual activity: Not on file     Other Topics Concern    Not on file     Social History Narrative     Current Outpatient Prescriptions   Medication Sig    albuterol (VENTOLIN HFA) 90 mcg/actuation inhaler Inhale 2 puffs into the lungs every 6 (six) hours as needed.    esomeprazole (NEXIUM) 40 MG capsule Take 1 capsule (40 mg total) by mouth before breakfast.    fluticasone (FLONASE) 50 mcg/actuation nasal spray 2 sprays by Each Nare route once daily.    norethindrone-ethinyl estradiol (NECON 35, 28,) 1-35 mg-mcg per tablet Take 1 tablet by mouth once daily.    budesonide (PULMICORT) 0.5 mg/2 mL nebulizer solution Take 2 mLs (0.5 mg total) by nebulization once daily.    levalbuterol (XOPENEX) 1.25 mg/0.5 mL nebulizer solution Take 0.5 mLs (1.25 mg total) by nebulization every 4 (four) hours as needed for Wheezing.    [DISCONTINUED] fluticasone-salmeterol 250-50 mcg/dose (ADVAIR) 250-50 mcg/dose diskus inhaler Inhale 1 puff into the lungs 2 (two) times daily.     No current facility-administered medications for this visit.      Review of patient's allergies indicates:   Allergen Reactions    Asa [aspirin] Swelling    Codeine  Nausea And Vomiting       Objective:   Physical Exam:   Constitutional: She is oriented to person, place, and time. She appears well-developed and well-nourished. No distress.    HENT:   Head: Normocephalic and atraumatic.    Eyes: No scleral icterus.    Neck: Normal range of motion. Neck supple.    Cardiovascular: Normal rate and intact distal pulses.  Exam reveals no cyanosis and no edema.     Pulmonary/Chest: Effort normal. No respiratory distress. She exhibits no tenderness.        Abdominal: Soft. Normal appearance. She exhibits no distension, no fluid wave, no ascites and no mass (healed pfannensteil incision). There is no tenderness. There is no rigidity, no rebound and no guarding. No hernia.         Genitourinary: Rectum normal and vagina normal. Pelvic exam was performed with patient supine. There is no rash, tenderness or lesion on the right labia. There is no rash, tenderness or lesion on the left labia. Uterus is deviated, enlarged and hosting fibroids. Uterus is not fixed, not tender and not experiencing uterine prolapse. Cervix is normal. Right adnexum displays no mass, no tenderness and no fullness. Left adnexum displays fullness. Left adnexum displays no mass and no tenderness. No bleeding in the vagina. No vaginal discharge found. Labial bartholins normal.Cervix exhibits no motion tenderness, no discharge and no friability.        Uterus Size: 10 cm   Musculoskeletal: Normal range of motion and moves all extremeties. She exhibits no edema.      Lymphadenopathy:     She has no cervical adenopathy.        Right: No inguinal adenopathy present.        Left: No inguinal adenopathy present.    Neurological: She is alert and oriented to person, place, and time.    Skin: Skin is warm. No rash noted. No cyanosis or erythema.    Psychiatric: She has a normal mood and affect. Thought content normal.       Assessment:     1. Pelvic mass in female    2. Elevated CA-125        Plan:       Counseled the patient  on exam, lab, and imaging results.  Risk of malignancy is less than 5% with a unilocular cyst based on the [place] data.  I feel she has reasons for a mildly elevated CA-125 including fibroids as well as menstruation.  However, given her age and size greater than 5 cm, I did recommend surgery to definitively diagnose and treat her.  We discussed RALH/BSO/poss staging. The risks, benefits, and indications of the procedure were discussed with the patient and her .  These included bleeding, infection, damage to surrounding tissues, conversion to laparotomy, and the possibility of major complications including death.  She voiced understanding, all questions were answered and consents were signed.

## 2022-06-15 DIAGNOSIS — Z12.31 ENCOUNTER FOR SCREENING MAMMOGRAM FOR MALIGNANT NEOPLASM OF BREAST: Primary | ICD-10-CM

## 2022-06-15 RX ORDER — CONJUGATED ESTROGENS 0.62 MG/G
1 CREAM VAGINAL DAILY
Qty: 3 APPLICATOR | Refills: 0 | Status: SHIPPED | OUTPATIENT
Start: 2022-06-15 | End: 2022-10-20 | Stop reason: SDUPTHER

## 2022-06-15 NOTE — TELEPHONE ENCOUNTER
----- Message from Megha Leon sent at 6/15/2022  9:42 AM CDT -----  Contact: PATIENT  Kayla Gray  MRN: [id number]  : 1965  PCP: Primary Doctor No  Home Phone      [phone]  Work Phone      Not on file.  Mobile          [phone]      MESSAGE: Patient would like to  a written prescription for Premarin .625 mg Cream.  She has an appointment scheduled for 22.  Please put order in for the mammogram that is scheduled for 22        Phone: [phone]

## 2022-06-15 NOTE — TELEPHONE ENCOUNTER
Kayla desire refill of Premarin, pt's wwe is scheduled 9/2022. Pt is asking for the prescription to be printed for her to . Please advise  Patient Active Problem List   Diagnosis    Moderate persistent asthma without complication    GERD (gastroesophageal reflux disease)    Pelvic mass in female    Elevated CA-125    Pelvic mass    S/P laparoscopic hysterectomy with BSO- robotic assisted    Avascular necrosis of bone of right hip    Allergy    Acute cystitis with hematuria    Dysuria     Prior to Admission medications    Medication Sig Start Date End Date Taking? Authorizing Provider   albuterol (VENTOLIN HFA) 90 mcg/actuation inhaler Inhale 2 puffs into the lungs every 6 (six) hours as needed. 5/19/22   Esau Foster,    atorvastatin (LIPITOR) 20 MG tablet Take 20 mg by mouth every evening. 3/24/21   Historical Provider   clopidogreL (PLAVIX) 75 mg tablet Take 75 mg by mouth once daily. 3/24/21   Historical Provider   conjugated estrogens (PREMARIN) vaginal cream Place 1 g vaginally once daily. 2/4/21 5/19/22  Brii Pettit MD   esomeprazole (NEXIUM) 40 MG capsule Take 1 capsule (40 mg total) by mouth once daily. 5/19/22 8/17/22  Esau Foster,    fluticasone (FLONASE) 50 mcg/actuation nasal spray 2 sprays by Each Nare route once daily. 6/4/14   Shell Mckee NP   hydroCHLOROthiazide (HYDRODIURIL) 12.5 MG Tab Take 12.5 mg by mouth once daily. 3/24/21   Historical Provider   magnesium 250 mg Tab Take by mouth once.    Historical Provider   montelukast (SINGULAIR) 10 mg tablet Take 1 tablet (10 mg total) by mouth every evening. 5/19/22   Esau Foster DO   NON FORMULARY MEDICATION Calcium  with magnesium, zinc plus vitamin d3    Historical Provider   psyllium husk (DAILY FIBER ORAL) Take by mouth.    Historical Provider   SYMBICORT 80-4.5 mcg/actuation HFAA INHALE 2 PUFFS INTO THE LUNGS 2 (TWO) TIMES A DAY. CONTROLLER  Patient not taking: Reported on 5/19/2022 8/27/20    Shell Mckee, NP   tacrolimus (PROTOPIC) 0.1 % ointment Apply 1 application topically 2 (two) times daily. 5/10/22 5/10/22   Historical Provider   vitamin D (VITAMIN D3) 1000 units Tab Take 1,000 Units by mouth once daily.    Historical Provider   fluticasone-salmeterol 250-50 mcg/dose (ADVAIR) 250-50 mcg/dose diskus inhaler Inhale 1 puff into the lungs 2 (two) times daily.  4/29/13  Historical Provider

## 2022-06-16 RX ORDER — CONJUGATED ESTROGENS 0.62 MG/G
1 CREAM VAGINAL DAILY
Qty: 3 APPLICATOR | Refills: 0 | Status: CANCELLED | OUTPATIENT
Start: 2022-06-16 | End: 2023-06-11

## 2022-06-26 NOTE — ANESTHESIA POSTPROCEDURE EVALUATION
"Anesthesia Post Evaluation    Patient: Kayla Gray    Procedure(s) Performed: Procedure(s) (LRB):  XI ROBOTIC ASSISTED LAPAROSCOPIC HYSTERECTOMY (N/A)  XI ROBOT ASSISTED LAPAROSCOPIC SALPINGO-OOPHERECTOMY (Bilateral)    Final Anesthesia Type: general  Patient location during evaluation: PACU  Patient participation: Yes- Able to Participate  Level of consciousness: awake and alert  Post-procedure vital signs: reviewed and stable  Pain management: adequate  Airway patency: patent  PONV status at discharge: No PONV  Anesthetic complications: no      Cardiovascular status: blood pressure returned to baseline and stable  Respiratory status: unassisted, spontaneous ventilation and room air  Hydration status: euvolemic  Follow-up not needed.        Visit Vitals    BP (!) 161/67    Pulse 78    Temp 36.8 °C (98.2 °F) (Oral)    Resp 16    Ht 5' 1" (1.549 m)    Wt 59 kg (130 lb)    SpO2 99%    BMI 24.56 kg/m2       Pain/Marc Score: Pain Assessment Performed: Yes (5/18/2017  1:07 PM)  Presence of Pain: non-verbal indicators absent (asleep) (5/18/2017  1:07 PM)  Pain Rating Prior to Med Admin: 7 (5/18/2017 12:27 PM)  Marc Score: 9 (5/18/2017  1:07 PM)      " show

## 2022-09-06 ENCOUNTER — HOSPITAL ENCOUNTER (OUTPATIENT)
Dept: RADIOLOGY | Facility: HOSPITAL | Age: 57
Discharge: HOME | End: 2022-09-06
Attending: OBSTETRICS & GYNECOLOGY
Payer: COMMERCIAL

## 2022-09-06 VITALS — BODY MASS INDEX: 24.17 KG/M2 | WEIGHT: 128 LBS | HEIGHT: 61 IN

## 2022-09-06 DIAGNOSIS — Z12.31 ENCOUNTER FOR SCREENING MAMMOGRAM FOR MALIGNANT NEOPLASM OF BREAST: ICD-10-CM

## 2022-09-06 PROCEDURE — 77067 MAMMO DIGITAL SCREENING BILAT WITH TOMO: ICD-10-PCS | Mod: 26,,, | Performed by: RADIOLOGY

## 2022-09-06 PROCEDURE — 77063 MAMMO DIGITAL SCREENING BILAT WITH TOMO: ICD-10-PCS | Mod: 26,,, | Performed by: RADIOLOGY

## 2022-09-06 PROCEDURE — 77063 BREAST TOMOSYNTHESIS BI: CPT | Mod: 26,,, | Performed by: RADIOLOGY

## 2022-09-06 PROCEDURE — 77067 SCR MAMMO BI INCL CAD: CPT | Mod: 26,,, | Performed by: RADIOLOGY

## 2022-09-06 PROCEDURE — 77063 BREAST TOMOSYNTHESIS BI: CPT | Mod: TC

## 2022-10-20 ENCOUNTER — OFFICE VISIT (OUTPATIENT)
Dept: OBSTETRICS AND GYNECOLOGY | Facility: CLINIC | Age: 57
End: 2022-10-20
Payer: COMMERCIAL

## 2022-10-20 VITALS
WEIGHT: 135.81 LBS | HEIGHT: 61 IN | SYSTOLIC BLOOD PRESSURE: 132 MMHG | BODY MASS INDEX: 25.64 KG/M2 | HEART RATE: 85 BPM | DIASTOLIC BLOOD PRESSURE: 84 MMHG | OXYGEN SATURATION: 98 % | RESPIRATION RATE: 18 BRPM

## 2022-10-20 DIAGNOSIS — N95.2 VAGINAL ATROPHY: ICD-10-CM

## 2022-10-20 DIAGNOSIS — Z01.419 WELL WOMAN EXAM WITH ROUTINE GYNECOLOGICAL EXAM: Primary | ICD-10-CM

## 2022-10-20 DIAGNOSIS — R68.82 DECREASED LIBIDO: ICD-10-CM

## 2022-10-20 DIAGNOSIS — Z90.710 HISTORY OF HYSTERECTOMY: ICD-10-CM

## 2022-10-20 DIAGNOSIS — Z12.31 ENCOUNTER FOR SCREENING MAMMOGRAM FOR MALIGNANT NEOPLASM OF BREAST: ICD-10-CM

## 2022-10-20 PROCEDURE — 99999 PR PBB SHADOW E&M-EST. PATIENT-LVL IV: CPT | Mod: PBBFAC,,, | Performed by: OBSTETRICS & GYNECOLOGY

## 2022-10-20 PROCEDURE — 1160F PR REVIEW ALL MEDS BY PRESCRIBER/CLIN PHARMACIST DOCUMENTED: ICD-10-PCS | Mod: CPTII,S$GLB,, | Performed by: OBSTETRICS & GYNECOLOGY

## 2022-10-20 PROCEDURE — 1159F PR MEDICATION LIST DOCUMENTED IN MEDICAL RECORD: ICD-10-PCS | Mod: CPTII,S$GLB,, | Performed by: OBSTETRICS & GYNECOLOGY

## 2022-10-20 PROCEDURE — 99396 PREV VISIT EST AGE 40-64: CPT | Mod: S$GLB,,, | Performed by: OBSTETRICS & GYNECOLOGY

## 2022-10-20 PROCEDURE — 3075F PR MOST RECENT SYSTOLIC BLOOD PRESS GE 130-139MM HG: ICD-10-PCS | Mod: CPTII,S$GLB,, | Performed by: OBSTETRICS & GYNECOLOGY

## 2022-10-20 PROCEDURE — 1159F MED LIST DOCD IN RCRD: CPT | Mod: CPTII,S$GLB,, | Performed by: OBSTETRICS & GYNECOLOGY

## 2022-10-20 PROCEDURE — 1160F RVW MEDS BY RX/DR IN RCRD: CPT | Mod: CPTII,S$GLB,, | Performed by: OBSTETRICS & GYNECOLOGY

## 2022-10-20 PROCEDURE — 3079F DIAST BP 80-89 MM HG: CPT | Mod: CPTII,S$GLB,, | Performed by: OBSTETRICS & GYNECOLOGY

## 2022-10-20 PROCEDURE — 99396 PR PREVENTIVE VISIT,EST,40-64: ICD-10-PCS | Mod: S$GLB,,, | Performed by: OBSTETRICS & GYNECOLOGY

## 2022-10-20 PROCEDURE — 3079F PR MOST RECENT DIASTOLIC BLOOD PRESSURE 80-89 MM HG: ICD-10-PCS | Mod: CPTII,S$GLB,, | Performed by: OBSTETRICS & GYNECOLOGY

## 2022-10-20 PROCEDURE — 3075F SYST BP GE 130 - 139MM HG: CPT | Mod: CPTII,S$GLB,, | Performed by: OBSTETRICS & GYNECOLOGY

## 2022-10-20 PROCEDURE — 99999 PR PBB SHADOW E&M-EST. PATIENT-LVL IV: ICD-10-PCS | Mod: PBBFAC,,, | Performed by: OBSTETRICS & GYNECOLOGY

## 2022-10-20 RX ORDER — CONJUGATED ESTROGENS 0.62 MG/G
1 CREAM VAGINAL DAILY
Qty: 3 APPLICATOR | Refills: 11 | Status: SHIPPED | OUTPATIENT
Start: 2022-10-20 | End: 2023-10-11 | Stop reason: SDUPTHER

## 2022-10-20 NOTE — PROGRESS NOTES
Subjective:    Patient ID: Kayla Gray is a 57 y.o. y.o. female.     Chief Complaint: Annual Well Woman Exam     History of Present Illness:  Kayla presents today for Annual Well Woman exam. She describes her menses as  absent .She denies pelvic pain.  She denies breast tenderness, masses, nipple discharge. She denies difficulty with urination or bowel movements. She denies menopausal symptoms such as hotflashes, vaginal dryness, and night sweats. She denies bloating, early satiety, or weight changes. She is sexually active. Contraception is by no method.    Patient does have concerns for decreased libido which has worsened since surgery and transitioning through menopause.     The following portions of the patient's history were reviewed and updated as appropriate: allergies, current medications, past family history, past medical history, past social history, past surgical history and problem list.      Menstrual History:   Patient's last menstrual period was 2017..     OB History          5    Para   4    Term   4            AB   1    Living             SAB   1    IAB        Ectopic        Multiple        Live Births                     The following portions of the patient's history were reviewed and updated as appropriate: allergies, current medications, past family history, past medical history, past social history, past surgical history and problem list.        ROS:     Review of Systems   Constitutional:  Negative for activity change, appetite change, chills, diaphoresis, fatigue, fever and unexpected weight change.   HENT:  Negative for mouth sores and tinnitus.    Eyes:  Negative for discharge and visual disturbance.   Respiratory:  Negative for cough, shortness of breath and wheezing.    Cardiovascular:  Negative for chest pain, palpitations and leg swelling.   Gastrointestinal:  Negative for abdominal pain, bloating, blood in stool, constipation, diarrhea, nausea and  "vomiting.   Endocrine: Negative for diabetes, hair loss, hot flashes, hyperthyroidism and hypothyroidism.   Genitourinary:  Negative for dysuria, flank pain, frequency, genital sores, hematuria, urgency, vaginal bleeding, vaginal discharge, vaginal pain, postcoital bleeding and vaginal odor.   Musculoskeletal:  Negative for back pain, joint swelling and myalgias.   Integumentary:  Negative for rash, acne, breast mass, nipple discharge and breast skin changes.   Neurological:  Negative for seizures, syncope, numbness and headaches.   Hematological:  Negative for adenopathy. Does not bruise/bleed easily.   Psychiatric/Behavioral:  Negative for depression and sleep disturbance. The patient is not nervous/anxious.    Breast: Negative for mass, mastodynia, nipple discharge and skin changes    Objective:    Vital Signs:  Vitals:    10/20/22 1041   BP: 132/84   Pulse: 85   Resp: 18   SpO2: 98%   Weight: 61.6 kg (135 lb 12.8 oz)   Height: 5' 1" (1.549 m)         Physical Exam:  General:  alert, cooperative, appears stated age   Skin:  Skin color, texture, turgor normal. No rashes or lesions   HEENT:  conjunctivae/corneas clear. PERRL.   Neck: supple, trachea midline, no adenopathy or thyromegally   Respiratory:  clear to auscultation bilaterally   Heart:  regular rate and rhythm, S1, S2 normal, no murmur, click, rub or gallop   Breasts:  no discharge, erythema, or tenderness   Abdomen:  normal findings: bowel sounds normal, no masses palpable, no organomegaly and soft, non-tender   Pelvis: External genitalia: normal general appearance  Urinary system: urethral meatus normal, bladder nontender  Vaginal: atrophic mucosa  Cervix: absent, removed surgically  Uterus: absent, removed surgically  Adnexa: non palpable   Extremities: Normal ROM; no edema, no cyanosis   Neurologial: Normal strength and tone. No focal numbness or weakness. Reflexes 2+ and equal.   Psychiatric: normal mood, speech, dress, and thought processes "         Assessment:       Healthy female exam.     1. Well woman exam with routine gynecological exam    2. History of hysterectomy    3. Encounter for screening mammogram for malignant neoplasm of breast    4. Decreased libido          Plan:       pap smear deferred per guidelines    MMG UTD  Discussed weight bearing exercise, calcium, and vitamin d for osteoporosis prevention  Discussed colonoscopy with patient; she is due and will contact Dr. Bello office to get scheduled.   Refill Premarin cream  Discussed decreased libido and some OTC medications, behavioral modifications, etc  RTC in 1 year    COUNSELING:  Kayla was counseled on STD pevention, use and side-effects of various contraceptive measures, A.C.O.G. Pap guidelines and recommendations for yearly pelvic exams in addition to recommendations for monthly self breast exams; to see her PCP for other health maintenance.

## 2023-08-29 ENCOUNTER — TELEPHONE (OUTPATIENT)
Dept: OBSTETRICS AND GYNECOLOGY | Facility: CLINIC | Age: 58
End: 2023-08-29
Payer: COMMERCIAL

## 2023-08-29 DIAGNOSIS — Z12.31 BREAST CANCER SCREENING BY MAMMOGRAM: Primary | ICD-10-CM

## 2023-08-29 NOTE — TELEPHONE ENCOUNTER
----- Message from Shelly Cardoza MA sent at 8/29/2023  3:08 PM CDT -----  Contact: self  Kayla Gray  MRN: [id number]  Home Phone      [phone]  Work Phone      Not on file.  Mobile          [phone]    Patient Care Team:  No, Primary Doctor as PCP - Ashvin Friedman MD as Obstetrician (Obstetrics)  OB? No  What phone number can you be reached at? [phone]  Message: Please link mammo orders to appt 10/11/23.

## 2023-10-11 ENCOUNTER — HOSPITAL ENCOUNTER (OUTPATIENT)
Dept: RADIOLOGY | Facility: HOSPITAL | Age: 58
Discharge: HOME | End: 2023-10-11
Attending: OBSTETRICS & GYNECOLOGY
Payer: COMMERCIAL

## 2023-10-11 ENCOUNTER — OFFICE VISIT (OUTPATIENT)
Dept: OBSTETRICS AND GYNECOLOGY | Facility: CLINIC | Age: 58
End: 2023-10-11
Payer: COMMERCIAL

## 2023-10-11 VITALS
HEIGHT: 61 IN | OXYGEN SATURATION: 98 % | RESPIRATION RATE: 18 BRPM | BODY MASS INDEX: 24.52 KG/M2 | SYSTOLIC BLOOD PRESSURE: 134 MMHG | WEIGHT: 129.88 LBS | DIASTOLIC BLOOD PRESSURE: 82 MMHG | HEART RATE: 82 BPM

## 2023-10-11 DIAGNOSIS — Z90.710 HISTORY OF HYSTERECTOMY: ICD-10-CM

## 2023-10-11 DIAGNOSIS — Z12.31 BREAST CANCER SCREENING BY MAMMOGRAM: ICD-10-CM

## 2023-10-11 DIAGNOSIS — Z01.419 WELL WOMAN EXAM WITH ROUTINE GYNECOLOGICAL EXAM: Primary | ICD-10-CM

## 2023-10-11 PROCEDURE — 77063 BREAST TOMOSYNTHESIS BI: CPT | Mod: 26,,, | Performed by: RADIOLOGY

## 2023-10-11 PROCEDURE — 99396 PREV VISIT EST AGE 40-64: CPT | Mod: S$GLB,,, | Performed by: OBSTETRICS & GYNECOLOGY

## 2023-10-11 PROCEDURE — 3075F PR MOST RECENT SYSTOLIC BLOOD PRESS GE 130-139MM HG: ICD-10-PCS | Mod: CPTII,S$GLB,, | Performed by: OBSTETRICS & GYNECOLOGY

## 2023-10-11 PROCEDURE — 77067 MAMMO DIGITAL SCREENING BILAT WITH TOMO: ICD-10-PCS | Mod: 26,,, | Performed by: RADIOLOGY

## 2023-10-11 PROCEDURE — 3008F PR BODY MASS INDEX (BMI) DOCUMENTED: ICD-10-PCS | Mod: CPTII,S$GLB,, | Performed by: OBSTETRICS & GYNECOLOGY

## 2023-10-11 PROCEDURE — 99999 PR PBB SHADOW E&M-EST. PATIENT-LVL III: CPT | Mod: PBBFAC,,, | Performed by: OBSTETRICS & GYNECOLOGY

## 2023-10-11 PROCEDURE — 3079F PR MOST RECENT DIASTOLIC BLOOD PRESSURE 80-89 MM HG: ICD-10-PCS | Mod: CPTII,S$GLB,, | Performed by: OBSTETRICS & GYNECOLOGY

## 2023-10-11 PROCEDURE — 77067 SCR MAMMO BI INCL CAD: CPT | Mod: TC

## 2023-10-11 PROCEDURE — 99999 PR PBB SHADOW E&M-EST. PATIENT-LVL III: ICD-10-PCS | Mod: PBBFAC,,, | Performed by: OBSTETRICS & GYNECOLOGY

## 2023-10-11 PROCEDURE — 1159F PR MEDICATION LIST DOCUMENTED IN MEDICAL RECORD: ICD-10-PCS | Mod: CPTII,S$GLB,, | Performed by: OBSTETRICS & GYNECOLOGY

## 2023-10-11 PROCEDURE — 77067 SCR MAMMO BI INCL CAD: CPT | Mod: 26,,, | Performed by: RADIOLOGY

## 2023-10-11 PROCEDURE — 3079F DIAST BP 80-89 MM HG: CPT | Mod: CPTII,S$GLB,, | Performed by: OBSTETRICS & GYNECOLOGY

## 2023-10-11 PROCEDURE — 1159F MED LIST DOCD IN RCRD: CPT | Mod: CPTII,S$GLB,, | Performed by: OBSTETRICS & GYNECOLOGY

## 2023-10-11 PROCEDURE — 3075F SYST BP GE 130 - 139MM HG: CPT | Mod: CPTII,S$GLB,, | Performed by: OBSTETRICS & GYNECOLOGY

## 2023-10-11 PROCEDURE — 3008F BODY MASS INDEX DOCD: CPT | Mod: CPTII,S$GLB,, | Performed by: OBSTETRICS & GYNECOLOGY

## 2023-10-11 PROCEDURE — 99396 PR PREVENTIVE VISIT,EST,40-64: ICD-10-PCS | Mod: S$GLB,,, | Performed by: OBSTETRICS & GYNECOLOGY

## 2023-10-11 PROCEDURE — 77063 MAMMO DIGITAL SCREENING BILAT WITH TOMO: ICD-10-PCS | Mod: 26,,, | Performed by: RADIOLOGY

## 2023-10-11 RX ORDER — POTASSIUM CHLORIDE 20 MEQ/1
TABLET, EXTENDED RELEASE ORAL
COMMUNITY
Start: 2023-09-05

## 2023-10-11 RX ORDER — CONJUGATED ESTROGENS 0.62 MG/G
1 CREAM VAGINAL DAILY
Qty: 3 APPLICATOR | Refills: 11 | Status: SHIPPED | OUTPATIENT
Start: 2023-10-11 | End: 2024-10-05

## 2023-10-11 NOTE — PROGRESS NOTES
Subjective:    Patient ID: Kayla Gray is a 58 y.o. y.o. female.     Chief Complaint: Annual Well Woman Exam     History of Present Illness:  Kayla presents today for Annual Well Woman exam. She describes her menses as  absent .She denies pelvic pain.  She denies breast tenderness, masses, nipple discharge. She denies difficulty with urination or bowel movements. She denies menopausal symptoms such as hotflashes, vaginal dryness, and night sweats. She denies bloating, early satiety, or weight changes. She is sexually active. Contraception is by no method.        The following portions of the patient's history were reviewed and updated as appropriate: allergies, current medications, past family history, past medical history, past social history, past surgical history and problem list.      Menstrual History:   Patient's last menstrual period was 2017..     OB History          5    Para   4    Term   4            AB   1    Living             SAB   1    IAB        Ectopic        Multiple        Live Births                     The following portions of the patient's history were reviewed and updated as appropriate: allergies, current medications, past family history, past medical history, past social history, past surgical history and problem list.        ROS:     Review of Systems   Constitutional:  Negative for activity change, appetite change, chills, diaphoresis, fatigue, fever and unexpected weight change.   HENT:  Negative for mouth sores and tinnitus.    Eyes:  Negative for discharge and visual disturbance.   Respiratory:  Negative for cough, shortness of breath and wheezing.    Cardiovascular:  Negative for chest pain, palpitations and leg swelling.   Gastrointestinal:  Negative for abdominal pain, bloating, blood in stool, constipation, diarrhea, nausea and vomiting.   Endocrine: Negative for diabetes, hair loss, hot flashes, hyperthyroidism and hypothyroidism.  "  Genitourinary:  Negative for dysuria, flank pain, frequency, genital sores, hematuria, urgency, vaginal bleeding, vaginal discharge, vaginal pain, postcoital bleeding and vaginal odor.   Musculoskeletal:  Negative for back pain, joint swelling and myalgias.   Integumentary:  Negative for rash, acne, breast mass, nipple discharge and breast skin changes.   Neurological:  Negative for seizures, syncope, numbness and headaches.   Hematological:  Negative for adenopathy. Does not bruise/bleed easily.   Psychiatric/Behavioral:  Negative for depression and sleep disturbance. The patient is not nervous/anxious.    Breast: Negative for mass, mastodynia, nipple discharge and skin changes      Objective:    Vital Signs:  Vitals:    10/11/23 1028   BP: 134/82   Pulse: 82   Resp: 18   SpO2: 98%   Weight: 58.9 kg (129 lb 13.6 oz)   Height: 5' 1" (1.549 m)         Physical Exam:  General:  alert, cooperative, appears stated age   Skin:  Skin color, texture, turgor normal. No rashes or lesions   HEENT:  conjunctivae/corneas clear. PERRL.   Neck: supple, trachea midline, no adenopathy or thyromegally   Respiratory:  clear to auscultation bilaterally   Heart:  regular rate and rhythm, S1, S2 normal, no murmur, click, rub or gallop   Breasts:  no discharge, erythema, or tenderness   Abdomen:  normal findings: bowel sounds normal, no masses palpable, no organomegaly and soft, non-tender   Pelvis: External genitalia: normal general appearance  Urinary system: urethral meatus normal, bladder nontender  Vaginal: atrophic mucosa  Cervix: absent, removed surgically  Uterus: absent, removed surgically  Adnexa: non palpable   Extremities: Normal ROM; no edema, no cyanosis   Neurologial: Normal strength and tone. No focal numbness or weakness. Reflexes 2+ and equal.   Psychiatric: normal mood, speech, dress, and thought processes         Assessment:       Healthy female exam.     1. Well woman exam with routine gynecological exam    2. " Breast cancer screening by mammogram    3. History of hysterectomy          Plan:       pap smear deferred per guidelines    MMG scheduled today  Discussed weight bearing exercise, calcium, and vitamin d for osteoporosis prevention  Discussed colonoscopy with patient; she is due and will contact Dr. Bello office to get scheduled.   Refill Premarin cream  RTC in 1 year    COUNSELING:  Kayla was counseled on STD pevention, use and side-effects of various contraceptive measures, A.C.O.G. Pap guidelines and recommendations for yearly pelvic exams in addition to recommendations for monthly self breast exams; to see her PCP for other health maintenance.

## 2024-05-16 ENCOUNTER — OFFICE VISIT (OUTPATIENT)
Dept: INTERNAL MEDICINE | Facility: CLINIC | Age: 59
End: 2024-05-16
Payer: COMMERCIAL

## 2024-05-16 VITALS
DIASTOLIC BLOOD PRESSURE: 82 MMHG | HEIGHT: 61 IN | BODY MASS INDEX: 25.14 KG/M2 | WEIGHT: 133.19 LBS | HEART RATE: 79 BPM | OXYGEN SATURATION: 95 % | RESPIRATION RATE: 18 BRPM | SYSTOLIC BLOOD PRESSURE: 126 MMHG

## 2024-05-16 DIAGNOSIS — K21.9 GASTROESOPHAGEAL REFLUX DISEASE WITHOUT ESOPHAGITIS: ICD-10-CM

## 2024-05-16 DIAGNOSIS — J45.40 MODERATE PERSISTENT ASTHMA WITHOUT COMPLICATION: ICD-10-CM

## 2024-05-16 DIAGNOSIS — E78.2 MIXED HYPERLIPIDEMIA: ICD-10-CM

## 2024-05-16 DIAGNOSIS — Z78.0 POST-MENOPAUSAL: ICD-10-CM

## 2024-05-16 DIAGNOSIS — R53.83 FATIGUE, UNSPECIFIED TYPE: ICD-10-CM

## 2024-05-16 DIAGNOSIS — Z13.1 SCREENING FOR DIABETES MELLITUS: ICD-10-CM

## 2024-05-16 PROCEDURE — 1159F MED LIST DOCD IN RCRD: CPT | Mod: CPTII,S$GLB,, | Performed by: NURSE PRACTITIONER

## 2024-05-16 PROCEDURE — 1160F RVW MEDS BY RX/DR IN RCRD: CPT | Mod: CPTII,S$GLB,, | Performed by: NURSE PRACTITIONER

## 2024-05-16 PROCEDURE — 99214 OFFICE O/P EST MOD 30 MIN: CPT | Mod: S$GLB,,, | Performed by: NURSE PRACTITIONER

## 2024-05-16 PROCEDURE — 3079F DIAST BP 80-89 MM HG: CPT | Mod: CPTII,S$GLB,, | Performed by: NURSE PRACTITIONER

## 2024-05-16 PROCEDURE — 3074F SYST BP LT 130 MM HG: CPT | Mod: CPTII,S$GLB,, | Performed by: NURSE PRACTITIONER

## 2024-05-16 PROCEDURE — 3044F HG A1C LEVEL LT 7.0%: CPT | Mod: CPTII,S$GLB,, | Performed by: NURSE PRACTITIONER

## 2024-05-16 PROCEDURE — 3008F BODY MASS INDEX DOCD: CPT | Mod: CPTII,S$GLB,, | Performed by: NURSE PRACTITIONER

## 2024-05-16 PROCEDURE — 99999 PR PBB SHADOW E&M-EST. PATIENT-LVL V: CPT | Mod: PBBFAC,,, | Performed by: NURSE PRACTITIONER

## 2024-05-16 RX ORDER — METHYLPREDNISOLONE 4 MG/1
TABLET ORAL
COMMUNITY
Start: 2024-05-15 | End: 2024-06-04

## 2024-05-16 RX ORDER — DIAZEPAM 2 MG/1
2 TABLET ORAL
COMMUNITY
Start: 2024-05-15 | End: 2024-06-06 | Stop reason: SDUPTHER

## 2024-05-17 ENCOUNTER — LAB VISIT (OUTPATIENT)
Dept: LAB | Facility: HOSPITAL | Age: 59
End: 2024-05-17
Attending: NURSE PRACTITIONER
Payer: COMMERCIAL

## 2024-05-17 DIAGNOSIS — J45.40 MODERATE PERSISTENT ASTHMA WITHOUT COMPLICATION: ICD-10-CM

## 2024-05-17 DIAGNOSIS — Z78.0 POST-MENOPAUSAL: ICD-10-CM

## 2024-05-17 DIAGNOSIS — K21.9 GASTROESOPHAGEAL REFLUX DISEASE WITHOUT ESOPHAGITIS: ICD-10-CM

## 2024-05-17 DIAGNOSIS — Z13.1 SCREENING FOR DIABETES MELLITUS: ICD-10-CM

## 2024-05-17 DIAGNOSIS — R53.83 FATIGUE, UNSPECIFIED TYPE: ICD-10-CM

## 2024-05-17 LAB
ALBUMIN SERPL BCP-MCNC: 4.3 G/DL (ref 3.5–5.2)
ALP SERPL-CCNC: 82 U/L (ref 55–135)
ALT SERPL W/O P-5'-P-CCNC: 41 U/L (ref 10–44)
ANION GAP SERPL CALC-SCNC: 11 MMOL/L (ref 8–16)
AST SERPL-CCNC: 24 U/L (ref 10–40)
BASOPHILS # BLD AUTO: 0.02 K/UL (ref 0–0.2)
BASOPHILS NFR BLD: 0.2 % (ref 0–1.9)
BILIRUB SERPL-MCNC: 0.4 MG/DL (ref 0.1–1)
BUN SERPL-MCNC: 14 MG/DL (ref 8–23)
CALCIUM SERPL-MCNC: 10 MG/DL (ref 8.7–10.5)
CHLORIDE SERPL-SCNC: 104 MMOL/L (ref 95–110)
CHOLEST SERPL-MCNC: 176 MG/DL (ref 120–199)
CHOLEST/HDLC SERPL: 3.6 {RATIO} (ref 2–5)
CO2 SERPL-SCNC: 27 MMOL/L (ref 23–29)
CREAT SERPL-MCNC: 0.8 MG/DL (ref 0.5–1.4)
DIFFERENTIAL METHOD BLD: ABNORMAL
EOSINOPHIL # BLD AUTO: 0 K/UL (ref 0–0.5)
EOSINOPHIL NFR BLD: 0 % (ref 0–8)
ERYTHROCYTE [DISTWIDTH] IN BLOOD BY AUTOMATED COUNT: 12.9 % (ref 11.5–14.5)
EST. GFR  (NO RACE VARIABLE): >60 ML/MIN/1.73 M^2
ESTIMATED AVG GLUCOSE: 123 MG/DL (ref 68–131)
GLUCOSE SERPL-MCNC: 100 MG/DL (ref 70–110)
HBA1C MFR BLD: 5.9 % (ref 4–5.6)
HCT VFR BLD AUTO: 40.8 % (ref 37–48.5)
HDLC SERPL-MCNC: 49 MG/DL (ref 40–75)
HDLC SERPL: 27.8 % (ref 20–50)
HGB BLD-MCNC: 13.7 G/DL (ref 12–16)
IMM GRANULOCYTES # BLD AUTO: 0.04 K/UL (ref 0–0.04)
IMM GRANULOCYTES NFR BLD AUTO: 0.5 % (ref 0–0.5)
LDLC SERPL CALC-MCNC: 108.4 MG/DL (ref 63–159)
LYMPHOCYTES # BLD AUTO: 1.1 K/UL (ref 1–4.8)
LYMPHOCYTES NFR BLD: 12.4 % (ref 18–48)
MCH RBC QN AUTO: 29.1 PG (ref 27–31)
MCHC RBC AUTO-ENTMCNC: 33.6 G/DL (ref 32–36)
MCV RBC AUTO: 87 FL (ref 82–98)
MONOCYTES # BLD AUTO: 0.5 K/UL (ref 0.3–1)
MONOCYTES NFR BLD: 5.9 % (ref 4–15)
NEUTROPHILS # BLD AUTO: 7.2 K/UL (ref 1.8–7.7)
NEUTROPHILS NFR BLD: 81 % (ref 38–73)
NONHDLC SERPL-MCNC: 127 MG/DL
NRBC BLD-RTO: 0 /100 WBC
PLATELET # BLD AUTO: 221 K/UL (ref 150–450)
PMV BLD AUTO: 10.1 FL (ref 9.2–12.9)
POTASSIUM SERPL-SCNC: 4.2 MMOL/L (ref 3.5–5.1)
PROT SERPL-MCNC: 7.7 G/DL (ref 6–8.4)
RBC # BLD AUTO: 4.71 M/UL (ref 4–5.4)
SODIUM SERPL-SCNC: 142 MMOL/L (ref 136–145)
TRIGL SERPL-MCNC: 93 MG/DL (ref 30–150)
TSH SERPL DL<=0.005 MIU/L-ACNC: 0.53 UIU/ML (ref 0.4–4)
WBC # BLD AUTO: 8.82 K/UL (ref 3.9–12.7)

## 2024-05-17 PROCEDURE — 36415 COLL VENOUS BLD VENIPUNCTURE: CPT | Performed by: NURSE PRACTITIONER

## 2024-05-17 PROCEDURE — 80061 LIPID PANEL: CPT | Performed by: NURSE PRACTITIONER

## 2024-05-17 PROCEDURE — 80053 COMPREHEN METABOLIC PANEL: CPT | Performed by: NURSE PRACTITIONER

## 2024-05-17 PROCEDURE — 85025 COMPLETE CBC W/AUTO DIFF WBC: CPT | Performed by: NURSE PRACTITIONER

## 2024-05-17 PROCEDURE — 84443 ASSAY THYROID STIM HORMONE: CPT | Performed by: NURSE PRACTITIONER

## 2024-05-17 PROCEDURE — 83036 HEMOGLOBIN GLYCOSYLATED A1C: CPT | Performed by: NURSE PRACTITIONER

## 2024-05-21 ENCOUNTER — PATIENT MESSAGE (OUTPATIENT)
Dept: INTERNAL MEDICINE | Facility: CLINIC | Age: 59
End: 2024-05-21
Payer: COMMERCIAL

## 2024-05-24 NOTE — PROGRESS NOTES
Subjective:       Patient ID: Kayla Gray is a 59 y.o. female.    Chief Complaint: Follow-up (Check up- fatigue )    Patient is known, to me and presents with   Chief Complaint   Patient presents with    Follow-up     Check up- fatigue    .  Denies chest pain and shortness of breath.  Patient presents with some fatigue and is requesting to have blood work. She states that diabetes does run in her family and wants to make sure that is not what it is. She denies any polyuria, polydipsia, or polyphagia. Her asthma is well controlled. States that she is not depressed or anxious.   Follow-up  Associated symptoms include fatigue. Pertinent negatives include no arthralgias, chest pain, congestion, coughing, fever, headaches, joint swelling, neck pain, numbness or weakness.     Review of Systems   Constitutional:  Positive for fatigue. Negative for activity change, appetite change, fever and unexpected weight change.   HENT:  Negative for congestion, ear discharge, ear pain, hearing loss, postnasal drip and tinnitus.    Eyes:  Negative for photophobia, pain and visual disturbance.   Respiratory:  Negative for cough, shortness of breath, wheezing and stridor.    Cardiovascular:  Negative for chest pain, palpitations and leg swelling.   Gastrointestinal:  Negative for abdominal distention.   Genitourinary:  Negative for difficulty urinating, dysuria, frequency, hematuria and urgency.   Musculoskeletal:  Negative for arthralgias, back pain, gait problem, joint swelling and neck pain.   Skin: Negative.    Neurological:  Negative for dizziness, seizures, syncope, weakness, light-headedness, numbness and headaches.   Hematological:  Negative for adenopathy. Does not bruise/bleed easily.   Psychiatric/Behavioral:  Negative for behavioral problems, confusion, dysphoric mood, hallucinations, sleep disturbance and suicidal ideas. The patient is not nervous/anxious.        Objective:      Physical Exam  Constitutional:        General: She is not in acute distress.     Appearance: She is well-developed.   HENT:      Head: Normocephalic and atraumatic.      Right Ear: Tympanic membrane and external ear normal.      Left Ear: Tympanic membrane and external ear normal.      Nose: Nose normal.      Mouth/Throat:      Mouth: Mucous membranes are moist.      Pharynx: No oropharyngeal exudate.   Eyes:      General: No scleral icterus.        Right eye: No discharge.         Left eye: No discharge.      Conjunctiva/sclera: Conjunctivae normal.      Pupils: Pupils are equal, round, and reactive to light.   Neck:      Thyroid: No thyromegaly.      Vascular: No JVD.   Cardiovascular:      Rate and Rhythm: Normal rate and regular rhythm.      Heart sounds: Normal heart sounds. No murmur heard.     No friction rub. No gallop.   Pulmonary:      Effort: Pulmonary effort is normal. No respiratory distress.      Breath sounds: Normal breath sounds. No stridor. No wheezing or rales.   Chest:      Chest wall: No tenderness.   Abdominal:      General: Bowel sounds are normal. There is no distension.      Palpations: Abdomen is soft. There is no mass.      Tenderness: There is no abdominal tenderness. There is no right CVA tenderness, left CVA tenderness, guarding or rebound.      Hernia: No hernia is present.   Musculoskeletal:         General: No swelling, tenderness, deformity or signs of injury. Normal range of motion.      Cervical back: Normal range of motion and neck supple.      Right lower leg: No edema.      Left lower leg: No edema.   Lymphadenopathy:      Cervical: No cervical adenopathy.   Skin:     General: Skin is warm and dry.      Capillary Refill: Capillary refill takes less than 2 seconds.      Coloration: Skin is not jaundiced or pale.      Findings: No bruising, erythema, lesion or rash.   Neurological:      General: No focal deficit present.      Mental Status: She is alert and oriented to person, place, and time.      Cranial  Nerves: No cranial nerve deficit.      Sensory: No sensory deficit.      Motor: No weakness or abnormal muscle tone.      Coordination: Coordination normal.      Gait: Gait normal.      Deep Tendon Reflexes: Reflexes are normal and symmetric. Reflexes normal.   Psychiatric:         Attention and Perception: She does not perceive auditory or visual hallucinations.         Mood and Affect: Mood and affect normal. Mood is not anxious or depressed. Affect is not tearful.         Behavior: Behavior normal.         Thought Content: Thought content normal. Thought content does not include homicidal or suicidal ideation. Thought content does not include homicidal or suicidal plan.         Judgment: Judgment normal.         Assessment:       1. Fatigue, unspecified type    2. Gastroesophageal reflux disease without esophagitis    3. Moderate persistent asthma without complication    4. Mixed hyperlipidemia    5. Post-menopausal    6. Screening for diabetes mellitus        Plan:   1. Fatigue, unspecified type  -     CBC Auto Differential; Future; Expected date: 05/20/2024  -     Comprehensive Metabolic Panel; Future; Expected date: 05/20/2024    2. Gastroesophageal reflux disease without esophagitis  -     CBC Auto Differential; Future; Expected date: 05/20/2024  -     Comprehensive Metabolic Panel; Future; Expected date: 05/20/2024  -     Ambulatory referral/consult to Gastroenterology; Future; Expected date: 05/23/2024    3. Moderate persistent asthma without complication  -     CBC Auto Differential; Future; Expected date: 05/20/2024  -     Comprehensive Metabolic Panel; Future; Expected date: 05/20/2024    4. Mixed hyperlipidemia    5. Post-menopausal  -     CBC Auto Differential; Future; Expected date: 05/20/2024  -     Comprehensive Metabolic Panel; Future; Expected date: 05/20/2024  -     TSH; Future; Expected date: 05/20/2024  -     Lipid Panel; Future; Expected date: 05/20/2024  -     DXA Bone Density Axial Skeleton  "1 or more sites; Future; Expected date: 05/16/2024    6. Screening for diabetes mellitus  -     Hemoglobin A1C; Future; Expected date: 05/20/2024       "This note will not be shared with the patient."  Will notify of results   Lab drawn today CBC, CMP, TSH, FLP  Limit the cholesterol in your diet to less than 300 mg per day.  Fats should contribute no more than 20 to 35% of your daily calories.  Less than 7 to 10% of your calories should come from saturated fat.  Avoid saturated fat products e.g., butter, some oils, meat, and poultry fat contain a lot of saturated fat.  Check food labels for fat and cholesterol content. Choose the foods with less fat per serving.  Limit the amount of butter and margarine you eat.  Use salad dressings and margarine made with polyunsaturated and monunsaturated fats.  Use egg whites or egg substitutes rather than whole eggs.  Replace whole-milk dairy products with nonfat or low-fat mild, cheese, spreads, and yogurt.  Eat skinless chicken, turkey, fish, and meatless entrees more often than red meat.  Choose lean cuts of meat and trim off all visible fat. Keep portion sizes moderate.  Avoid fatty desserts such as ice cream, cream-filled cakes, and cheesecakes. Choose fresh fruits, nonfat frozen yogurt, Popsicles, etc.  Reduce the amount of fried foods, vending machine food, and fast food you eat.  Eat fruits and vegetables (especially fresh fruits and leafy vegetables), beans, and whole grains daily. The fiber in these foods helps lower cholesterol.  Look for low-fat or nonfat varieties of the foods you like to eat or look for substitutes.  You may need to exercise 60 minutes a day to prevent weight gain and 90 minutes a day to lose weight.  RTC in six months.   "

## 2024-05-31 ENCOUNTER — TELEPHONE (OUTPATIENT)
Dept: INTERNAL MEDICINE | Facility: CLINIC | Age: 59
End: 2024-05-31
Payer: COMMERCIAL

## 2024-05-31 DIAGNOSIS — M25.551 RIGHT HIP PAIN: Primary | ICD-10-CM

## 2024-05-31 RX ORDER — CYCLOBENZAPRINE HCL 5 MG
5 TABLET ORAL NIGHTLY PRN
Qty: 30 TABLET | Refills: 2 | Status: SHIPPED | OUTPATIENT
Start: 2024-05-31

## 2024-05-31 NOTE — TELEPHONE ENCOUNTER
----- Message from Cathy Henao MA sent at 2024  8:38 AM CDT -----  Regarding: FW: HIP PAIN  Contact: Kayla    ----- Message -----  From: Abhishek Tolentino MA  Sent: 2024   8:28 AM CDT  To: [facility] Staff  Subject: HIP PAIN                                         Kayla Gray  MRN: [id number]  : 1965  PCP: Shell Mckee.  Home Phone      [phone]  Work Phone      Not on file.  Mobile          [phone]      MESSAGE: pt called requesting the pain meds she refused at [facility]. Pt states she was seen for hip pain. Pt would like meds sent to [facility].  Please advise    [phone]

## 2024-06-04 ENCOUNTER — HOSPITAL ENCOUNTER (OUTPATIENT)
Dept: RADIOLOGY | Facility: HOSPITAL | Age: 59
Discharge: HOME | End: 2024-06-04
Attending: NURSE PRACTITIONER
Payer: COMMERCIAL

## 2024-06-04 ENCOUNTER — OFFICE VISIT (OUTPATIENT)
Dept: INTERNAL MEDICINE | Facility: CLINIC | Age: 59
End: 2024-06-04
Payer: COMMERCIAL

## 2024-06-04 VITALS
SYSTOLIC BLOOD PRESSURE: 130 MMHG | BODY MASS INDEX: 24.55 KG/M2 | DIASTOLIC BLOOD PRESSURE: 80 MMHG | HEART RATE: 79 BPM | WEIGHT: 130 LBS | OXYGEN SATURATION: 95 % | HEIGHT: 61 IN | RESPIRATION RATE: 18 BRPM

## 2024-06-04 DIAGNOSIS — M54.16 RIGHT LUMBAR RADICULOPATHY: Primary | ICD-10-CM

## 2024-06-04 DIAGNOSIS — M54.16 RIGHT LUMBAR RADICULOPATHY: ICD-10-CM

## 2024-06-04 PROCEDURE — 3008F BODY MASS INDEX DOCD: CPT | Mod: CPTII,S$GLB,, | Performed by: NURSE PRACTITIONER

## 2024-06-04 PROCEDURE — 72110 X-RAY EXAM L-2 SPINE 4/>VWS: CPT | Mod: TC

## 2024-06-04 PROCEDURE — 3079F DIAST BP 80-89 MM HG: CPT | Mod: CPTII,S$GLB,, | Performed by: NURSE PRACTITIONER

## 2024-06-04 PROCEDURE — 3075F SYST BP GE 130 - 139MM HG: CPT | Mod: CPTII,S$GLB,, | Performed by: NURSE PRACTITIONER

## 2024-06-04 PROCEDURE — 99999 PR PBB SHADOW E&M-EST. PATIENT-LVL V: CPT | Mod: PBBFAC,,, | Performed by: NURSE PRACTITIONER

## 2024-06-04 PROCEDURE — 3044F HG A1C LEVEL LT 7.0%: CPT | Mod: CPTII,S$GLB,, | Performed by: NURSE PRACTITIONER

## 2024-06-04 PROCEDURE — 72110 X-RAY EXAM L-2 SPINE 4/>VWS: CPT | Mod: 26,,, | Performed by: RADIOLOGY

## 2024-06-04 PROCEDURE — 99213 OFFICE O/P EST LOW 20 MIN: CPT | Mod: S$GLB,,, | Performed by: NURSE PRACTITIONER

## 2024-06-04 RX ORDER — PREGABALIN 25 MG/1
25 CAPSULE ORAL 2 TIMES DAILY
Qty: 60 CAPSULE | Refills: 2 | Status: SHIPPED | OUTPATIENT
Start: 2024-06-04 | End: 2024-06-04 | Stop reason: SDUPTHER

## 2024-06-05 RX ORDER — PREGABALIN 25 MG/1
25 CAPSULE ORAL 2 TIMES DAILY
Qty: 60 CAPSULE | Refills: 2 | Status: SHIPPED | OUTPATIENT
Start: 2024-06-05 | End: 2024-12-04

## 2024-06-06 ENCOUNTER — OFFICE VISIT (OUTPATIENT)
Dept: PAIN MEDICINE | Facility: CLINIC | Age: 59
End: 2024-06-06
Payer: COMMERCIAL

## 2024-06-06 ENCOUNTER — HOSPITAL ENCOUNTER (OUTPATIENT)
Dept: RADIOLOGY | Facility: HOSPITAL | Age: 59
Discharge: HOME | End: 2024-06-06
Attending: ANESTHESIOLOGY
Payer: COMMERCIAL

## 2024-06-06 ENCOUNTER — TELEPHONE (OUTPATIENT)
Dept: PAIN MEDICINE | Facility: CLINIC | Age: 59
End: 2024-06-06

## 2024-06-06 DIAGNOSIS — M25.551 RIGHT HIP PAIN: ICD-10-CM

## 2024-06-06 DIAGNOSIS — M54.16 RIGHT LUMBAR RADICULOPATHY: ICD-10-CM

## 2024-06-06 DIAGNOSIS — M54.16 LUMBAR RADICULITIS: ICD-10-CM

## 2024-06-06 DIAGNOSIS — M54.16 LUMBAR RADICULOPATHY: Primary | ICD-10-CM

## 2024-06-06 DIAGNOSIS — M47.816 LUMBAR FACET ARTHROPATHY: ICD-10-CM

## 2024-06-06 PROCEDURE — 73521 X-RAY EXAM HIPS BI 2 VIEWS: CPT | Mod: TC

## 2024-06-06 PROCEDURE — 99999 PR PBB SHADOW E&M-EST. PATIENT-LVL IV: CPT | Mod: PBBFAC,,, | Performed by: ANESTHESIOLOGY

## 2024-06-06 PROCEDURE — 99204 OFFICE O/P NEW MOD 45 MIN: CPT | Mod: S$GLB,,, | Performed by: ANESTHESIOLOGY

## 2024-06-06 PROCEDURE — G2211 COMPLEX E/M VISIT ADD ON: HCPCS | Mod: S$GLB,,, | Performed by: ANESTHESIOLOGY

## 2024-06-06 PROCEDURE — 73521 X-RAY EXAM HIPS BI 2 VIEWS: CPT | Mod: 26,,, | Performed by: RADIOLOGY

## 2024-06-06 PROCEDURE — 3044F HG A1C LEVEL LT 7.0%: CPT | Mod: CPTII,S$GLB,, | Performed by: ANESTHESIOLOGY

## 2024-06-06 PROCEDURE — 1160F RVW MEDS BY RX/DR IN RCRD: CPT | Mod: CPTII,S$GLB,, | Performed by: ANESTHESIOLOGY

## 2024-06-06 PROCEDURE — 1159F MED LIST DOCD IN RCRD: CPT | Mod: CPTII,S$GLB,, | Performed by: ANESTHESIOLOGY

## 2024-06-06 RX ORDER — ALPRAZOLAM 1 MG/1
2 TABLET ORAL ONCE
Qty: 2 TABLET | Refills: 0 | Status: SHIPPED | OUTPATIENT
Start: 2024-06-06 | End: 2024-06-06

## 2024-06-06 NOTE — PROGRESS NOTES
New patient evaluation  [facility]    Kayla Gray  : 1965  Date: 2024     CHIEF COMPLAINT:  Leg Pain    Referring Physician: Shell Mckee NP  Primary Care Physician: Shell Mckee NP    HPI:  This is a 59 y.o. female with a chief complaint of Leg Pain  . The patient has Past medical history/Past surgical history of asthma, hyperlipidemia, Long term blood thinner, avascular necrosis of the right hip    Patient was evaluated and referred by primary care provider for low back pain  X-ray lumbar spine obtained in 2024 showed no abnormal flexion-extension movement with a disc space narrowing at L5-S1 and facet arthropathy.    No previous MRI lumbar spine  She was started on Lyrica 25 mg b.i.d. in 2024 in addition to Flexeril as needed.  She was seen by orthopedic surgeon 2024 who rule out her right hip pathology causing her symptoms by performing right hip MRI.  Low back pain started in December, she started to see a chiropractor in February and this is when all the hip pain started. Numbness to lower extremity/foot started about 3 weeks ago.     Diabetic: No    Anticogualtion drugs: Plavix     Allergy To Iodine: No    Currently on Antibiotic: No    Current Description of Pain Symptoms:    History of Recent Fall or Trauma: No   Onset: Chronic, started 2023, right hip pain started 6 weeks and leg and feet for 3 weeks  Pain Location:  Right buttock, right hip, no groin pain, no tenderness over the SI joint or GTB  Radiates/associated symptoms: down RLE to foot, no left LE  Pain is Getting worse over the last 2 months    The pain is described as aching, numbing, and stabbing(Right leg is numb from knee down to foot)  Exacerbating factors: Sitting, Laying, riding in car.  Mitigating factors standing and moving.   Symptoms interfere with daily activity, sleeping.   The patient feels like symptoms have been worsening.   on  physical exam she has 5/5 muscle power in bilateral lower extremities    Pain score:   Current: 5/10  Best: 3/10  Worst: 10/10    Current pain medications:    pregabalin (LYRICA) 25 MG capsule, b.i.d- has not started yet    Current Narcotics/Opioid /benzo Medications:  Opioids- None  Benzodiazepines: No    UDS:  NA    PDMP:  Reviewed and consistent with medication use as prescribed.     Previous Chronic Pain Treatment History:  Physical Therapy/HEP/Physician Lead Exercise Program:  [x]Chiropractor from 2024 to May 20, 2024, + 10 sessions- with no relief    Non-interventional Pain Therapy:  [x]Chiropractor from 2024 to May 20, 2024, + 10 sessions  []Acupuncture/Dry needle.  []TENS unit.  []Heat/ICE.  []Back Brace.    Medications previously tried:  NSAIDs: None Allergic to ASA, her cardiology place her on Plavix  Topical Agent: No  TCA/SSRI/SNRI: None  Anti-convulsants: Lyrica   Muscle Relaxants: Flexeril: tachycardia    Opioids- Oxycodone with Acetaminophen (Percocet).    Interventional Pain Procedures:  None     Previous spine/Relevant joint surgery:  none  Surgical History:   has a past surgical history that includes  section, classic; [place] tooth extraction (2017); Bilateral salpingoophorectomy (2017); Abdominal mass resection (2017); pr removal of ovary/tube(s); and Hysterectomy (2017).  Medical History:   has a past medical history of Asthma, Avascular necrosis of bone of right hip, Esophageal dilatation, GERD (gastroesophageal reflux disease), Hiatal hernia, and Yeast infection.  Family History:  family history includes Breast cancer in her paternal grandmother; Cancer in her maternal grandfather and paternal grandmother; Heart disease in her father and paternal grandmother.  Allergies:  Asa [aspirin] and Codeine   Social History/SUBSTANCE ABUSE HISTORY:  Personal history of substance abuse: No   reports that she has never smoked. She has never used smokeless  "tobacco. She reports that she does not drink alcohol and does not use drugs.  LABS:  CBC  Lab Results   Component Value Date    WBC 8.82 05/17/2024    HGB 13.7 05/17/2024    HCT 40.8 05/17/2024     Coagulation Profile   Lab Results   Component Value Date     05/17/2024     No results found for: "PT", "PTT", "INR"  CMP:  BMP  Lab Results   Component Value Date     05/17/2024    K 4.2 05/17/2024     05/17/2024    CO2 27 05/17/2024    BUN 14 05/17/2024    CREATININE 0.8 05/17/2024    CALCIUM 10.0 05/17/2024    ANIONGAP 11 05/17/2024    EGFRNORACEVR >60 05/17/2024     Lab Results   Component Value Date    ALT 41 05/17/2024    AST 24 05/17/2024    ALKPHOS 82 05/17/2024    BILITOT 0.4 05/17/2024     HGBA1C:  Lab Results   Component Value Date    HGBA1C 5.9 (H) 05/17/2024       ROS:    Review of Systems   GENERAL:  No weight loss, malaise or fevers.  HEENT:   No recent changes in vision or hearing  NECK:  Negative for lumps, no difficulty with swallowing.  RESPIRATORY:  Negative for cough, wheezing or shortness of breath, patient denies any recent URI.  CARDIOVASCULAR:  Negative for chest pain or palpitations.  GI:  Negative for abdominal discomfort, blood in stools or black stools or change in bowel habits.  MUSCULOSKELETAL:  See HPI.  SKIN:  Negative for lesions, rash, and itching.  PSYCH:  No mood disorder or recent psychosocial stressors.   HEMATOLOGY/LYMPHOLOGY:  See the blood thinner sectioned in HPI.  NEURO:  See HPI  All other reviewed and negative other than HPI.    PHYSICAL EXAM:  VITALS: LMP 03/24/2017   There is no height or weight on file to calculate BMI.  GENERAL: Well appearing, in no acute distress, alert and oriented x3, answers questions appropriately.   PSYCH: Flat affect.  SKIN: Skin color, texture, turgor normal, no rashes or lesions.  HEAD/FACE:  Normocephalic, atraumatic. Cranial nerves grossly intact.  CV: Regular rate  PULM: No evidence of respiratory difficulty, symmetric chest " rise.  GI:  Soft and non-Distended.  BACK/SIJ/HIP:  Lumbar Spine Exam:       Inspection: No erythema, bruising.       Palpation: (-) TTP of lumbar paraspinals bilaterally      ROM:  Limited in flexion, extension, lateral bending.       (-) Facet loading bilaterally      (+) Straight Leg Raise, right lower extremity++, very significant      (-) negative tenderness for right posterior superior iliac spine.  Hip Exam:      Inspection: No gross deformity or apparent leg length discrepancy      Palpation:  No TTP to bilateral greater trochanteric bursas.       ROM:  Positive limitation due to pain on external rotation of the right hip in compared to the left  Neurologic Exam:     Alert. Speech is fluent and appropriate.     Strength: 5/5 in bilateral hip flexion and knee extension     Sensation:  Grossly intact to light touch in bilateral lower extremities     Tone: No abnormality appreciated in bilateral lower extremities    GAIT:  Antalgic gait, unsteady gait, using a crutch due to pain    DIAGNOSTIC STUDIES AND MEDICAL RECORDS REVIEW:  XR LUMBAR SPINE AP AND LAT WITH FLEX/EXT    Alignment: Mild dextroscoliotic curvature.  No abnormal movement with flexion or extension to suggest ligamentous instability.     Vertebrae: Vertebral body heights are maintained.  No suspicious appearing lytic or blastic lesions.     Discs and facets: Disc space narrowing at L5-S1. Facet arthropathy at L5-S1.  ASSESSMENT:  Kayla Gray is a 59 y.o. female with the following diagnoses based on history, exam, and imagin. Lumbar radiculopathy  - MRI Lumbar Spine Without Contrast; Future  - MRI Lumbar Spine Without Contrast  - Ambulatory referral/consult to Physical/Occupational Therapy; Future    2. Right lumbar radiculopathy  - Ambulatory referral/consult to Pain Clinic    3. Right hip pain  - X-Ray Hips Bilateral 2 [employer] AP Pelvis; Future    4. Lumbar radiculitis    5. Lumbar facet arthropathy    ----------------------------------------------------------  This is a pleasant 59 y.o. female patient with PMH/PSH of asthma, hyperlipidemia, long-term anticoagulation on Plavix presenting with right hip pain and right lower extremity numbness that has been worsening since December 20, 2023, she has completed multiple sessions of chiropractor sessions exercise between February 20, 2024 and April 20, 2024 with no significant relief, she has remote history of avascular necrosis of right hip and when she was seen by orthopedic provider Dr hubbard, order MRI right hip(not on file), recommended to check her lumbar spine, no prior MRI lumbar spine, given her significant radiation symptoms & worsening of pain, I would recommend obtaining external MRI lumbar spine with Xanax, starting physical therapy, bilateral x-ray of the hip.    Treatment Plan:    Diagnostics/Referrals: MRI lumbar spine+ bilateral x-ray hip    Medications:    NSAIDs:  Can not have due to blood thinners  Topical Agent: No  TCA/SSRI/SNRI: None  Anti-convulsants: Lyrica - per PCP  Muscle Relaxants: None  Opioids: None    Interventional Therapy: Procedure based on MRI images.  Sedation: NA.  Clearance to stop Blood thinner: Anticogualtion drugs: None    Regarding the above interventions, the patient has been educated regarding the risks (including bleeding, infection, increased pain, nerve damage, or allergic reaction), benefits, and alternatives. The patient states she understands and is eager to proceed.    Physical Rehabilitation:  Start physical therapy    Patient Education: Counseled patient regarding the importance of activity modification, I have stressed the importance of physical activity and a home exercise plan to help with pain and improve health.    Follow-up: RTC 2 weeks to discuss MRI and office.    May consider:  Right L4 and L5 transforaminal epidural steroid injection based on her symptoms distribution    I would like to thank aRfi  Shell DOS SANTOS NP for the opportunity to assist in the care of this patient. We had a very nice visit and I look forward to continuing their care. Please let me know if I can be of further assistance.     Monique Josue MD  Anesthesiologist  Interventional Pain Medicine  06/06/2024    Disclaimer:  This note was prepared using voice recognition system and is likely to have sound alike errors that may have been overlooked even after proof reading.  Please call me with any questions.

## 2024-06-06 NOTE — PROGRESS NOTES
New patient evaluation  [facility]    Kayla Gray  : 1965  Date: 2024     CHIEF COMPLAINT:  No chief complaint on file.    Referring Physician: Shell Mckee NP  Primary Care Physician: Shell Mckee NP    HPI:  This is a 59 y.o. female with a chief complaint of No chief complaint on file.  . The patient has Past medical history/Past surgical history of asthma, hyperlipidemia    Patient was evaluated and referred by primary care provider for low back pain  X-ray lumbar spine obtained in 2024 showed no abnormal flexion-extension movement with a disc space narrowing at L5-S1 and facet arthropathy.    No previous MRI lumbar spine  She was started on Lyrica 25 mg b.i.d. in 2024 in addition to Flexeril as needed    Diabetic: {GAYes/No/NA:35223}    {Anticogualtion drugs:79185}    Allergy To Iodine: {GAYes/No/NA:69180}    Currently on Antibiotic: {GAYes/No/NA:16174}    Current Description of Pain Symptoms:    History of Recent Fall or Trauma: {GAYes/No/NA:50213}   Onset: Chronic, started ***  Pain Location: ***  Radiates/associated symptoms: ***.   Pain is Getting worse over the last *** months    The pain is described as {Desc; pain character:06578}.   Exacerbating factors: {Causes; Pain:52847}.   Mitigating factors ***.   Symptoms interfere with daily activity, sleeping, and ***.   The patient feels like symptoms have been {IUW:56603}.   Patient {Denies / Reports:49021} {RED FLAGS:22935}.    Pain score:   Current: {PAIN 0-10:35343}/10  Best: {PAIN 0-10:03949}/10  Worst: {PAIN 0-10:11745}/10    Current pain medications:      clopidogreL (PLAVIX) 75 mg tablet,     cyclobenzaprine (FLEXERIL) 5 MG tablet, p.r.n.    pregabalin (LYRICA) 25 MG capsule, b.i.d.    Current Narcotics/Opioid /benzo Medications:  Opioids- None  Benzodiazepines: No    UDS:  NA    PDMP:  Reviewed and consistent with medication use as prescribed.     Previous Chronic  Pain Treatment History:  Physical Therapy/HEP/Physician Lead Exercise Program:  Over the past 12 months, Patient has done  *** sessions.  PT response: {PT response:53177} Helpful.   Dates of the PT sessions: ***, ***  Is patient participating in home exercise program (HEP): {GAYes/No/NA:52356}.    Non-interventional Pain Therapy:  [x]Chiropractor from 2024 to May 20, 2024, + 5 sessions  []Acupuncture/Dry needle.  []TENS unit.  []Heat/ICE.  []Back Brace.    Medications previously tried:  NSAIDs: {GANSIAD:94742}  Topical Agent: {GAYes/No/NA:42803}  TCA/SSRI/SNRI: {GATCA/SSRI/SNRI:54563}  Anti-convulsants: {GAAnticonvulsants:37290}  Muscle Relaxants: {GAmuscle Relaxant:22780}  Opioids- {GAopioid:40938}.    Interventional Pain Procedures:  ***    Previous spine/Relevant joint surgery:  ***  Surgical History:   has a past surgical history that includes  section, classic; [place] tooth extraction (2017); Bilateral salpingoophorectomy (2017); Abdominal mass resection (2017); pr removal of ovary/tube(s); and Hysterectomy (2017).  Medical History:   has a past medical history of Asthma, Avascular necrosis of bone of right hip, Esophageal dilatation, GERD (gastroesophageal reflux disease), Hiatal hernia, and Yeast infection.  Family History:  family history includes Breast cancer in her paternal grandmother; Cancer in her maternal grandfather and paternal grandmother; Heart disease in her father and paternal grandmother.  Allergies:  Asa [aspirin] and Codeine   Social History/SUBSTANCE ABUSE HISTORY:  Personal history of substance abuse: No   reports that she has never smoked. She has never used smokeless tobacco. She reports that she does not drink alcohol and does not use drugs.  LABS:  CBC  Lab Results   Component Value Date    WBC 8.82 2024    HGB 13.7 2024    HCT 40.8 2024     Coagulation Profile   Lab Results   Component Value Date     2024     No  "results found for: "PT", "PTT", "INR"  CMP:  BMP  Lab Results   Component Value Date     05/17/2024    K 4.2 05/17/2024     05/17/2024    CO2 27 05/17/2024    BUN 14 05/17/2024    CREATININE 0.8 05/17/2024    CALCIUM 10.0 05/17/2024    ANIONGAP 11 05/17/2024    EGFRNORACEVR >60 05/17/2024     Lab Results   Component Value Date    ALT 41 05/17/2024    AST 24 05/17/2024    ALKPHOS 82 05/17/2024    BILITOT 0.4 05/17/2024     HGBA1C:  Lab Results   Component Value Date    HGBA1C 5.9 (H) 05/17/2024       ROS:    Review of Systems   GENERAL:  No weight loss, malaise or fevers.  HEENT:   No recent changes in vision or hearing  NECK:  Negative for lumps, no difficulty with swallowing.  RESPIRATORY:  Negative for cough, wheezing or shortness of breath, patient denies any recent URI.  CARDIOVASCULAR:  Negative for chest pain or palpitations.  GI:  Negative for abdominal discomfort, blood in stools or black stools or change in bowel habits.  MUSCULOSKELETAL:  See HPI.  SKIN:  Negative for lesions, rash, and itching.  PSYCH:  No mood disorder or recent psychosocial stressors.   HEMATOLOGY/LYMPHOLOGY:  See the blood thinner sectioned in HPI.  NEURO:  See HPI  All other reviewed and negative other than HPI.    PHYSICAL EXAM:  VITALS: [facility] 03/24/2017   There is no height or weight on file to calculate BMI.  GENERAL: Well appearing, in no acute distress, alert and oriented x3, answers questions appropriately.   PSYCH: Flat affect.  SKIN: Skin color, texture, turgor normal, no rashes or lesions.  HEAD/FACE:  Normocephalic, atraumatic. Cranial nerves grossly intact.  CV: Regular rate  PULM: No evidence of respiratory difficulty, symmetric chest rise.  GI:  Soft and non-Distended.  BACK/SIJ/HIP:  Lumbar Spine Exam:       Inspection: No erythema, bruising.       Palpation: (***) TTP of lumbar paraspinals bilaterally      ROM:  Limited in flexion, extension, lateral bending.       (***) Facet loading bilaterally      (***) " Straight Leg Raise bilaterally      (***) CHIOMA bilaterally, Tenderness over the PSIS, Yeoman test  Hip Exam:      Inspection: No gross deformity or apparent leg length discrepancy      Palpation:  No TTP to bilateral greater trochanteric bursas.       ROM:  No limitation or pain in internal rotation, external rotation b/l  Neurologic Exam:     Alert. Speech is fluent and appropriate.     Strength: ***/5 in *** hip flexion and knee extension     Sensation:  Grossly intact to light touch in bilateral lower extremities     Tone: No abnormality appreciated in bilateral lower extremities    GAIT: ***    DIAGNOSTIC STUDIES AND MEDICAL RECORDS REVIEW:  XR LUMBAR SPINE AP AND LAT WITH FLEX/EXT       Alignment: Mild dextroscoliotic curvature.  No abnormal movement with flexion or extension to suggest ligamentous instability.     Vertebrae: Vertebral body heights are maintained.  No suspicious appearing lytic or blastic lesions.     Discs and facets: Disc space narrowing at L5-S1. Facet arthropathy at L5-S1.  ASSESSMENT:  Kayla Gray is a 59 y.o. female with the following diagnoses based on history, exam, and imaging:  There are no diagnoses linked to this encounter.   ---------------------------------------------------------------------  This is a pleasant 59 y.o. female patient with PMH/PSH of asthma, hyperlipidemia, presenting with***  We discussed the underlying diagnoses and multiple treatment options including non-opioid medications, interventional procedures, physical therapy, home exercise, core muscle enhancement, and weight loss.  The risks and benefits of each treatment option were discussed and all questions were answered.      Treatment Plan:    Diagnostics/Referrals: MRI lumbar spine    Medications:    NSAIDs: {GANSIAD:34451}  Topical Agent: {GAYes/No/NA:10064}  TCA/SSRI/SNRI: {GATCA/SSRI/SNRI:06412}  Anti-convulsants: Lyrica   Muscle Relaxants: None  Opioids: None    Interventional Therapy:  Procedure based on MRI images.  Sedation: NA.  Clearance to stop Blood thinner: Anticogualtion drugs: None    Regarding the above interventions, the patient has been educated regarding the risks (including bleeding, infection, increased pain, nerve damage, or allergic reaction), benefits, and alternatives. The patient states she understands and is eager to proceed.    Physical Rehabilitation: Physician led exercise program and home exercise    Patient Education: Counseled patient regarding the importance of activity modification, I have stressed the importance of physical activity and a home exercise plan to help with pain and improve health.    Follow-up: RTC ***.    May consider:     I would like to thank Shell Mckee NP for the opportunity to assist in the care of this patient. We had a very nice visit and I look forward to continuing their care. Please let me know if I can be of further assistance.     Monique Josue MD  Anesthesiologist  Interventional Pain Medicine  06/06/2024    Disclaimer:  This note was prepared using voice recognition system and is likely to have sound alike errors that may have been overlooked even after proof reading.  Please call me with any questions.

## 2024-06-06 NOTE — TELEPHONE ENCOUNTER
PT orders faxed to [facility] to schedule with Ozzie per patient request. Orders faxed to [facility] in [place] as well.

## 2024-06-10 ENCOUNTER — TELEPHONE (OUTPATIENT)
Dept: PAIN MEDICINE | Facility: CLINIC | Age: 59
End: 2024-06-10
Payer: COMMERCIAL

## 2024-06-10 NOTE — TELEPHONE ENCOUNTER
----- Message from Yoselyn Lee sent at 6/10/2024  2:38 PM CDT -----  Contact: , Tiam Gray  MRN: [id number]  : 1965  PCP: Shell Mckee  Home Phone      [phone]  Work Phone      Not on file.  Mobile          [phone]      MESSAGE: [facility] in [facility] is requesting authorization to schedule the patient's appointment. Please return this call    PHONE; [phone] (facility)

## 2024-06-10 NOTE — PROGRESS NOTES
Subjective:       Patient ID: Kayla Gray is a 59 y.o. female.    Chief Complaint: Hip Pain (R. Side from hip down  into leg - x 2 months PS:9)    Patient is known, to me and presents with   Chief Complaint   Patient presents with    Hip Pain     R. Side from hip down  into leg - x 2 months PS:9   .  Denies chest pain and shortness of breath.  Patient with worsening right hip/ buttock pain. She would like to have imaging and also referral to pain management since ortho told her it is not her hip      HPI  Review of Systems   Constitutional: Negative.    Respiratory: Negative.     Cardiovascular: Negative.    Musculoskeletal:  Positive for arthralgias, back pain and gait problem. Negative for joint swelling, myalgias, neck pain and neck stiffness.   Skin: Negative.  Negative for rash.   Neurological:  Negative for weakness and numbness.       Objective:      Physical Exam  Constitutional:       General: She is not in acute distress.     Appearance: Normal appearance. She is not ill-appearing, toxic-appearing or diaphoretic.   Neck:      Vascular: No carotid bruit.   Cardiovascular:      Rate and Rhythm: Normal rate and regular rhythm.      Heart sounds: Normal heart sounds. No murmur heard.  Pulmonary:      Effort: Pulmonary effort is normal. No respiratory distress.      Breath sounds: Normal breath sounds. No stridor. No wheezing, rhonchi or rales.   Chest:      Chest wall: No tenderness.   Musculoskeletal:         General: Tenderness present. No swelling, deformity or signs of injury.      Cervical back: Normal range of motion and neck supple. No rigidity or tenderness.      Lumbar back: Tenderness present. Decreased range of motion.        Back:       Right lower leg: No edema.      Left lower leg: No edema.   Lymphadenopathy:      Cervical: No cervical adenopathy.   Skin:     General: Skin is warm and dry.      Capillary Refill: Capillary refill takes less than 2 seconds.      Coloration: Skin is  "not jaundiced or pale.      Findings: No bruising, erythema, lesion or rash.   Neurological:      General: No focal deficit present.      Mental Status: She is alert.      Sensory: No sensory deficit.      Motor: No weakness.      Coordination: Coordination normal.      Gait: Gait abnormal.      Deep Tendon Reflexes: Reflexes normal.         Assessment:       1. Right lumbar radiculopathy        Plan:   1. Right lumbar radiculopathy  -     X-Ray Lumbar Spine Ap Lateral w/Flex Ext; Future; Expected date: 06/04/2024  -     Ambulatory referral/consult to Pain Clinic; Future; Expected date: 06/11/2024  -     Discontinue: pregabalin (LYRICA) 25 MG capsule; Take 1 capsule (25 mg total) by mouth 2 (two) times daily.  Dispense: 60 capsule; Refill: 2     "This note will not be shared with the patient."  Will do above treatment plan   Rtc as scheduled  "

## 2024-06-17 ENCOUNTER — TELEPHONE (OUTPATIENT)
Dept: PAIN MEDICINE | Facility: CLINIC | Age: 59
End: 2024-06-17

## 2024-06-17 ENCOUNTER — OFFICE VISIT (OUTPATIENT)
Dept: PAIN MEDICINE | Facility: CLINIC | Age: 59
End: 2024-06-17
Payer: COMMERCIAL

## 2024-06-17 DIAGNOSIS — M54.16 LUMBAR RADICULOPATHY: Primary | ICD-10-CM

## 2024-06-17 DIAGNOSIS — M54.16 LUMBAR RADICULITIS: ICD-10-CM

## 2024-06-17 PROCEDURE — 1160F RVW MEDS BY RX/DR IN RCRD: CPT | Mod: CPTII,S$GLB,, | Performed by: ANESTHESIOLOGY

## 2024-06-17 PROCEDURE — 99999 PR PBB SHADOW E&M-EST. PATIENT-LVL I: CPT | Mod: PBBFAC,,, | Performed by: ANESTHESIOLOGY

## 2024-06-17 PROCEDURE — 99214 OFFICE O/P EST MOD 30 MIN: CPT | Mod: S$GLB,,, | Performed by: ANESTHESIOLOGY

## 2024-06-17 PROCEDURE — 1159F MED LIST DOCD IN RCRD: CPT | Mod: CPTII,S$GLB,, | Performed by: ANESTHESIOLOGY

## 2024-06-17 PROCEDURE — 3044F HG A1C LEVEL LT 7.0%: CPT | Mod: CPTII,S$GLB,, | Performed by: ANESTHESIOLOGY

## 2024-06-17 NOTE — H&P (VIEW-ONLY)
EST patient evaluation  [facility]    Kayla Gray  : 1965  Date: 2024     CHIEF COMPLAINT:  No chief complaint on file.    Referring Physician: No ref. provider found  Primary Care Physician: Shell Mckee NP    HPI:  This is a 59 y.o. female with a chief complaint of No chief complaint on file.  . The patient has Past medical history/Past surgical history of asthma, hyperlipidemia, Long term blood thinner, avascular necrosis of the right hip    Patient was evaluated and referred by primary care provider for low back pain  X-ray lumbar spine obtained in 2024 showed no abnormal flexion-extension movement with a disc space narrowing at L5-S1 and facet arthropathy.    No previous MRI lumbar spine  She was started on Lyrica 25 mg b.i.d. in 2024 in addition to Flexeril as needed.  She was seen by orthopedic surgeon 2024 who rule out her right hip pathology causing her symptoms by performing right hip MRI.  Low back pain started in December, she started to see a chiropractor in February and this is when all the hip pain started. Numbness to lower extremity/foot started about 3 weeks ago.     Interval History 2024:  Kayla Gray is here for follow up visit after HER INITIAL EVALUATION IN .  She is here to discuss her MRI.  MRI bilateral hip  showed no sign of avascular necrosis.    X-ray bilateral hip showed mild degenerative changes  MRI lumbar spine showed At L5-S1 right sub articular foramina disc extrusion contributing to right lateral recess stenosis with disc approximating the exiting right S1 nerve root.  She Could not tolerate 1 dose of Lyrica 25 mg q.h.s.  Current pain score: 7/10      Diabetic: No    Anticogualtion drugs: Plavix     Allergy To Iodine: No    Currently on Antibiotic: No    Current Description of Pain Symptoms:    History of Recent Fall or Trauma: No   Onset: Chronic, started December  , right hip pain started 6 weeks and leg and feet for 3 weeks  Pain Location:  Right buttock, right hip, no groin pain, no tenderness over the SI joint or GTB  Radiates/associated symptoms: down RLE to foot, no left LE  Pain is Getting worse over the last 2 months    The pain is described as aching, numbing, and stabbing(Right leg is numb from knee down to foot)  Exacerbating factors: Sitting, Laying, riding in car.  Mitigating factors standing and moving.   Symptoms interfere with daily activity, sleeping.   The patient feels like symptoms have been worsening.   on physical exam she has 5/5 muscle power in bilateral lower extremities    Pain score:   Best: 3/10  Worst: 10/10    Current pain medications:    pregabalin (LYRICA) 25 MG capsule    Current Narcotics/Opioid /benzo Medications:  Opioids- None  Benzodiazepines: No    UDS:  NA    PDMP:  Reviewed and consistent with medication use as prescribed.     Previous Chronic Pain Treatment History:  Physical Therapy/HEP/Physician Lead Exercise Program:  [x]Chiropractor from 2024 to May 20, 2024, + 10 sessions- with no relief    Non-interventional Pain Therapy:  [x]Chiropractor from 2024 to May 20, 2024, + 10 sessions  []Acupuncture/Dry needle.  []TENS unit.  []Heat/ICE.  []Back Brace.    Medications previously tried:  NSAIDs: None Allergic to ASA, her cardiology place her on Plavix  Topical Agent: No  TCA/SSRI/SNRI: None  Anti-convulsants: Lyrica   Muscle Relaxants: Flexeril: tachycardia    Opioids- Oxycodone with Acetaminophen (Percocet).    Interventional Pain Procedures:  None     Previous spine/Relevant joint surgery:  none  Surgical History:   has a past surgical history that includes  section, classic; [place] tooth extraction (2017); Bilateral salpingoophorectomy (2017); Abdominal mass resection (2017); pr removal of ovary/tube(s); and Hysterectomy (2017).  Medical History:   has a past medical history of Asthma,  "Avascular necrosis of bone of right hip, Esophageal dilatation, GERD (gastroesophageal reflux disease), Hiatal hernia, and Yeast infection.  Family History:  family history includes Breast cancer in her paternal grandmother; Cancer in her maternal grandfather and paternal grandmother; Heart disease in her father and paternal grandmother.  Allergies:  Asa [aspirin] and Codeine   Social History/SUBSTANCE ABUSE HISTORY:  Personal history of substance abuse: No   reports that she has never smoked. She has never used smokeless tobacco. She reports that she does not drink alcohol and does not use drugs.  LABS:  CBC  Lab Results   Component Value Date    WBC 8.82 05/17/2024    HGB 13.7 05/17/2024    HCT 40.8 05/17/2024     Coagulation Profile   Lab Results   Component Value Date     05/17/2024     No results found for: "PT", "PTT", "INR"  CMP:  BMP  Lab Results   Component Value Date     05/17/2024    K 4.2 05/17/2024     05/17/2024    CO2 27 05/17/2024    BUN 14 05/17/2024    CREATININE 0.8 05/17/2024    CALCIUM 10.0 05/17/2024    ANIONGAP 11 05/17/2024    EGFRNORACEVR >60 05/17/2024     Lab Results   Component Value Date    ALT 41 05/17/2024    AST 24 05/17/2024    ALKPHOS 82 05/17/2024    BILITOT 0.4 05/17/2024     HGBA1C:  Lab Results   Component Value Date    HGBA1C 5.9 (H) 05/17/2024       ROS:    Review of Systems   GENERAL:  No weight loss, malaise or fevers.  HEENT:   No recent changes in vision or hearing  NECK:  Negative for lumps, no difficulty with swallowing.  RESPIRATORY:  Negative for cough, wheezing or shortness of breath, patient denies any recent URI.  CARDIOVASCULAR:  Negative for chest pain or palpitations.  GI:  Negative for abdominal discomfort, blood in stools or black stools or change in bowel habits.  MUSCULOSKELETAL:  See HPI.  SKIN:  Negative for lesions, rash, and itching.  PSYCH:  No mood disorder or recent psychosocial stressors.   HEMATOLOGY/LYMPHOLOGY:  See the blood " thinner sectioned in HPI.  NEURO:  See HPI  All other reviewed and negative other than HPI.    PHYSICAL EXAM:  VITALS: LMP 03/24/2017   There is no height or weight on file to calculate BMI.  GENERAL: Well appearing, in no acute distress, alert and oriented x3, answers questions appropriately.   PSYCH: Flat affect.  SKIN: Skin color, texture, turgor normal, no rashes or lesions.  HEAD/FACE:  Normocephalic, atraumatic. Cranial nerves grossly intact.  CV: Regular rate  PULM: No evidence of respiratory difficulty, symmetric chest rise.  GI:  Soft and non-Distended.  BACK/SIJ/HIP:  Lumbar Spine Exam:       Inspection: No erythema, bruising.       Palpation: (-) TTP of lumbar paraspinals bilaterally      ROM:  Limited in flexion, extension, lateral bending.       (-) Facet loading bilaterally      (+) Straight Leg Raise, right lower extremity++, very significant      (-) negative tenderness for right posterior superior iliac spine.  Hip Exam:      Inspection: No gross deformity or apparent leg length discrepancy      Palpation:  No TTP to bilateral greater trochanteric bursas.       ROM:  Positive limitation due to pain on external rotation of the right hip in compared to the left  Neurologic Exam:     Alert. Speech is fluent and appropriate.     Strength: 5/5 in bilateral hip flexion and knee extension     Sensation:  Grossly intact to light touch in bilateral lower extremities     Tone: No abnormality appreciated in bilateral lower extremities    GAIT:  Antalgic gait, unsteady gait, using a crutch due to pain    DIAGNOSTIC STUDIES AND MEDICAL RECORDS REVIEW:  XR LUMBAR SPINE AP AND LAT WITH FLEX/EXT 2024    Alignment: Mild dextroscoliotic curvature.  No abnormal movement with flexion or extension to suggest ligamentous instability.     Vertebrae: Vertebral body heights are maintained.  No suspicious appearing lytic or blastic lesions.     Discs and facets: Disc space narrowing at L5-S1. Facet arthropathy at  L5-S1.      XR HIPS BILATERAL 2 VIEW INCL AP PELVIS        FINDINGS:  Bone mineralization is maintained.  There are mild degenerate changes of the bilateral sacroiliac joints, hip joints and pubic symphysis.  The femoral heads demonstrate a normal spherical contour without evidence for   avascular necrosis.  No fracture or dislocation.    MR I bilateral hip was sign of avascular necrosis    MRI lumbar spine  showed At L5-S1 right sub articular foramina disc extrusion contributing to right lateral recess stenosis with disc approximating the exiting right S1 nerve root.  ASSESSMENT:  Kayla Gray is a 59 y.o. female with the following diagnoses based on history, exam, and imagin. Lumbar radiculopathy    2. Lumbar radiculitis     ----------------------------------------------------------  This is a pleasant 59 y.o. female patient with PMH/PSH of asthma, hyperlipidemia, long-term anticoagulation on Plavix presenting with right hip pain and right lower extremity numbness that has been worsening since 2023, she has completed multiple sessions of chiropractor sessions exercise between 2024 and 2024 with no significant relief,  she has a MRI lumbar spine   showed At L5-S1 right sub articular foramina disc extrusion contributing to right lateral recess stenosis with disc approximating the exiting right S1 nerve root Which correlates with her symptoms.    Treatment Plan:    Diagnostics/Referrals: PT     Medications:    NSAIDs:  Can not have due to blood thinners  Topical Agent: No  TCA/SSRI/SNRI: None  Anti-convulsants: Lyrica - per PCP  Muscle Relaxants: None  Opioids: None    Interventional Therapy: Please schedule for Right L5 and S1 Transforaminal epidural steroid injection .  Sedation: IV sedation  Clearance to stop Blood thinner: Plavix  7 days    Regarding the above interventions, the patient has been educated regarding the risks (including bleeding,  infection, increased pain, nerve damage, or allergic reaction), benefits, and alternatives. The patient states she understands and is eager to proceed.    Physical Rehabilitation: physical therapy    Patient Education: Counseled patient regarding the importance of activity modification, I have stressed the importance of physical activity and a home exercise plan to help with pain and improve health.    Follow-up: RTC  after injection    May consider: LESI( right-sided),  right SI joint injection.      Monique Josue MD  Anesthesiologist  Interventional Pain Medicine  06/17/2024    Disclaimer:  This note was prepared using voice recognition system and is likely to have sound alike errors that may have been overlooked even after proof reading.  Please call me with any questions.

## 2024-06-17 NOTE — TELEPHONE ENCOUNTER
----- Message from Monique Josue MD sent at 6/17/2024  8:24 AM CDT -----  · Interventional Therapy: Please schedule for Right L5 and S1 Transforaminal epidural steroid injection .  · Sedation: IV sedation  · Clearance to stop Blood thinner: Plavix - 7 days

## 2024-06-17 NOTE — PROGRESS NOTES
EST patient evaluation  [facility]    Kayla Gray  : 1965  Date: 2024     CHIEF COMPLAINT:  No chief complaint on file.    Referring Physician: No ref. provider found  Primary Care Physician: Shell Mckee NP    HPI:  This is a 59 y.o. female with a chief complaint of No chief complaint on file.  . The patient has Past medical history/Past surgical history of asthma, hyperlipidemia, Long term blood thinner, avascular necrosis of the right hip    Patient was evaluated and referred by primary care provider for low back pain  X-ray lumbar spine obtained in 2024 showed no abnormal flexion-extension movement with a disc space narrowing at L5-S1 and facet arthropathy.    No previous MRI lumbar spine  She was started on Lyrica 25 mg b.i.d. in 2024 in addition to Flexeril as needed.  She was seen by orthopedic surgeon 2024 who rule out her right hip pathology causing her symptoms by performing right hip MRI.  Low back pain started in December, she started to see a chiropractor in February and this is when all the hip pain started. Numbness to lower extremity/foot started about 3 weeks ago.     Interval History 2024:  Kayla Gray is here for follow up visit after HER INITIAL EVALUATION IN .  She is here to discuss her MRI.  MRI bilateral hip  showed no sign of avascular necrosis.    X-ray bilateral hip showed mild degenerative changes  MRI lumbar spine showed At L5-S1 right sub articular foramina disc extrusion contributing to right lateral recess stenosis with disc approximating the exiting right S1 nerve root.  She Could not tolerate 1 dose of Lyrica 25 mg q.h.s.  Current pain score: 7/10      Diabetic: No    Anticogualtion drugs: Plavix     Allergy To Iodine: No    Currently on Antibiotic: No    Current Description of Pain Symptoms:    History of Recent Fall or Trauma: No   Onset: Chronic, started December  , right hip pain started 6 weeks and leg and feet for 3 weeks  Pain Location:  Right buttock, right hip, no groin pain, no tenderness over the SI joint or GTB  Radiates/associated symptoms: down RLE to foot, no left LE  Pain is Getting worse over the last 2 months    The pain is described as aching, numbing, and stabbing(Right leg is numb from knee down to foot)  Exacerbating factors: Sitting, Laying, riding in car.  Mitigating factors standing and moving.   Symptoms interfere with daily activity, sleeping.   The patient feels like symptoms have been worsening.   on physical exam she has 5/5 muscle power in bilateral lower extremities    Pain score:   Best: 3/10  Worst: 10/10    Current pain medications:    pregabalin (LYRICA) 25 MG capsule    Current Narcotics/Opioid /benzo Medications:  Opioids- None  Benzodiazepines: No    UDS:  NA    PDMP:  Reviewed and consistent with medication use as prescribed.     Previous Chronic Pain Treatment History:  Physical Therapy/HEP/Physician Lead Exercise Program:  [x]Chiropractor from 2024 to May 20, 2024, + 10 sessions- with no relief    Non-interventional Pain Therapy:  [x]Chiropractor from 2024 to May 20, 2024, + 10 sessions  []Acupuncture/Dry needle.  []TENS unit.  []Heat/ICE.  []Back Brace.    Medications previously tried:  NSAIDs: None Allergic to ASA, her cardiology place her on Plavix  Topical Agent: No  TCA/SSRI/SNRI: None  Anti-convulsants: Lyrica   Muscle Relaxants: Flexeril: tachycardia    Opioids- Oxycodone with Acetaminophen (Percocet).    Interventional Pain Procedures:  None     Previous spine/Relevant joint surgery:  none  Surgical History:   has a past surgical history that includes  section, classic; [place] tooth extraction (2017); Bilateral salpingoophorectomy (2017); Abdominal mass resection (2017); pr removal of ovary/tube(s); and Hysterectomy (2017).  Medical History:   has a past medical history of Asthma,  "Avascular necrosis of bone of right hip, Esophageal dilatation, GERD (gastroesophageal reflux disease), Hiatal hernia, and Yeast infection.  Family History:  family history includes Breast cancer in her paternal grandmother; Cancer in her maternal grandfather and paternal grandmother; Heart disease in her father and paternal grandmother.  Allergies:  Asa [aspirin] and Codeine   Social History/SUBSTANCE ABUSE HISTORY:  Personal history of substance abuse: No   reports that she has never smoked. She has never used smokeless tobacco. She reports that she does not drink alcohol and does not use drugs.  LABS:  CBC  Lab Results   Component Value Date    WBC 8.82 05/17/2024    HGB 13.7 05/17/2024    HCT 40.8 05/17/2024     Coagulation Profile   Lab Results   Component Value Date     05/17/2024     No results found for: "PT", "PTT", "INR"  CMP:  BMP  Lab Results   Component Value Date     05/17/2024    K 4.2 05/17/2024     05/17/2024    CO2 27 05/17/2024    BUN 14 05/17/2024    CREATININE 0.8 05/17/2024    CALCIUM 10.0 05/17/2024    ANIONGAP 11 05/17/2024    EGFRNORACEVR >60 05/17/2024     Lab Results   Component Value Date    ALT 41 05/17/2024    AST 24 05/17/2024    ALKPHOS 82 05/17/2024    BILITOT 0.4 05/17/2024     HGBA1C:  Lab Results   Component Value Date    HGBA1C 5.9 (H) 05/17/2024       ROS:    Review of Systems   GENERAL:  No weight loss, malaise or fevers.  HEENT:   No recent changes in vision or hearing  NECK:  Negative for lumps, no difficulty with swallowing.  RESPIRATORY:  Negative for cough, wheezing or shortness of breath, patient denies any recent URI.  CARDIOVASCULAR:  Negative for chest pain or palpitations.  GI:  Negative for abdominal discomfort, blood in stools or black stools or change in bowel habits.  MUSCULOSKELETAL:  See HPI.  SKIN:  Negative for lesions, rash, and itching.  PSYCH:  No mood disorder or recent psychosocial stressors.   HEMATOLOGY/LYMPHOLOGY:  See the blood " thinner sectioned in HPI.  NEURO:  See HPI  All other reviewed and negative other than HPI.    PHYSICAL EXAM:  VITALS: LMP 03/24/2017   There is no height or weight on file to calculate BMI.  GENERAL: Well appearing, in no acute distress, alert and oriented x3, answers questions appropriately.   PSYCH: Flat affect.  SKIN: Skin color, texture, turgor normal, no rashes or lesions.  HEAD/FACE:  Normocephalic, atraumatic. Cranial nerves grossly intact.  CV: Regular rate  PULM: No evidence of respiratory difficulty, symmetric chest rise.  GI:  Soft and non-Distended.  BACK/SIJ/HIP:  Lumbar Spine Exam:       Inspection: No erythema, bruising.       Palpation: (-) TTP of lumbar paraspinals bilaterally      ROM:  Limited in flexion, extension, lateral bending.       (-) Facet loading bilaterally      (+) Straight Leg Raise, right lower extremity++, very significant      (-) negative tenderness for right posterior superior iliac spine.  Hip Exam:      Inspection: No gross deformity or apparent leg length discrepancy      Palpation:  No TTP to bilateral greater trochanteric bursas.       ROM:  Positive limitation due to pain on external rotation of the right hip in compared to the left  Neurologic Exam:     Alert. Speech is fluent and appropriate.     Strength: 5/5 in bilateral hip flexion and knee extension     Sensation:  Grossly intact to light touch in bilateral lower extremities     Tone: No abnormality appreciated in bilateral lower extremities    GAIT:  Antalgic gait, unsteady gait, using a crutch due to pain    DIAGNOSTIC STUDIES AND MEDICAL RECORDS REVIEW:  XR LUMBAR SPINE AP AND LAT WITH FLEX/EXT 2024    Alignment: Mild dextroscoliotic curvature.  No abnormal movement with flexion or extension to suggest ligamentous instability.     Vertebrae: Vertebral body heights are maintained.  No suspicious appearing lytic or blastic lesions.     Discs and facets: Disc space narrowing at L5-S1. Facet arthropathy at  L5-S1.      XR HIPS BILATERAL 2 VIEW INCL AP PELVIS        FINDINGS:  Bone mineralization is maintained.  There are mild degenerate changes of the bilateral sacroiliac joints, hip joints and pubic symphysis.  The femoral heads demonstrate a normal spherical contour without evidence for   avascular necrosis.  No fracture or dislocation.    MR I bilateral hip was sign of avascular necrosis    MRI lumbar spine  showed At L5-S1 right sub articular foramina disc extrusion contributing to right lateral recess stenosis with disc approximating the exiting right S1 nerve root.  ASSESSMENT:  Kayla Gray is a 59 y.o. female with the following diagnoses based on history, exam, and imagin. Lumbar radiculopathy    2. Lumbar radiculitis     ----------------------------------------------------------  This is a pleasant 59 y.o. female patient with PMH/PSH of asthma, hyperlipidemia, long-term anticoagulation on Plavix presenting with right hip pain and right lower extremity numbness that has been worsening since 2023, she has completed multiple sessions of chiropractor sessions exercise between 2024 and 2024 with no significant relief,  she has a MRI lumbar spine   showed At L5-S1 right sub articular foramina disc extrusion contributing to right lateral recess stenosis with disc approximating the exiting right S1 nerve root Which correlates with her symptoms.    Treatment Plan:    Diagnostics/Referrals: PT     Medications:    NSAIDs:  Can not have due to blood thinners  Topical Agent: No  TCA/SSRI/SNRI: None  Anti-convulsants: Lyrica - per PCP  Muscle Relaxants: None  Opioids: None    Interventional Therapy: Please schedule for Right L5 and S1 Transforaminal epidural steroid injection .  Sedation: IV sedation  Clearance to stop Blood thinner: Plavix  7 days    Regarding the above interventions, the patient has been educated regarding the risks (including bleeding,  infection, increased pain, nerve damage, or allergic reaction), benefits, and alternatives. The patient states she understands and is eager to proceed.    Physical Rehabilitation: physical therapy    Patient Education: Counseled patient regarding the importance of activity modification, I have stressed the importance of physical activity and a home exercise plan to help with pain and improve health.    Follow-up: RTC  after injection    May consider: LESI( right-sided),  right SI joint injection.      Monique Josue MD  Anesthesiologist  Interventional Pain Medicine  06/17/2024    Disclaimer:  This note was prepared using voice recognition system and is likely to have sound alike errors that may have been overlooked even after proof reading.  Please call me with any questions.

## 2024-06-28 ENCOUNTER — HOSPITAL ENCOUNTER (OUTPATIENT)
Facility: HOSPITAL | Age: 59
Discharge: HOME | End: 2024-06-28
Attending: ANESTHESIOLOGY | Admitting: ANESTHESIOLOGY
Payer: COMMERCIAL

## 2024-06-28 ENCOUNTER — HOSPITAL ENCOUNTER (OUTPATIENT)
Dept: RADIOLOGY | Facility: HOSPITAL | Age: 59
Discharge: HOME | End: 2024-06-28
Attending: ANESTHESIOLOGY | Admitting: ANESTHESIOLOGY
Payer: COMMERCIAL

## 2024-06-28 VITALS
OXYGEN SATURATION: 97 % | DIASTOLIC BLOOD PRESSURE: 78 MMHG | HEART RATE: 68 BPM | TEMPERATURE: 98 F | SYSTOLIC BLOOD PRESSURE: 139 MMHG | RESPIRATION RATE: 14 BRPM

## 2024-06-28 DIAGNOSIS — R52 PAIN: ICD-10-CM

## 2024-06-28 DIAGNOSIS — M54.16 LUMBAR RADICULOPATHY: ICD-10-CM

## 2024-06-28 DIAGNOSIS — M54.16 LUMBAR RADICULOPATHY: Primary | ICD-10-CM

## 2024-06-28 PROCEDURE — 25000003 PHARM REV CODE 250: Performed by: ANESTHESIOLOGY

## 2024-06-28 PROCEDURE — 76000 FLUOROSCOPY <1 HR PHYS/QHP: CPT | Mod: TC

## 2024-06-28 PROCEDURE — 64483 NJX AA&/STRD TFRM EPI L/S 1: CPT | Mod: RT,,, | Performed by: ANESTHESIOLOGY

## 2024-06-28 PROCEDURE — 64483 NJX AA&/STRD TFRM EPI L/S 1: CPT | Mod: RT | Performed by: ANESTHESIOLOGY

## 2024-06-28 PROCEDURE — 25500020 PHARM REV CODE 255: Performed by: ANESTHESIOLOGY

## 2024-06-28 PROCEDURE — 63600175 PHARM REV CODE 636 W HCPCS: Performed by: ANESTHESIOLOGY

## 2024-06-28 RX ORDER — MIDAZOLAM HYDROCHLORIDE 1 MG/ML
INJECTION INTRAMUSCULAR; INTRAVENOUS
Status: DISCONTINUED | OUTPATIENT
Start: 2024-06-28 | End: 2024-06-28 | Stop reason: HOSPADM

## 2024-06-28 RX ORDER — DEXAMETHASONE SODIUM PHOSPHATE 10 MG/ML
INJECTION INTRAMUSCULAR; INTRAVENOUS
Status: DISCONTINUED | OUTPATIENT
Start: 2024-06-28 | End: 2024-06-28 | Stop reason: HOSPADM

## 2024-06-28 RX ORDER — LIDOCAINE HYDROCHLORIDE 10 MG/ML
INJECTION, SOLUTION EPIDURAL; INFILTRATION; INTRACAUDAL; PERINEURAL
Status: DISCONTINUED | OUTPATIENT
Start: 2024-06-28 | End: 2024-06-28 | Stop reason: HOSPADM

## 2024-06-28 RX ORDER — SODIUM CHLORIDE 9 MG/ML
500 INJECTION, SOLUTION INTRAVENOUS CONTINUOUS
Status: DISCONTINUED | OUTPATIENT
Start: 2024-06-28 | End: 2024-06-28 | Stop reason: HOSPADM

## 2024-06-28 RX ORDER — LIDOCAINE HYDROCHLORIDE 20 MG/ML
INJECTION, SOLUTION INFILTRATION; PERINEURAL
Status: DISCONTINUED | OUTPATIENT
Start: 2024-06-28 | End: 2024-06-28 | Stop reason: HOSPADM

## 2024-06-28 RX ORDER — FENTANYL CITRATE 50 UG/ML
INJECTION, SOLUTION INTRAMUSCULAR; INTRAVENOUS
Status: DISCONTINUED | OUTPATIENT
Start: 2024-06-28 | End: 2024-06-28 | Stop reason: HOSPADM

## 2024-06-28 NOTE — OP NOTE
Lumbar Transforaminal Epidural Steroid Injection under Fluoroscopic Guidance    The procedure, risks, benefits, and options were discussed with the patient. There are no contraindications to the procedure. The patent expressed understanding and agreed to the procedure. Informed written consent was obtained prior to the start of the procedure and can be found in the patient's chart.    PATIENT NAME: Kayla Gray   MRN: [id number]     DATE OF PROCEDURE: 06/28/2024    PROCEDURE:  Right  L5/S1 and S1 Lumbar Transforaminal Epidural Steroid Injection under Fluoroscopic Guidance    PRE-OP DIAGNOSIS: Lumbar radiculopathy [M54.16] Lumbar radiculopathy [M54.16]    POST-OP DIAGNOSIS: Same    PHYSICIAN: Monique Josue MD      MEDICATIONS INJECTED: Preservative-free Decadron 10mg with 5cc of Lidocaine 1% MPF     LOCAL ANESTHETIC INJECTED: Xylocaine 2%     SEDATION: Versed 1mg and Fentanyl 100mcg                                                                                                                                                                                     Conscious sedation ordered by M.D. Patient re-evaluation prior to administration of conscious sedation. No changes noted in patient's status from initial evaluation. The patient's vital signs were monitored by RN and patient remained hemodynamically stable throughout the procedure.  No case tracking events are documented in the log.    ESTIMATED BLOOD LOSS: None    COMPLICATIONS: None    TECHNIQUE: Time-out was performed to identify the patient and procedure to be performed. With the patient laying in a prone position, the surgical area was prepped and draped in the usual sterile fashion using ChloraPrep and a fenestrated drape.The levels were determined under fluoroscopy guidance. Skin anesthesia was achieved by injecting Lidocaine 2% over the injection sites. The transforaminal spaces were then approached with a 22 gauge, 3.5 inch spinal quinke needle that  was introduced under fluoroscopic guidance in the AP and Lateral views. Once the needle tip was in the area of the transforaminal space, and there was no blood, CSF or paraesthesias, contrast dye Omnipaque (300mg/mL) was injected to confirm placement and there was no vascular runoff. Fluoroscopic imaging in the AP and lateral views revealed a clear outline of the spinal nerve with proximal spread of agent through the neural foramen into the epidural space. 2 mL of the medication mixture listed above was injected slowly at each site. Displacement of the radio opaque contrast after injection of the medication confirmed that the medication went into the area of the transforaminal spaces. The needles were removed and bleeding was nil. A sterile dressing was applied. No specimens collected. The patient tolerated the procedure well.       The patient was monitored after the procedure in the recovery area. They were given post-procedure and discharge instructions to follow at home. The patient was discharged in a stable condition.      Monique Josue MD

## 2024-06-28 NOTE — DISCHARGE SUMMARY
Discharge Note  Short Stay    Admit Date: 6/28/2024    Attending Physician: Monique Josue    Discharge Physician: Moniuqe Josue    Discharge Date: 6/28/2024 9:33 AM    Procedure(s) (LRB):  TRANSFORAMINAL EPIDURAL STEROID INJECTION (L5&S1) (Right)    Final Diagnosis: Lumbar radiculopathy [M54.16]    Disposition: Home or self care    Patient Instructions:   Current Discharge Medication List        CONTINUE these medications which have NOT CHANGED    Details   atorvastatin (LIPITOR) 20 MG tablet Take 20 mg by mouth every evening.      conjugated estrogens (PREMARIN) vaginal cream Place 1 g vaginally once daily.  Qty: 3 applicator, Refills: 11      cyclobenzaprine (FLEXERIL) 5 MG tablet Take 1 tablet (5 mg total) by mouth nightly as needed (right hip pain).  Qty: 30 tablet, Refills: 2    Associated Diagnoses: Right hip pain      esomeprazole (NEXIUM) 40 MG capsule Take 1 capsule (40 mg total) by mouth once daily.  Qty: 90 capsule, Refills: 3    Associated Diagnoses: Mild intermittent asthma without complication      hydroCHLOROthiazide (HYDRODIURIL) 12.5 MG Tab Take 12.5 mg by mouth once daily.      montelukast (SINGULAIR) 10 mg tablet TAKE 1 TABLET BY MOUTH EVERY DAY IN THE EVENING  Qty: 90 tablet, Refills: 3    Associated Diagnoses: Mild intermittent asthma without complication      potassium chloride SA (K-DUR,KLOR-CON) 20 MEQ tablet Take by mouth.      pregabalin (LYRICA) 25 MG capsule Take 1 capsule (25 mg total) by mouth 2 (two) times daily.  Qty: 60 capsule, Refills: 2    Associated Diagnoses: Right lumbar radiculopathy      albuterol (PROVENTIL/VENTOLIN HFA) 90 mcg/actuation inhaler TAKE 2 PUFFS BY MOUTH EVERY 6 HOURS AS NEEDED FOR WHEEZE OR SHORTNESS OF BREATH  Qty: 8 g, Refills: 9    Associated Diagnoses: Mild intermittent asthma without complication      ALPRAZolam (XANAX) 1 MG tablet Take 2 tablets (2 mg total) by mouth once. for 1 dose  Qty: 2 tablet, Refills: 0    Associated Diagnoses: Lumbar radiculopathy       clopidogreL (PLAVIX) 75 mg tablet Take 75 mg by mouth once daily.             Discharge Diagnosis: Lumbar radiculopathy [M54.16]  Condition on Discharge: Stable with no complications to procedure   Diet on Discharge: Same as before.  Activity: as per instruction sheet.  Discharge to: Home with a responsible adult.  Follow up: 2-4 weeks       Please call my office or pager at [phone] if experienced any weakness or loss of sensation, fever > 101.5, pain uncontrolled with oral medications, persistent nausea/vomiting/or diarrhea, redness or drainage from the incisions, or any other worrisome concerns. If physician on call was not reached or could not communicate with our office for any reason please go to the nearest emergency department.     Monique Josue  06/28/2024

## 2024-06-28 NOTE — DISCHARGE INSTRUCTIONS
Diet on Discharge: Same as before.  Activity: as per instruction sheet.  Discharge to: Home with a responsible adult.  Follow up: 2-4 weeks        Please call my office or pager at [phone] if experienced any weakness or loss of sensation, fever > 101.5, pain uncontrolled with oral medications, persistent nausea/vomiting/or diarrhea, redness or drainage from the incisions, or any other worrisome concerns. If physician on call was not reached or could not communicate with our office for any reason please go to the nearest emergency department.      Monique S AzerDiet on Discharge: Same as before.  Activity: as per instruction sheet.  Discharge to: Home with a responsible adult.  Follow up: 2-4 weeks        Please call my office or pager at [phone] if experienced any weakness or loss of sensation, fever > 101.5, pain uncontrolled with oral medications, persistent nausea/vomiting/or diarrhea, redness or drainage from the incisions, or any other worrisome concerns. If physician on call was not reached or could not communicate with our office for any reason please go to the nearest emergency department.      Monique S AzerDiet on Discharge: Same as before.  Activity: as per instruction sheet.  Discharge to: Home with a responsible adult.  Follow up: 2-4 weeks        Please call my office or pager at [phone] if experienced any weakness or loss of sensation, fever > 101.5, pain uncontrolled with oral medications, persistent nausea/vomiting/or diarrhea, redness or drainage from the incisions, or any other worrisome concerns. If physician on call was not reached or could not communicate with our office for any reason please go to the nearest emergency department.      Monique S AzerDiet on Discharge: Same as before.  Activity: as per instruction sheet.  Discharge to: Home with a responsible adult.  Follow up: 2-4 weeks        Please call my office or pager at [phone] if experienced any weakness or loss of  sensation, fever > 101.5, pain uncontrolled with oral medications, persistent nausea/vomiting/or diarrhea, redness or drainage from the incisions, or any other worrisome concerns. If physician on call was not reached or could not communicate with our office for any reason please go to the nearest emergency department.      Monique Josue        DIET: You may resume your normal diet today.    BATHING: You may resume your normal bathing.          You may shower, no hot water directly on site for 24 hours.    DRESSING: You may remove your bandage today.    ACTIVITY LEVEL: You may resume your normal activities 24 hours after your  procedure.    If you have received sedation or an anesthetic, you may feel sleepy      for several hours. Rest until you are more awake. Gradually resume your normal activities tomorrow.    If you have received sedation or an anesthetic, do not drive or operate heavy machinery for at least 24 hours.    MEDICATION: You may resume your normal medications today.    You will receive instructions for any pain prescriptions. Pain medications should be taken only as directed.    SPECIAL INSTRUCTIONS: No heat to the injection site for 24 hours including: bath or shower, heating pad, moist heat, hot tubs.    Use ice pack to injection site for any pain or discomfort. Apply ice pack to 20 minutes then remove for 20 minutes before re-applying to site.    WHEN TO CALL DOCTOR: Redness or swelling around injection site    Fever of 101F    Drainage (pus) from the injection site    For any continuous bleeding (some dried blood over the incision is normal).    FOLLOW UP: Follow up phone call will be made by office.

## 2024-07-01 ENCOUNTER — TELEPHONE (OUTPATIENT)
Dept: PAIN MEDICINE | Facility: CLINIC | Age: 59
End: 2024-07-01
Payer: COMMERCIAL

## 2024-07-01 NOTE — TELEPHONE ENCOUNTER
----- Message from Yoselyn Lee sent at 2024 11:11 AM CDT -----  Contact: Patient  Kayla Gray  MRN: [id number]  : 1965  PCP: Shell Mckee  Home Phone      [phone]  Work Phone      Not on file.  Mobile          [phone]      MESSAGE: Please return this call regarding her referral that was supposed to be sent [facility] to [facility] in [place]. She says they informed her that they had never gotten a referral for her.    PHONE; [phone]

## 2024-07-18 ENCOUNTER — TELEPHONE (OUTPATIENT)
Dept: PAIN MEDICINE | Facility: CLINIC | Age: 59
End: 2024-07-18

## 2024-07-18 ENCOUNTER — OFFICE VISIT (OUTPATIENT)
Dept: PAIN MEDICINE | Facility: CLINIC | Age: 59
End: 2024-07-18
Payer: COMMERCIAL

## 2024-07-18 VITALS — BODY MASS INDEX: 25.07 KG/M2 | WEIGHT: 132.69 LBS

## 2024-07-18 DIAGNOSIS — M54.16 LUMBAR RADICULITIS: Primary | ICD-10-CM

## 2024-07-18 DIAGNOSIS — M54.16 LUMBAR RADICULOPATHY: ICD-10-CM

## 2024-07-18 DIAGNOSIS — M54.16 RIGHT LUMBAR RADICULOPATHY: ICD-10-CM

## 2024-07-18 PROCEDURE — 1160F RVW MEDS BY RX/DR IN RCRD: CPT | Mod: CPTII,S$GLB,, | Performed by: ANESTHESIOLOGY

## 2024-07-18 PROCEDURE — 99999 PR PBB SHADOW E&M-EST. PATIENT-LVL III: CPT | Mod: PBBFAC,,, | Performed by: ANESTHESIOLOGY

## 2024-07-18 PROCEDURE — 1159F MED LIST DOCD IN RCRD: CPT | Mod: CPTII,S$GLB,, | Performed by: ANESTHESIOLOGY

## 2024-07-18 PROCEDURE — 3044F HG A1C LEVEL LT 7.0%: CPT | Mod: CPTII,S$GLB,, | Performed by: ANESTHESIOLOGY

## 2024-07-18 PROCEDURE — 3008F BODY MASS INDEX DOCD: CPT | Mod: CPTII,S$GLB,, | Performed by: ANESTHESIOLOGY

## 2024-07-18 PROCEDURE — 99214 OFFICE O/P EST MOD 30 MIN: CPT | Mod: S$GLB,,, | Performed by: ANESTHESIOLOGY

## 2024-07-18 NOTE — PROGRESS NOTES
EST patient evaluation  [facility]    Kayla Gray  : 1965  Date: 2024     CHIEF COMPLAINT:  No chief complaint on file.    Referring Physician: No ref. provider found  Primary Care Physician: Shell Mckee NP    HPI:  This is a 59 y.o. female with a chief complaint of No chief complaint on file.  . The patient has Past medical history/Past surgical history of asthma, hyperlipidemia, Long term blood thinner, avascular necrosis of the right hip    Patient was evaluated and referred by primary care provider for low back pain  X-ray lumbar spine obtained in 2024 showed no abnormal flexion-extension movement with a disc space narrowing at L5-S1 and facet arthropathy.    No previous MRI lumbar spine  She was started on Lyrica 25 mg b.i.d. in 2024 in addition to Flexeril as needed.  She was seen by orthopedic surgeon 2024 who rule out her right hip pathology causing her symptoms by performing right hip MRI.  Low back pain started in December, she started to see a chiropractor in February and this is when all the hip pain started. Numbness to lower extremity/foot started about 3 weeks ago.     Interval History 2024:  Kayla Gray is here for follow up visit after HER INITIAL EVALUATION IN .  She is here to discuss her MRI.  MRI bilateral hip  showed no sign of avascular necrosis.    X-ray bilateral hip showed mild degenerative changes  MRI lumbar spine showed At L5-S1 right sub articular foramina disc extrusion contributing to right lateral recess stenosis with disc approximating the exiting right S1 nerve root.  She Could not tolerate 1 dose of Lyrica 25 mg q.h.s.  Current pain score: 7/10    Interval History 2024:  Kayla Gray is here for follow up visit after  right L5 and S1 transforaminal epidural steroid injection in 2024, patient reported*** improvement in pain and  functionality.    She continues to take Lyrica as prescribed    Current pain score: ***/10        Diabetic: No    Anticogualtion drugs: Plavix     Allergy To Iodine: No    Currently on Antibiotic: No    Current Description of Pain Symptoms:    History of Recent Fall or Trauma: No   Onset: Chronic, started December 2023, right hip pain started 6 weeks and leg and feet for 3 weeks  Pain Location:  Right buttock, right hip, no groin pain, no tenderness over the SI joint or GTB  Radiates/associated symptoms: down RLE to foot, no left LE  The pain is described as aching, numbing, and stabbing(Right leg is numb from knee down to foot)  Exacerbating factors: Sitting, Laying, riding in car.  Mitigating factors standing and moving.   Symptoms interfere with daily activity, sleeping.   The patient feels like symptoms have been improving  on physical exam she has 5/5 muscle power in bilateral lower extremities    Pain score:   Best: 3/10  Worst: 10/10    Current pain medications:    pregabalin (LYRICA) 25 MG capsule,  b.I.d.    Current Narcotics/Opioid /benzo Medications:  Opioids- None  Benzodiazepines: No    UDS:  NA    PDMP:  Reviewed and consistent with medication use as prescribed.     Previous Chronic Pain Treatment History:  Physical Therapy/HEP/Physician Lead Exercise Program:  [x]Chiropractor from April 2024 to May 20, 2024, + 10 sessions- with no relief    Non-interventional Pain Therapy:  [x]Chiropractor from April 2024 to May 20, 2024, + 10 sessions  []Acupuncture/Dry needle.  []TENS unit.  []Heat/ICE.  []Back Brace.    Medications previously tried:  NSAIDs: None Allergic to ASA, her cardiology place her on Plavix  Topical Agent: No  TCA/SSRI/SNRI: None  Anti-convulsants: Lyrica   Muscle Relaxants: Flexeril: tachycardia    Opioids- Oxycodone with Acetaminophen (Percocet).    Interventional Pain Procedures:  ***    Previous spine/Relevant joint surgery:  none  Surgical History:   has a past surgical history that  "includes  section, classic; [place] tooth extraction (2017); Bilateral salpingoophorectomy (2017); Abdominal mass resection (2017); pr removal of ovary/tube(s); Hysterectomy (2017); and Transforaminal epidural injection of steroid (Right, 2024).  Medical History:   has a past medical history of Asthma, Avascular necrosis of bone of right hip, Esophageal dilatation, GERD (gastroesophageal reflux disease), Hiatal hernia, and Yeast infection.  Family History:  family history includes Breast cancer in her paternal grandmother; Cancer in her maternal grandfather and paternal grandmother; Heart disease in her father and paternal grandmother.  Allergies:  Asa [aspirin] and Codeine   Social History/SUBSTANCE ABUSE HISTORY:  Personal history of substance abuse: No   reports that she has never smoked. She has never used smokeless tobacco. She reports that she does not drink alcohol and does not use drugs.  LABS:  CBC  Lab Results   Component Value Date    WBC 8.82 2024    HGB 13.7 2024    HCT 40.8 2024     Coagulation Profile   Lab Results   Component Value Date     2024     No results found for: "PT", "PTT", "INR"  CMP:  BMP  Lab Results   Component Value Date     2024    K 4.2 2024     2024    CO2 27 2024    BUN 14 2024    CREATININE 0.8 2024    CALCIUM 10.0 2024    ANIONGAP 11 2024    EGFRNORACEVR >60 2024     Lab Results   Component Value Date    ALT 41 2024    AST 24 2024    ALKPHOS 82 2024    BILITOT 0.4 2024     HGBA1C:  Lab Results   Component Value Date    HGBA1C 5.9 (H) 2024     ROS:    Review of Systems   GENERAL:  No weight loss, malaise or fevers.  HEENT:   No recent changes in vision or hearing  NECK:  Negative for lumps, no difficulty with swallowing.  RESPIRATORY:  Negative for cough, wheezing or shortness of breath, patient denies any recent " URI.  CARDIOVASCULAR:  Negative for chest pain or palpitations.  GI:  Negative for abdominal discomfort, blood in stools or black stools or change in bowel habits.  MUSCULOSKELETAL:  See HPI.  SKIN:  Negative for lesions, rash, and itching.  PSYCH:  No mood disorder or recent psychosocial stressors.   HEMATOLOGY/LYMPHOLOGY:  See the blood thinner sectioned in HPI.  NEURO:  See HPI  All other reviewed and negative other than HPI.    PHYSICAL EXAM:  VITALS: [facility] 03/24/2017   There is no height or weight on file to calculate BMI.  GENERAL: Well appearing, in no acute distress, alert and oriented x3, answers questions appropriately.   PSYCH: Flat affect.  SKIN: Skin color, texture, turgor normal, no rashes or lesions.  HEAD/FACE:  Normocephalic, atraumatic. Cranial nerves grossly intact.  CV: Regular rate  PULM: No evidence of respiratory difficulty, symmetric chest rise.  GI:  Soft and non-Distended.  BACK/SIJ/HIP:  Lumbar Spine Exam:       Inspection: No erythema, bruising.       Palpation: (-) TTP of lumbar paraspinals bilaterally      ROM:  Limited in flexion, extension, lateral bending.       (-) Facet loading bilaterally      (+) Straight Leg Raise, right lower extremity++, very significant      (-) negative tenderness for right posterior superior iliac spine.  Hip Exam:      Inspection: No gross deformity or apparent leg length discrepancy      Palpation:  No TTP to bilateral greater trochanteric bursas.       ROM:  Positive limitation due to pain on external rotation of the right hip in compared to the left  Neurologic Exam:     Alert. Speech is fluent and appropriate.     Strength: 5/5 in bilateral hip flexion and knee extension     Sensation:  Grossly intact to light touch in bilateral lower extremities     Tone: No abnormality appreciated in bilateral lower extremities    GAIT:  Antalgic gait, unsteady gait, using a crutch due to pain    DIAGNOSTIC STUDIES AND MEDICAL RECORDS REVIEW:  XR LUMBAR SPINE AP AND  LAT WITH FLEX/EXT 2024    Alignment: Mild dextroscoliotic curvature.  No abnormal movement with flexion or extension to suggest ligamentous instability.     Vertebrae: Vertebral body heights are maintained.  No suspicious appearing lytic or blastic lesions.     Discs and facets: Disc space narrowing at L5-S1. Facet arthropathy at L5-S1.      XR HIPS BILATERAL 2 VIEW INCL AP PELVIS 2024    Bone mineralization is maintained.  There are mild degenerate changes of the bilateral sacroiliac joints, hip joints and pubic symphysis.  The femoral heads demonstrate a normal spherical contour without evidence for   avascular necrosis.  No fracture or dislocation.    MR I bilateral hip was no sign of avascular necrosis    MRI lumbar spine 2024 showed At L5-S1 right sub articular foramina disc extrusion contributing to right lateral recess stenosis with disc approximating the exiting right S1 nerve root.  ASSESSMENT:  Kayla Gray is a 59 y.o. female with the following diagnoses based on history, exam, and imaging:  There are no diagnoses linked to this encounter.     ----------------------------------------------------------  This is a pleasant 59 y.o. female patient with PMH/PSH of asthma, hyperlipidemia, long-term anticoagulation on Plavix presenting with right hip pain and right lower extremity numbness that has been worsening since December 20, 2023, she has completed multiple sessions of chiropractor sessions exercise between February , 2024 and April, 2024 with no significant relief,  she has a MRI lumbar spine 2024  showed At L5-S1 right sub articular foramina disc extrusion contributing to right lateral recess stenosis with disc approximating the exiting right S1 nerve root Which correlates with her symptoms,  she has a right L5 and S1 transforaminal epidural steroid injection with significant improvement in pain and functionality, patient happy and satisfied with the current pain management  plan    Treatment Plan:    Diagnostics/Referrals: PT     Medications:    NSAIDs:  Can not have due to blood thinners  Topical Agent: No  TCA/SSRI/SNRI: None  Anti-convulsants: Lyrica - per PCP  Muscle Relaxants: None  Opioids: None    Interventional Therapy: Please schedule for  lumbar epidural steroid injection at L5-S1  Sedation:  sedation  Clearance to stop Blood thinner: Plavix  7 days    Regarding the above interventions, the patient has been educated regarding the risks (including bleeding, infection, increased pain, nerve damage, or allergic reaction), benefits, and alternatives. The patient states she understands and is eager to proceed.    Physical Rehabilitation: physical therapy    Patient Education: Counseled patient regarding the importance of activity modification, I have stressed the importance of physical activity and a home exercise plan to help with pain and improve health.    Follow-up: RTC  after injection    May consider: LESI( right-sided),  right SI joint injection.      Monique Josue MD  Anesthesiologist  Interventional Pain Medicine  07/18/2024    Disclaimer:  This note was prepared using voice recognition system and is likely to have sound alike errors that may have been overlooked even after proof reading.  Please call me with any questions.

## 2024-07-18 NOTE — H&P (VIEW-ONLY)
EST patient evaluation  [facility]    Kayla Gray  : 1965  Date: 2024     CHIEF COMPLAINT:   right lower extremity    Referring Physician: No ref. provider found  Primary Care Physician: Shell Mckee, NP    HPI:  This is a 59 y.o. female with a chief complaint of  right lower extremity  . The patient has Past medical history/Past surgical history of asthma, hyperlipidemia, Long term blood thinner, avascular necrosis of the right hip    Patient was evaluated and referred by primary care provider for low back pain  X-ray lumbar spine obtained in 2024 showed no abnormal flexion-extension movement with a disc space narrowing at L5-S1 and facet arthropathy.    No previous MRI lumbar spine  She was started on Lyrica 25 mg b.i.d. in 2024 in addition to Flexeril as needed.  She was seen by orthopedic surgeon 2024 who rule out her right hip pathology causing her symptoms by performing right hip MRI.  Low back pain started in December, she started to see a chiropractor in February and this is when all the hip pain started. Numbness to lower extremity/foot started about 3 weeks ago.     Interval History 2024:  Kayla Gray is here for follow up visit after HER INITIAL EVALUATION IN .  She is here to discuss her MRI.  MRI bilateral hip  showed no sign of avascular necrosis.    X-ray bilateral hip showed mild degenerative changes  MRI lumbar spine showed At L5-S1 right sub articular foramina disc extrusion contributing to right lateral recess stenosis with disc approximating the exiting right S1 nerve root.  She Could not tolerate 1 dose of Lyrica 25 mg q.h.s.  Current pain score: 7/10    Interval History 2024:  Kayla Gray is here for follow up visit after right L5 and S1 transforaminal epidural steroid injection in 2024, patient reported 50% improvement in pain and functionality in  the first week only after injection, however  she reported 100% relief in her right lower extremity numbness.  She continues to take Lyrica as prescribed  Current pain score: 6/10    Diabetic: No    Anticogualtion drugs: Plavix     Allergy To Iodine: No    Currently on Antibiotic: No    Current Description of Pain Symptoms:    History of Recent Fall or Trauma: No   Onset: Chronic, started December 2023, right hip pain started 04/2024 and leg and feet for 05/2024  Pain Location:  Right buttock, right hip, no groin pain, no tenderness over the SI joint or GTB  Radiates/associated symptoms: down RLE to foot, no left LE  The pain is described as aching, numbing, and stabbing(Right leg is numb from knee down to foot: Improving)  Exacerbating factors: Sitting, Laying, riding in car.  Mitigating factors standing and moving.   Symptoms interfere with daily activity, sleeping.   The patient feels like symptoms have been unchanged  on physical exam she has 5/5 muscle power in bilateral lower extremities    Pain score:   Best: 3/10  Worst: 10/10    Current pain medications:    pregabalin (LYRICA) 25 MG capsule,  b.I.d.    Current Narcotics/Opioid /benzo Medications:  Opioids- None  Benzodiazepines: No    UDS:  NA    PDMP:  Reviewed and consistent with medication use as prescribed.     Previous Chronic Pain Treatment History:  Physical Therapy/HEP/Physician Lead Exercise Program:  [x]Chiropractor from April 2024 to July 20, 2024, + 10 sessions- with no relief    Non-interventional Pain Therapy:  [x]Chiropractor from April 2024 to May 20, 2024, + 10 sessions  []Acupuncture/Dry needle.  []TENS unit.  []Heat/ICE.  []Back Brace.    Medications previously tried:  NSAIDs: None Allergic to ASA, her cardiology place her on Plavix  Topical Agent: No  TCA/SSRI/SNRI: None  Anti-convulsants: Lyrica   Muscle Relaxants: Flexeril: tachycardia    Opioids- Oxycodone with Acetaminophen (Percocet).    Interventional Pain Procedures:  right L5 and  "S1 transforaminal epidural steroid injection  - 50% pain relief for pain, and 100% relief for the numbness    Previous spine/Relevant joint surgery:  none  Surgical History:   has a past surgical history that includes  section, classic; [place] tooth extraction (2017); Bilateral salpingoophorectomy (2017); Abdominal mass resection (2017); pr removal of ovary/tube(s); Hysterectomy (2017); and Transforaminal epidural injection of steroid (Right, 2024).  Medical History:   has a past medical history of Asthma, Avascular necrosis of bone of right hip, Esophageal dilatation, GERD (gastroesophageal reflux disease), Hiatal hernia, and Yeast infection.  Family History:  family history includes Breast cancer in her paternal grandmother; Cancer in her maternal grandfather and paternal grandmother; Heart disease in her father and paternal grandmother.  Allergies:  Asa [aspirin] and Codeine   Social History/SUBSTANCE ABUSE HISTORY:  Personal history of substance abuse: No   reports that she has never smoked. She has never used smokeless tobacco. She reports that she does not drink alcohol and does not use drugs.  LABS:  CBC  Lab Results   Component Value Date    WBC 8.82 2024    HGB 13.7 2024    HCT 40.8 2024     Coagulation Profile   Lab Results   Component Value Date     2024     No results found for: "PT", "PTT", "INR"  CMP:  BMP  Lab Results   Component Value Date     2024    K 4.2 2024     2024    CO2 27 2024    BUN 14 2024    CREATININE 0.8 2024    CALCIUM 10.0 2024    ANIONGAP 11 2024    EGFRNORACEVR >60 2024     Lab Results   Component Value Date    ALT 41 2024    AST 24 2024    ALKPHOS 82 2024    BILITOT 0.4 2024     HGBA1C:  Lab Results   Component Value Date    HGBA1C 5.9 (H) 2024     ROS:    Review of Systems   GENERAL:  No weight loss, malaise or " fevers.  HEENT:   No recent changes in vision or hearing  NECK:  Negative for lumps, no difficulty with swallowing.  RESPIRATORY:  Negative for cough, wheezing or shortness of breath, patient denies any recent URI.  CARDIOVASCULAR:  Negative for chest pain or palpitations.  GI:  Negative for abdominal discomfort, blood in stools or black stools or change in bowel habits.  MUSCULOSKELETAL:  See HPI.  SKIN:  Negative for lesions, rash, and itching.  PSYCH:  No mood disorder or recent psychosocial stressors.   HEMATOLOGY/LYMPHOLOGY:  See the blood thinner sectioned in HPI.  NEURO:  See HPI  All other reviewed and negative other than HPI.    PHYSICAL EXAM:  VITALS: Wt 60.2 kg (132 lb 11.2 oz)   LMP 03/24/2017   BMI 25.07 kg/m²   Body mass index is 25.07 kg/m².  GENERAL: Well appearing, in no acute distress, alert and oriented x3, answers questions appropriately.   PSYCH: Flat affect.  SKIN: Skin color, texture, turgor normal, no rashes or lesions.  HEAD/FACE:  Normocephalic, atraumatic. Cranial nerves grossly intact.  CV: Regular rate  PULM: No evidence of respiratory difficulty, symmetric chest rise.  GI:  Soft and non-Distended.  BACK/SIJ/HIP:  Lumbar Spine Exam:       Inspection: No erythema, bruising.       Palpation: (-) TTP of lumbar paraspinals bilaterally      ROM:  Limited in flexion, extension, lateral bending.       (-) Facet loading bilaterally      (+) Straight Leg Raise, right lower extremity++, very significant      (-) negative tenderness for right posterior superior iliac spine.  Hip Exam:      Inspection: No gross deformity or apparent leg length discrepancy      Palpation:  No TTP to bilateral greater trochanteric bursas.       ROM:  Positive limitation due to pain on external rotation of the right hip in compared to the left  Neurologic Exam:     Alert. Speech is fluent and appropriate.     Strength: 5/5 in bilateral hip flexion and knee extension     Sensation:  Grossly intact to light touch in  bilateral lower extremities     Tone: No abnormality appreciated in bilateral lower extremities    GAIT:  Antalgic gait, unsteady gait.    DIAGNOSTIC STUDIES AND MEDICAL RECORDS REVIEW:  XR LUMBAR SPINE AP AND LAT WITH FLEX/EXT     Alignment: Mild dextroscoliotic curvature.  No abnormal movement with flexion or extension to suggest ligamentous instability.     Vertebrae: Vertebral body heights are maintained.  No suspicious appearing lytic or blastic lesions.     Discs and facets: Disc space narrowing at L5-S1. Facet arthropathy at L5-S1.      XR HIPS BILATERAL 2 VIEW INCL AP PELVIS     Bone mineralization is maintained.  There are mild degenerate changes of the bilateral sacroiliac joints, hip joints and pubic symphysis.  The femoral heads demonstrate a normal spherical contour without evidence for   avascular necrosis.  No fracture or dislocation.    MR I bilateral hip was no sign of avascular necrosis    MRI lumbar spine  showed At L5-S1 right sub articular foramina disc extrusion contributing to right lateral recess stenosis with disc approximating the exiting right S1 nerve root.    ASSESSMENT:  Kayla Gray is a 59 y.o. female with the following diagnoses based on history, exam, and imagin. Lumbar radiculitis  - Ambulatory referral/consult to Neurosurgery; Future    2. Right lumbar radiculopathy    3. Lumbar radiculopathy    --------------------------------------------------------  This is a pleasant 59 y.o. female patient with PMH/PSH of asthma, hyperlipidemia, long-term anticoagulation on Plavix presenting with right hip pain and right lower extremity  pain andnumbness that has been worsening since , she has completed multiple sessions of chiropractor sessions exercise between 2024 and  with no significant relief,  she has a MRI lumbar spine   showed At L5-S1 right sub articular foramina disc extrusion contributing to right lateral recess  stenosis with disc approximating the exiting right S1 nerve root Which correlates with her symptoms,  she had a right L5 and S1 transforaminal epidural steroid injection with  relief her numbness but did not effect her pain significantly  Treatment Plan:    Diagnostics/Referrals:  she would like to have a neurosurgical referral as a 2nd opinion    Medications:    NSAIDs:  Can not have due to blood thinners  Topical Agent: No  TCA/SSRI/SNRI: None  Anti-convulsants: Lyrica - per PCP  Muscle Relaxants: None  Opioids: None    Interventional Therapy: Please schedule for  lumbar epidural steroid injection at L5-S1- Right sided   Sedation:  oral sedation  Clearance to stop Blood thinner: Plavix 7 days.    Regarding the above interventions, the patient has been educated regarding the risks (including bleeding, infection, increased pain, nerve damage, or allergic reaction), benefits, and alternatives. The patient states she understands and is eager to proceed.    Physical Rehabilitation: physical therapy    Patient Education: Counseled patient regarding the importance of activity modification, I have stressed the importance of physical activity and a home exercise plan to help with pain and improve health.    Follow-up: RTC  after injection    May consider: LESI( right-sided),  right SI joint injection.      Monique Josue MD  Anesthesiologist  Interventional Pain Medicine  07/18/2024    Disclaimer:  This note was prepared using voice recognition system and is likely to have sound alike errors that may have been overlooked even after proof reading.  Please call me with any questions.

## 2024-07-18 NOTE — PROGRESS NOTES
EST patient evaluation  [facility]    Kayla Gray  : 1965  Date: 2024     CHIEF COMPLAINT:   right lower extremity    Referring Physician: No ref. provider found  Primary Care Physician: Shell Mckee, NP    HPI:  This is a 59 y.o. female with a chief complaint of  right lower extremity  . The patient has Past medical history/Past surgical history of asthma, hyperlipidemia, Long term blood thinner, avascular necrosis of the right hip    Patient was evaluated and referred by primary care provider for low back pain  X-ray lumbar spine obtained in 2024 showed no abnormal flexion-extension movement with a disc space narrowing at L5-S1 and facet arthropathy.    No previous MRI lumbar spine  She was started on Lyrica 25 mg b.i.d. in 2024 in addition to Flexeril as needed.  She was seen by orthopedic surgeon 2024 who rule out her right hip pathology causing her symptoms by performing right hip MRI.  Low back pain started in December, she started to see a chiropractor in February and this is when all the hip pain started. Numbness to lower extremity/foot started about 3 weeks ago.     Interval History 2024:  Kayla Gray is here for follow up visit after HER INITIAL EVALUATION IN .  She is here to discuss her MRI.  MRI bilateral hip  showed no sign of avascular necrosis.    X-ray bilateral hip showed mild degenerative changes  MRI lumbar spine showed At L5-S1 right sub articular foramina disc extrusion contributing to right lateral recess stenosis with disc approximating the exiting right S1 nerve root.  She Could not tolerate 1 dose of Lyrica 25 mg q.h.s.  Current pain score: 7/10    Interval History 2024:  Kayla Gray is here for follow up visit after right L5 and S1 transforaminal epidural steroid injection in 2024, patient reported 50% improvement in pain and functionality in  the first week only after injection, however  she reported 100% relief in her right lower extremity numbness.  She continues to take Lyrica as prescribed  Current pain score: 6/10    Diabetic: No    Anticogualtion drugs: Plavix     Allergy To Iodine: No    Currently on Antibiotic: No    Current Description of Pain Symptoms:    History of Recent Fall or Trauma: No   Onset: Chronic, started December 2023, right hip pain started 04/2024 and leg and feet for 05/2024  Pain Location:  Right buttock, right hip, no groin pain, no tenderness over the SI joint or GTB  Radiates/associated symptoms: down RLE to foot, no left LE  The pain is described as aching, numbing, and stabbing(Right leg is numb from knee down to foot: Improving)  Exacerbating factors: Sitting, Laying, riding in car.  Mitigating factors standing and moving.   Symptoms interfere with daily activity, sleeping.   The patient feels like symptoms have been unchanged  on physical exam she has 5/5 muscle power in bilateral lower extremities    Pain score:   Best: 3/10  Worst: 10/10    Current pain medications:    pregabalin (LYRICA) 25 MG capsule,  b.I.d.    Current Narcotics/Opioid /benzo Medications:  Opioids- None  Benzodiazepines: No    UDS:  NA    PDMP:  Reviewed and consistent with medication use as prescribed.     Previous Chronic Pain Treatment History:  Physical Therapy/HEP/Physician Lead Exercise Program:  [x]Chiropractor from April 2024 to July 20, 2024, + 10 sessions- with no relief    Non-interventional Pain Therapy:  [x]Chiropractor from April 2024 to May 20, 2024, + 10 sessions  []Acupuncture/Dry needle.  []TENS unit.  []Heat/ICE.  []Back Brace.    Medications previously tried:  NSAIDs: None Allergic to ASA, her cardiology place her on Plavix  Topical Agent: No  TCA/SSRI/SNRI: None  Anti-convulsants: Lyrica   Muscle Relaxants: Flexeril: tachycardia    Opioids- Oxycodone with Acetaminophen (Percocet).    Interventional Pain Procedures:  right L5 and  "S1 transforaminal epidural steroid injection  - 50% pain relief for pain, and 100% relief for the numbness    Previous spine/Relevant joint surgery:  none  Surgical History:   has a past surgical history that includes  section, classic; [place] tooth extraction (2017); Bilateral salpingoophorectomy (2017); Abdominal mass resection (2017); pr removal of ovary/tube(s); Hysterectomy (2017); and Transforaminal epidural injection of steroid (Right, 2024).  Medical History:   has a past medical history of Asthma, Avascular necrosis of bone of right hip, Esophageal dilatation, GERD (gastroesophageal reflux disease), Hiatal hernia, and Yeast infection.  Family History:  family history includes Breast cancer in her paternal grandmother; Cancer in her maternal grandfather and paternal grandmother; Heart disease in her father and paternal grandmother.  Allergies:  Asa [aspirin] and Codeine   Social History/SUBSTANCE ABUSE HISTORY:  Personal history of substance abuse: No   reports that she has never smoked. She has never used smokeless tobacco. She reports that she does not drink alcohol and does not use drugs.  LABS:  CBC  Lab Results   Component Value Date    WBC 8.82 2024    HGB 13.7 2024    HCT 40.8 2024     Coagulation Profile   Lab Results   Component Value Date     2024     No results found for: "PT", "PTT", "INR"  CMP:  BMP  Lab Results   Component Value Date     2024    K 4.2 2024     2024    CO2 27 2024    BUN 14 2024    CREATININE 0.8 2024    CALCIUM 10.0 2024    ANIONGAP 11 2024    EGFRNORACEVR >60 2024     Lab Results   Component Value Date    ALT 41 2024    AST 24 2024    ALKPHOS 82 2024    BILITOT 0.4 2024     HGBA1C:  Lab Results   Component Value Date    HGBA1C 5.9 (H) 2024     ROS:    Review of Systems   GENERAL:  No weight loss, malaise or " fevers.  HEENT:   No recent changes in vision or hearing  NECK:  Negative for lumps, no difficulty with swallowing.  RESPIRATORY:  Negative for cough, wheezing or shortness of breath, patient denies any recent URI.  CARDIOVASCULAR:  Negative for chest pain or palpitations.  GI:  Negative for abdominal discomfort, blood in stools or black stools or change in bowel habits.  MUSCULOSKELETAL:  See HPI.  SKIN:  Negative for lesions, rash, and itching.  PSYCH:  No mood disorder or recent psychosocial stressors.   HEMATOLOGY/LYMPHOLOGY:  See the blood thinner sectioned in HPI.  NEURO:  See HPI  All other reviewed and negative other than HPI.    PHYSICAL EXAM:  VITALS: Wt 60.2 kg (132 lb 11.2 oz)   LMP 03/24/2017   BMI 25.07 kg/m²   Body mass index is 25.07 kg/m².  GENERAL: Well appearing, in no acute distress, alert and oriented x3, answers questions appropriately.   PSYCH: Flat affect.  SKIN: Skin color, texture, turgor normal, no rashes or lesions.  HEAD/FACE:  Normocephalic, atraumatic. Cranial nerves grossly intact.  CV: Regular rate  PULM: No evidence of respiratory difficulty, symmetric chest rise.  GI:  Soft and non-Distended.  BACK/SIJ/HIP:  Lumbar Spine Exam:       Inspection: No erythema, bruising.       Palpation: (-) TTP of lumbar paraspinals bilaterally      ROM:  Limited in flexion, extension, lateral bending.       (-) Facet loading bilaterally      (+) Straight Leg Raise, right lower extremity++, very significant      (-) negative tenderness for right posterior superior iliac spine.  Hip Exam:      Inspection: No gross deformity or apparent leg length discrepancy      Palpation:  No TTP to bilateral greater trochanteric bursas.       ROM:  Positive limitation due to pain on external rotation of the right hip in compared to the left  Neurologic Exam:     Alert. Speech is fluent and appropriate.     Strength: 5/5 in bilateral hip flexion and knee extension     Sensation:  Grossly intact to light touch in  bilateral lower extremities     Tone: No abnormality appreciated in bilateral lower extremities    GAIT:  Antalgic gait, unsteady gait.    DIAGNOSTIC STUDIES AND MEDICAL RECORDS REVIEW:  XR LUMBAR SPINE AP AND LAT WITH FLEX/EXT     Alignment: Mild dextroscoliotic curvature.  No abnormal movement with flexion or extension to suggest ligamentous instability.     Vertebrae: Vertebral body heights are maintained.  No suspicious appearing lytic or blastic lesions.     Discs and facets: Disc space narrowing at L5-S1. Facet arthropathy at L5-S1.      XR HIPS BILATERAL 2 VIEW INCL AP PELVIS     Bone mineralization is maintained.  There are mild degenerate changes of the bilateral sacroiliac joints, hip joints and pubic symphysis.  The femoral heads demonstrate a normal spherical contour without evidence for   avascular necrosis.  No fracture or dislocation.    MR I bilateral hip was no sign of avascular necrosis    MRI lumbar spine  showed At L5-S1 right sub articular foramina disc extrusion contributing to right lateral recess stenosis with disc approximating the exiting right S1 nerve root.    ASSESSMENT:  Kayla Gray is a 59 y.o. female with the following diagnoses based on history, exam, and imagin. Lumbar radiculitis  - Ambulatory referral/consult to Neurosurgery; Future    2. Right lumbar radiculopathy    3. Lumbar radiculopathy    --------------------------------------------------------  This is a pleasant 59 y.o. female patient with PMH/PSH of asthma, hyperlipidemia, long-term anticoagulation on Plavix presenting with right hip pain and right lower extremity  pain andnumbness that has been worsening since , she has completed multiple sessions of chiropractor sessions exercise between 2024 and  with no significant relief,  she has a MRI lumbar spine   showed At L5-S1 right sub articular foramina disc extrusion contributing to right lateral recess  stenosis with disc approximating the exiting right S1 nerve root Which correlates with her symptoms,  she had a right L5 and S1 transforaminal epidural steroid injection with  relief her numbness but did not effect her pain significantly  Treatment Plan:    Diagnostics/Referrals:  she would like to have a neurosurgical referral as a 2nd opinion    Medications:    NSAIDs:  Can not have due to blood thinners  Topical Agent: No  TCA/SSRI/SNRI: None  Anti-convulsants: Lyrica - per PCP  Muscle Relaxants: None  Opioids: None    Interventional Therapy: Please schedule for  lumbar epidural steroid injection at L5-S1- Right sided   Sedation:  oral sedation  Clearance to stop Blood thinner: Plavix 7 days.    Regarding the above interventions, the patient has been educated regarding the risks (including bleeding, infection, increased pain, nerve damage, or allergic reaction), benefits, and alternatives. The patient states she understands and is eager to proceed.    Physical Rehabilitation: physical therapy    Patient Education: Counseled patient regarding the importance of activity modification, I have stressed the importance of physical activity and a home exercise plan to help with pain and improve health.    Follow-up: RTC  after injection    May consider: LESI( right-sided),  right SI joint injection.      Monique Josue MD  Anesthesiologist  Interventional Pain Medicine  07/18/2024    Disclaimer:  This note was prepared using voice recognition system and is likely to have sound alike errors that may have been overlooked even after proof reading.  Please call me with any questions.

## 2024-07-18 NOTE — TELEPHONE ENCOUNTER
----- Message from Monique Josue MD sent at 7/18/2024  8:55 AM CDT -----    · Interventional Therapy: Please schedule for lumbar epidural steroid injection at L5-S1- Right sided   · Sedation:  oral sedation  · Clearance to stop Blood thinner: Plavix  7 days

## 2024-07-19 ENCOUNTER — TELEPHONE (OUTPATIENT)
Dept: PRIMARY CARE CLINIC | Facility: CLINIC | Age: 59
End: 2024-07-19
Payer: COMMERCIAL

## 2024-07-19 NOTE — TELEPHONE ENCOUNTER
"----- Message from Mitzi Chau MA sent at 7/18/2024  4:32 PM CDT -----  Hi    I called Ms. Gray to schedule with Neurosurgery. Patient states" she would like to remain in the [place] area and see Dr. Latricia Santamaria. Please faxed referral and MRI results to office @ [phone], so patient can be scheduled.    Thanks  "

## 2024-07-19 NOTE — TELEPHONE ENCOUNTER
The referral for Neurosurgery was faxed over to Dr. Latricia Santamaria office on 7/19/24 at 12:42 pm.

## 2024-07-24 ENCOUNTER — PATIENT MESSAGE (OUTPATIENT)
Dept: INTERNAL MEDICINE | Facility: CLINIC | Age: 59
End: 2024-07-24
Payer: COMMERCIAL

## 2024-07-24 RX ORDER — PREGABALIN 50 MG/1
50 CAPSULE ORAL 2 TIMES DAILY
Qty: 60 CAPSULE | Refills: 2 | Status: SHIPPED | OUTPATIENT
Start: 2024-07-24

## 2024-07-26 ENCOUNTER — HOSPITAL ENCOUNTER (OUTPATIENT)
Dept: RADIOLOGY | Facility: HOSPITAL | Age: 59
Discharge: HOME | End: 2024-07-26
Attending: ANESTHESIOLOGY | Admitting: ANESTHESIOLOGY
Payer: COMMERCIAL

## 2024-07-26 ENCOUNTER — HOSPITAL ENCOUNTER (OUTPATIENT)
Facility: HOSPITAL | Age: 59
Discharge: HOME | End: 2024-07-26
Attending: ANESTHESIOLOGY | Admitting: ANESTHESIOLOGY
Payer: COMMERCIAL

## 2024-07-26 VITALS
DIASTOLIC BLOOD PRESSURE: 74 MMHG | TEMPERATURE: 98 F | RESPIRATION RATE: 16 BRPM | SYSTOLIC BLOOD PRESSURE: 157 MMHG | HEART RATE: 77 BPM | OXYGEN SATURATION: 96 %

## 2024-07-26 DIAGNOSIS — M54.16 LUMBAR RADICULITIS: Primary | ICD-10-CM

## 2024-07-26 DIAGNOSIS — M54.16 LUMBAR RADICULITIS: ICD-10-CM

## 2024-07-26 PROCEDURE — 63600175 PHARM REV CODE 636 W HCPCS: Performed by: ANESTHESIOLOGY

## 2024-07-26 PROCEDURE — 25500020 PHARM REV CODE 255: Performed by: ANESTHESIOLOGY

## 2024-07-26 PROCEDURE — 62323 NJX INTERLAMINAR LMBR/SAC: CPT | Mod: ,,, | Performed by: ANESTHESIOLOGY

## 2024-07-26 PROCEDURE — 25000003 PHARM REV CODE 250: Performed by: ANESTHESIOLOGY

## 2024-07-26 PROCEDURE — 76000 FLUOROSCOPY <1 HR PHYS/QHP: CPT | Mod: TC

## 2024-07-26 PROCEDURE — 62323 NJX INTERLAMINAR LMBR/SAC: CPT | Performed by: ANESTHESIOLOGY

## 2024-07-26 RX ORDER — LIDOCAINE HYDROCHLORIDE 20 MG/ML
INJECTION, SOLUTION INFILTRATION; PERINEURAL
Status: DISCONTINUED | OUTPATIENT
Start: 2024-07-26 | End: 2024-07-26 | Stop reason: HOSPADM

## 2024-07-26 RX ORDER — METHYLPREDNISOLONE ACETATE 40 MG/ML
INJECTION, SUSPENSION INTRA-ARTICULAR; INTRALESIONAL; INTRAMUSCULAR; SOFT TISSUE
Status: DISCONTINUED | OUTPATIENT
Start: 2024-07-26 | End: 2024-07-26 | Stop reason: HOSPADM

## 2024-07-26 RX ORDER — TRIAMCINOLONE ACETONIDE 40 MG/ML
INJECTION, SUSPENSION INTRA-ARTICULAR; INTRAMUSCULAR
Status: DISCONTINUED | OUTPATIENT
Start: 2024-07-26 | End: 2024-07-26 | Stop reason: HOSPADM

## 2024-07-26 RX ORDER — ALPRAZOLAM 0.25 MG/1
0.5 TABLET, ORALLY DISINTEGRATING ORAL
Status: DISCONTINUED | OUTPATIENT
Start: 2024-07-26 | End: 2024-07-26 | Stop reason: HOSPADM

## 2024-07-26 NOTE — OP NOTE
Lumbar Interlaminar Epidural Steroid Injection under Fluoroscopic Guidance    The procedure, risks, benefits, and options were discussed with the patient. There are no contraindications to the procedure. The patent expressed understanding and agreed to the procedure. Informed written consent was obtained prior to the start of the procedure and can be found in the patient's chart.    PATIENT NAME: Kayla Gray   MRN: [id number]     DATE OF PROCEDURE: 07/26/2024    PROCEDURE: Lumbar Interlaminar Epidural Steroid Injection L5/S1 under Fluoroscopic Guidance    PRE-OP DIAGNOSIS: Lumbar radiculitis [M54.16] Lumbar radiculopathy [M54.16]    POST-OP DIAGNOSIS: Same    PHYSICIAN: Monique Josue MD    MEDICATIONS INJECTED: triamcinolone 80 mg  with 4cc of Lidocaine 1% MPF and preservative free normal saline    LOCAL ANESTHETIC INJECTED: Xylocaine 2%     SEDATION: None    ESTIMATED BLOOD LOSS: None    COMPLICATIONS: None    TECHNIQUE: Time-out was performed to identify the patient and procedure to be performed. With the patient laying in a prone position, the surgical area was prepped and draped in the usual sterile fashion using ChloraPrep and a fenestrated drape. The level was determined under fluoroscopy guidance. Skin anesthesia was achieved by injecting Lidocaine 2% over the injection site. The interlaminar space was then approached with a 20 gauge,  3.5 inch Tuohy needle that was introduced under fluoroscopic guidance in the AP, lateral and/or contralateral oblique imaging. Once the Ligamentum flavum was encountered loss of resistance to air was used to enter the epidural space. With positive loss of resistance and negative aspiration for CSF or Blood, contrast dye Omnipaque (300mg/mL) was injected to confirm placement and there was no vascular runoff. 5 mL of the medication mixture listed above was injected slowly. Displacement of the radio opaque contrast after injection of the medication confirmed that the medication  went into the area of the epidural space. The needles were removed and bleeding was nil. A sterile dressing was applied. No specimens collected. The patient tolerated the procedure well.       The patient was monitored after the procedure in the recovery area. They were given post-procedure and discharge instructions to follow at home. The patient was discharged in a stable condition.    Monique Josue MD

## 2024-07-26 NOTE — DISCHARGE SUMMARY
Discharge Note  Short Stay    Admit Date: 7/26/2024    Attending Physician: Monique Josue    Discharge Physician: Monique Josue    Discharge Date: 7/26/2024 10:46 AM    Procedure(s) (LRB):  LUMBAR EPIDURAL STEROID INJECTION (L5-S1) (ORAL SEDATION) (PLAVIX) (Right)    Final Diagnosis: Lumbar radiculitis [M54.16]    Disposition: Home or self care    Patient Instructions:   Current Discharge Medication List        CONTINUE these medications which have NOT CHANGED    Details   albuterol (PROVENTIL/VENTOLIN HFA) 90 mcg/actuation inhaler TAKE 2 PUFFS BY MOUTH EVERY 6 HOURS AS NEEDED FOR WHEEZE OR SHORTNESS OF BREATH  Qty: 8 g, Refills: 9    Associated Diagnoses: Mild intermittent asthma without complication      atorvastatin (LIPITOR) 20 MG tablet Take 20 mg by mouth every evening.      conjugated estrogens (PREMARIN) vaginal cream Place 1 g vaginally once daily.  Qty: 3 applicator, Refills: 11      esomeprazole (NEXIUM) 40 MG capsule Take 1 capsule (40 mg total) by mouth once daily.  Qty: 90 capsule, Refills: 3    Associated Diagnoses: Mild intermittent asthma without complication      hydroCHLOROthiazide (HYDRODIURIL) 12.5 MG Tab Take 12.5 mg by mouth once daily.      montelukast (SINGULAIR) 10 mg tablet TAKE 1 TABLET BY MOUTH EVERY DAY IN THE EVENING  Qty: 90 tablet, Refills: 3    Associated Diagnoses: Mild intermittent asthma without complication      potassium chloride SA (K-DUR,KLOR-CON) 20 MEQ tablet Take by mouth.      pregabalin (LYRICA) 50 MG capsule Take 1 capsule (50 mg total) by mouth 2 (two) times daily.  Qty: 60 capsule, Refills: 2      clopidogreL (PLAVIX) 75 mg tablet Take 75 mg by mouth once daily.             Discharge Diagnosis: Lumbar radiculitis [M54.16]  Condition on Discharge: Stable with no complications to procedure   Diet on Discharge: Same as before.  Activity: as per instruction sheet.  Discharge to: Home with a responsible adult.  Follow up: 2-4 weeks       Please call my office or pager at  [phone] if experienced any weakness or loss of sensation, fever > 101.5, pain uncontrolled with oral medications, persistent nausea/vomiting/or diarrhea, redness or drainage from the incisions, or any other worrisome concerns. If physician on call was not reached or could not communicate with our office for any reason please go to the nearest emergency department.     Monique Josue  07/26/2024

## 2024-08-13 ENCOUNTER — PATIENT MESSAGE (OUTPATIENT)
Dept: PAIN MEDICINE | Facility: CLINIC | Age: 59
End: 2024-08-13
Payer: COMMERCIAL

## 2024-08-19 ENCOUNTER — OFFICE VISIT (OUTPATIENT)
Dept: PAIN MEDICINE | Facility: CLINIC | Age: 59
End: 2024-08-19
Payer: COMMERCIAL

## 2024-08-19 DIAGNOSIS — M54.16 RIGHT LUMBAR RADICULOPATHY: ICD-10-CM

## 2024-08-19 DIAGNOSIS — M54.16 LUMBAR RADICULITIS: Primary | ICD-10-CM

## 2024-08-19 DIAGNOSIS — M51.26 LUMBAR HERNIATED DISC: ICD-10-CM

## 2024-08-19 PROCEDURE — 1160F RVW MEDS BY RX/DR IN RCRD: CPT | Mod: CPTII,S$GLB,, | Performed by: ANESTHESIOLOGY

## 2024-08-19 PROCEDURE — 99213 OFFICE O/P EST LOW 20 MIN: CPT | Mod: S$GLB,,, | Performed by: ANESTHESIOLOGY

## 2024-08-19 PROCEDURE — 99999 PR PBB SHADOW E&M-EST. PATIENT-LVL I: CPT | Mod: PBBFAC,,, | Performed by: ANESTHESIOLOGY

## 2024-08-19 PROCEDURE — 3044F HG A1C LEVEL LT 7.0%: CPT | Mod: CPTII,S$GLB,, | Performed by: ANESTHESIOLOGY

## 2024-08-19 PROCEDURE — 1159F MED LIST DOCD IN RCRD: CPT | Mod: CPTII,S$GLB,, | Performed by: ANESTHESIOLOGY

## 2024-08-19 NOTE — PROGRESS NOTES
EST patient evaluation  [facility]    Kayla Gray  : 1965  Date: 2024     CHIEF COMPLAINT:  No chief complaint on file.    Referring Physician: No ref. provider found  Primary Care Physician: Shell Mckee NP    HPI:  This is a 59 y.o. female with a chief complaint of No chief complaint on file.  . The patient has Past medical history/Past surgical history of asthma, hyperlipidemia, Long term blood thinner, avascular necrosis of the right hip    Patient was evaluated and referred by primary care provider for low back pain  X-ray lumbar spine obtained in 2024 showed no abnormal flexion-extension movement with a disc space narrowing at L5-S1 and facet arthropathy.    No previous MRI lumbar spine  She was started on Lyrica 25 mg b.i.d. in 2024 in addition to Flexeril as needed.  She was seen by orthopedic surgeon 2024 who rule out her right hip pathology causing her symptoms by performing right hip MRI.  Low back pain started in December, she started to see a chiropractor in February and this is when all the hip pain started. Numbness to lower extremity/foot started about 3 weeks ago.     Interval History 2024:  Kayla Gray is here for follow up visit after HER INITIAL EVALUATION IN .  She is here to discuss her MRI.  MRI bilateral hip  showed no sign of avascular necrosis.    X-ray bilateral hip showed mild degenerative changes  MRI lumbar spine showed At L5-S1 right sub articular foramina disc extrusion contributing to right lateral recess stenosis with disc approximating the exiting right S1 nerve root.  She Could not tolerate 1 dose of Lyrica 25 mg q.h.s.  Current pain score: 7/10    Interval History 2024:  Kayla Gray is here for follow up visit after right L5 and S1 transforaminal epidural steroid injection in 2024, patient reported 50% improvement in pain and  functionality in the first week only after injection, however  she reported 100% relief in her right lower extremity numbness.  She continues to take Lyrica as prescribed  Current pain score: 6/10    Interval History 08/19/2024:  Kayla Gray is here for procedure follow up visit after lumbar epidural steroid injection at L5-S1, patient reported 95% relief after injection and improvement in functionality.  Current pain score: 0/10    Diabetic: No    Anticogualtion drugs: Plavix     Allergy To Iodine: No    Currently on Antibiotic: No    Current Description of Pain Symptoms:    History of Recent Fall or Trauma: No   Onset: Chronic, started December 2023, right hip pain started 04/2024 and leg and feet for 05/2024  Pain Location:  Right buttock, right hip, no groin pain, no tenderness over the SI joint or GTB  Radiates/associated symptoms: down RLE to foot, no left LE  The pain is described as aching, numbing, and stabbing(Right leg is numb from knee down to foot: Improving)  Exacerbating factors: Sitting, Laying, riding in car.  Mitigating factors standing and moving.   Symptoms interfere with daily activity, sleeping.   The patient feels like symptoms have beenImproving  on physical exam she has 5/5 muscle power in bilateral lower extremities    Pain score:   Best: 3/10  Worst: 10/10    Current pain medications:    pregabalin (LYRICA) 50 MG capsule,  b.I.d.    Current Narcotics/Opioid /benzo Medications:  Opioids- None  Benzodiazepines: No    UDS:  NA    PDMP:  Reviewed and consistent with medication use as prescribed.     Previous Chronic Pain Treatment History:  Physical Therapy/HEP/Physician Lead Exercise Program:  [x]Chiropractor from April 2024 to July 20, 2024, + 10 sessions- with no relief   I  Non-interventional Pain Therapy:  [x]Chiropractor from April 2024 to May 20, 2024, + 10 sessions  []Acupuncture/Dry needle.  []TENS unit.  []Heat/ICE.  []Back Brace.    Medications previously  "tried:  NSAIDs: None Allergic to ASA, her cardiology place her on Plavix  Topical Agent: No  TCA/SSRI/SNRI: None  Anti-convulsants: Lyrica   Muscle Relaxants: Flexeril: tachycardia    Opioids- Oxycodone with Acetaminophen (Percocet).    Interventional Pain Procedures:  right L5 and S1 transforaminal epidural steroid injection  - 50% pain relief for pain, and 100% relief for the numbness  2024 LESI at L5-S1 >90%  Improvement in pain and functionality.    Previous spine/Relevant joint surgery:  none  Surgical History:   has a past surgical history that includes  section, classic; [place] tooth extraction (2017); Bilateral salpingoophorectomy (2017); Abdominal mass resection (2017); pr removal of ovary/tube(s); Hysterectomy (2017); Transforaminal epidural injection of steroid (Right, 2024); and Epidural steroid injection into lumbar spine (N/A, 2024).  Medical History:   has a past medical history of Asthma, Avascular necrosis of bone of right hip, Esophageal dilatation, GERD (gastroesophageal reflux disease), Hiatal hernia, and Yeast infection.  Family History:  family history includes Breast cancer in her paternal grandmother; Cancer in her maternal grandfather and paternal grandmother; Heart disease in her father and paternal grandmother.  Allergies:  Asa [aspirin] and Codeine   Social History/SUBSTANCE ABUSE HISTORY:  Personal history of substance abuse: No   reports that she has never smoked. She has never used smokeless tobacco. She reports that she does not drink alcohol and does not use drugs.  LABS:  CBC  Lab Results   Component Value Date    WBC 8.82 2024    HGB 13.7 2024    HCT 40.8 2024     Coagulation Profile   Lab Results   Component Value Date     2024     No results found for: "PT", "PTT", "INR"  CMP:  BMP  Lab Results   Component Value Date     2024    K 4.2 2024     2024    CO2 27 2024 "    BUN 14 05/17/2024    CREATININE 0.8 05/17/2024    CALCIUM 10.0 05/17/2024    ANIONGAP 11 05/17/2024    EGFRNORACEVR >60 05/17/2024     Lab Results   Component Value Date    ALT 41 05/17/2024    AST 24 05/17/2024    ALKPHOS 82 05/17/2024    BILITOT 0.4 05/17/2024     HGBA1C:  Lab Results   Component Value Date    HGBA1C 5.9 (H) 05/17/2024     ROS:    Review of Systems   GENERAL:  No weight loss, malaise or fevers.  HEENT:   No recent changes in vision or hearing  NECK:  Negative for lumps, no difficulty with swallowing.  RESPIRATORY:  Negative for cough, wheezing or shortness of breath, patient denies any recent URI.  CARDIOVASCULAR:  Negative for chest pain or palpitations.  GI:  Negative for abdominal discomfort, blood in stools or black stools or change in bowel habits.  MUSCULOSKELETAL:  See HPI.  SKIN:  Negative for lesions, rash, and itching.  PSYCH:  No mood disorder or recent psychosocial stressors.   HEMATOLOGY/LYMPHOLOGY:  See the blood thinner sectioned in HPI.  NEURO:  See HPI  All other reviewed and negative other than HPI.    PHYSICAL EXAM:  VITALS: LMP 03/24/2017   There is no height or weight on file to calculate BMI.  GENERAL: Well appearing, in no acute distress, alert and oriented x3, answers questions appropriately.   PSYCH: Flat affect.  SKIN: Skin color, texture, turgor normal, no rashes or lesions.  HEAD/FACE:  Normocephalic, atraumatic. Cranial nerves grossly intact.  CV: Regular rate  PULM: No evidence of respiratory difficulty, symmetric chest rise.  GI:  Soft and non-Distended.  BACK/SIJ/HIP:  Lumbar Spine Exam:       Inspection: No erythema, bruising.       Palpation: (-) TTP of lumbar paraspinals bilaterally      ROM:  Limited in flexion, extension, lateral bending.       (-) Facet loading bilaterally      (+) Straight Leg Raise, right lower extremity++, very significant      (-) negative tenderness for right posterior superior iliac spine.  Hip Exam:      Inspection: No gross  deformity or apparent leg length discrepancy      Palpation:  No TTP to bilateral greater trochanteric bursas.       ROM:  Positive limitation due to pain on external rotation of the right hip in compared to the left  Neurologic Exam:     Alert. Speech is fluent and appropriate.     Strength: 5/5 in bilateral hip flexion and knee extension     Sensation:  Grossly intact to light touch in bilateral lower extremities     Tone: No abnormality appreciated in bilateral lower extremities    GAIT:  Antalgic gait, unsteady gait.    DIAGNOSTIC STUDIES AND MEDICAL RECORDS REVIEW:  XR LUMBAR SPINE AP AND LAT WITH FLEX/EXT   Alignment: Mild dextroscoliotic curvature.  No abnormal movement with flexion or extension to suggest ligamentous instability.  Vertebrae: Vertebral body heights are maintained.  No suspicious appearing lytic or blastic lesions.  Discs and facets: Disc space narrowing at L5-S1. Facet arthropathy at L5-S1.      XR HIPS BILATERAL 2 VIEW INCL AP PELVIS   Bone mineralization is maintained.  There are mild degenerate changes of the bilateral sacroiliac joints, hip joints and pubic symphysis.  The femoral heads demonstrate a normal spherical contour without evidence for   avascular necrosis.  No fracture or dislocation.    MR I bilateral hip was no sign of avascular necrosis    MRI lumbar spine  showed At L5-S1 right sub articular foramina disc extrusion contributing to right lateral recess stenosis with disc approximating the exiting right S1 nerve root.    ASSESSMENT:  Kayla Gray is a 59 y.o. female with the following diagnoses based on history, exam, and imagin. Lumbar radiculitis    2. Right lumbar radiculopathy    3. Lumbar herniated disc  --------------------------------------------------------  This is a pleasant 59 y.o. female patient with PMH/PSH of asthma, hyperlipidemia, long-term anticoagulation on Plavix presenting with right hip pain and right lower extremity  pain  andnumbness that has been worsening since December, 2023, she has completed multiple sessions of chiropractor sessions exercise between February 2024 and April, 2024 with no significant relief,  she has a MRI lumbar spine 2024  showed At L5-S1 right sub articular foramina disc extrusion contributing to right lateral recess stenosis with disc approximating the exiting right S1 nerve root Which correlates with her symptoms,  she had a right L5 and S1 transforaminal epidural steroid injection with relief her numbness but did not effect her pain significantly,  followed by lumbar epidural steroid injection at right-sided L5-S1, patient reported significant improvement in pain functionality, she is very happy and satisfied with the current pain management plan.    Treatment Plan:    Diagnostics/Referrals:  patient had neurosurgery evaluation; recommended interventional procedure.    Medications:  NSAIDs:  Can not have due to blood thinners.  Topical Agent: No  TCA/SSRI/SNRI: None  Anti-convulsants: Lyrica - per PCP  Muscle Relaxants: None  Opioids: None    Interventional Therapy: may consider repeating  lumbar epidural steroid injection at L5-S1- Right sided   Sedation:  oral sedation  Clearance to stop Blood thinner: Plavix 7 days.    Regarding the above interventions, the patient has been educated regarding the risks (including bleeding, infection, increased pain, nerve damage, or allergic reaction), benefits, and alternatives. The patient states she understands and is eager to proceed.     Continue Physical Rehabilitation: physical therapy    Patient Education: Counseled patient regarding the importance of activity modification, I have stressed the importance of physical activity and a home exercise plan to help with pain and improve health.    Follow-up: RTC   six-month    May consider: LESI( right-sided)    Monique Josue MD  Anesthesiologist  Interventional Pain Medicine  08/19/2024    Disclaimer:  This note was  prepared using voice recognition system and is likely to have sound alike errors that may have been overlooked even after proof reading.  Please call me with any questions.

## 2024-08-19 NOTE — PROGRESS NOTES
EST patient evaluation  [facility]    Kayla Gray  : 1965  Date: 2024     CHIEF COMPLAINT:  No chief complaint on file.    Referring Physician: No ref. provider found  Primary Care Physician: Shell Mckee NP    HPI:  This is a 59 y.o. female with a chief complaint of No chief complaint on file.  . The patient has Past medical history/Past surgical history of asthma, hyperlipidemia, Long term blood thinner, avascular necrosis of the right hip    Patient was evaluated and referred by primary care provider for low back pain  X-ray lumbar spine obtained in 2024 showed no abnormal flexion-extension movement with a disc space narrowing at L5-S1 and facet arthropathy.    No previous MRI lumbar spine  She was started on Lyrica 25 mg b.i.d. in 2024 in addition to Flexeril as needed.  She was seen by orthopedic surgeon 2024 who rule out her right hip pathology causing her symptoms by performing right hip MRI.  Low back pain started in December, she started to see a chiropractor in February and this is when all the hip pain started. Numbness to lower extremity/foot started about 3 weeks ago.     Interval History 2024:  Kayla Gray is here for follow up visit after HER INITIAL EVALUATION IN .  She is here to discuss her MRI.  MRI bilateral hip  showed no sign of avascular necrosis.    X-ray bilateral hip showed mild degenerative changes  MRI lumbar spine showed At L5-S1 right sub articular foramina disc extrusion contributing to right lateral recess stenosis with disc approximating the exiting right S1 nerve root.  She Could not tolerate 1 dose of Lyrica 25 mg q.h.s.  Current pain score: 7/10    Interval History 2024:  Kayla Gray is here for follow up visit after right L5 and S1 transforaminal epidural steroid injection in 2024, patient reported 50% improvement in pain and  functionality in the first week only after injection, however  she reported 100% relief in her right lower extremity numbness.  She continues to take Lyrica as prescribed  Current pain score: 6/10    Interval History 08/19/2024:  Kayla Gray is here for  procedure follow up visit after lumbar epidural steroid injection at L5-S1, patient reported    Current pain score: ***/10      Diabetic: No    Anticogualtion drugs: Plavix     Allergy To Iodine: No    Currently on Antibiotic: No    Current Description of Pain Symptoms:    History of Recent Fall or Trauma: No   Onset: Chronic, started December 2023, right hip pain started 04/2024 and leg and feet for 05/2024  Pain Location:  Right buttock, right hip, no groin pain, no tenderness over the SI joint or GTB  Radiates/associated symptoms: down RLE to foot, no left LE  The pain is described as aching, numbing, and stabbing(Right leg is numb from knee down to foot: Improving)  Exacerbating factors: Sitting, Laying, riding in car.  Mitigating factors standing and moving.   Symptoms interfere with daily activity, sleeping.   The patient feels like symptoms have been unchanged  on physical exam she has 5/5 muscle power in bilateral lower extremities    Pain score:   Best: 3/10  Worst: 10/10    Current pain medications:    pregabalin (LYRICA) 25 MG capsule,  b.I.d.    Current Narcotics/Opioid /benzo Medications:  Opioids- None  Benzodiazepines: No    UDS:  NA    PDMP:  Reviewed and consistent with medication use as prescribed.     Previous Chronic Pain Treatment History:  Physical Therapy/HEP/Physician Lead Exercise Program:  [x]Chiropractor from April 2024 to July 20, 2024, + 10 sessions- with no relief    Non-interventional Pain Therapy:  [x]Chiropractor from April 2024 to May 20, 2024, + 10 sessions  []Acupuncture/Dry needle.  []TENS unit.  []Heat/ICE.  []Back Brace.    Medications previously tried:  NSAIDs: None Allergic to ASA, her cardiology place her on  "Plavix  Topical Agent: No  TCA/SSRI/SNRI: None  Anti-convulsants: Lyrica   Muscle Relaxants: Flexeril: tachycardia    Opioids- Oxycodone with Acetaminophen (Percocet).    Interventional Pain Procedures:  right L5 and S1 transforaminal epidural steroid injection  - 50% pain relief for pain, and 100% relief for the numbness    Previous spine/Relevant joint surgery:  none  Surgical History:   has a past surgical history that includes  section, classic; [place] tooth extraction (2017); Bilateral salpingoophorectomy (2017); Abdominal mass resection (2017); pr removal of ovary/tube(s); Hysterectomy (2017); Transforaminal epidural injection of steroid (Right, 2024); and Epidural steroid injection into lumbar spine (N/A, 2024).  Medical History:   has a past medical history of Asthma, Avascular necrosis of bone of right hip, Esophageal dilatation, GERD (gastroesophageal reflux disease), Hiatal hernia, and Yeast infection.  Family History:  family history includes Breast cancer in her paternal grandmother; Cancer in her maternal grandfather and paternal grandmother; Heart disease in her father and paternal grandmother.  Allergies:  Asa [aspirin] and Codeine   Social History/SUBSTANCE ABUSE HISTORY:  Personal history of substance abuse: No   reports that she has never smoked. She has never used smokeless tobacco. She reports that she does not drink alcohol and does not use drugs.  LABS:  CBC  Lab Results   Component Value Date    WBC 8.82 2024    HGB 13.7 2024    HCT 40.8 2024     Coagulation Profile   Lab Results   Component Value Date     2024     No results found for: "PT", "PTT", "INR"  CMP:  BMP  Lab Results   Component Value Date     2024    K 4.2 2024     2024    CO2 27 2024    BUN 14 2024    CREATININE 0.8 2024    CALCIUM 10.0 2024    ANIONGAP 11 2024    EGFRNORACEVR >60 2024 "     Lab Results   Component Value Date    ALT 41 05/17/2024    AST 24 05/17/2024    ALKPHOS 82 05/17/2024    BILITOT 0.4 05/17/2024     HGBA1C:  Lab Results   Component Value Date    HGBA1C 5.9 (H) 05/17/2024     ROS:    Review of Systems   GENERAL:  No weight loss, malaise or fevers.  HEENT:   No recent changes in vision or hearing  NECK:  Negative for lumps, no difficulty with swallowing.  RESPIRATORY:  Negative for cough, wheezing or shortness of breath, patient denies any recent URI.  CARDIOVASCULAR:  Negative for chest pain or palpitations.  GI:  Negative for abdominal discomfort, blood in stools or black stools or change in bowel habits.  MUSCULOSKELETAL:  See HPI.  SKIN:  Negative for lesions, rash, and itching.  PSYCH:  No mood disorder or recent psychosocial stressors.   HEMATOLOGY/LYMPHOLOGY:  See the blood thinner sectioned in HPI.  NEURO:  See HPI  All other reviewed and negative other than HPI.    PHYSICAL EXAM:  VITALS: [facility] 03/24/2017   There is no height or weight on file to calculate BMI.  GENERAL: Well appearing, in no acute distress, alert and oriented x3, answers questions appropriately.   PSYCH: Flat affect.  SKIN: Skin color, texture, turgor normal, no rashes or lesions.  HEAD/FACE:  Normocephalic, atraumatic. Cranial nerves grossly intact.  CV: Regular rate  PULM: No evidence of respiratory difficulty, symmetric chest rise.  GI:  Soft and non-Distended.  BACK/SIJ/HIP:  Lumbar Spine Exam:       Inspection: No erythema, bruising.       Palpation: (-) TTP of lumbar paraspinals bilaterally      ROM:  Limited in flexion, extension, lateral bending.       (-) Facet loading bilaterally      (+) Straight Leg Raise, right lower extremity++, very significant      (-) negative tenderness for right posterior superior iliac spine.  Hip Exam:      Inspection: No gross deformity or apparent leg length discrepancy      Palpation:  No TTP to bilateral greater trochanteric bursas.       ROM:  Positive limitation  due to pain on external rotation of the right hip in compared to the left  Neurologic Exam:     Alert. Speech is fluent and appropriate.     Strength: 5/5 in bilateral hip flexion and knee extension     Sensation:  Grossly intact to light touch in bilateral lower extremities     Tone: No abnormality appreciated in bilateral lower extremities    GAIT:  Antalgic gait, unsteady gait.    DIAGNOSTIC STUDIES AND MEDICAL RECORDS REVIEW:  XR LUMBAR SPINE AP AND LAT WITH FLEX/EXT 2024    Alignment: Mild dextroscoliotic curvature.  No abnormal movement with flexion or extension to suggest ligamentous instability.     Vertebrae: Vertebral body heights are maintained.  No suspicious appearing lytic or blastic lesions.     Discs and facets: Disc space narrowing at L5-S1. Facet arthropathy at L5-S1.      XR HIPS BILATERAL 2 VIEW INCL AP PELVIS 2024    Bone mineralization is maintained.  There are mild degenerate changes of the bilateral sacroiliac joints, hip joints and pubic symphysis.  The femoral heads demonstrate a normal spherical contour without evidence for   avascular necrosis.  No fracture or dislocation.    MR I bilateral hip was no sign of avascular necrosis    MRI lumbar spine 2024 showed At L5-S1 right sub articular foramina disc extrusion contributing to right lateral recess stenosis with disc approximating the exiting right S1 nerve root.    ASSESSMENT:  Kayla Gray is a 59 y.o. female with the following diagnoses based on history, exam, and imaging:  There are no diagnoses linked to this encounter.    --------------------------------------------------------  This is a pleasant 59 y.o. female patient with PMH/PSH of asthma, hyperlipidemia, long-term anticoagulation on Plavix presenting with right hip pain and right lower extremity  pain andnumbness that has been worsening since December, 2023, she has completed multiple sessions of chiropractor sessions exercise between February 2024 and April, 2024  with no significant relief,  she has a MRI lumbar spine 2024  showed At L5-S1 right sub articular foramina disc extrusion contributing to right lateral recess stenosis with disc approximating the exiting right S1 nerve root Which correlates with her symptoms,  she had a right L5 and S1 transforaminal epidural steroid injection with  relief her numbness but did not effect her pain significantly  Treatment Plan:    Diagnostics/Referrals:  she would like to have a neurosurgical referral as a 2nd opinion    Medications:    NSAIDs:  Can not have due to blood thinners  Topical Agent: No  TCA/SSRI/SNRI: None  Anti-convulsants: Lyrica - per PCP  Muscle Relaxants: None  Opioids: None    Interventional Therapy: Please schedule for  lumbar epidural steroid injection at L5-S1- Right sided   Sedation:  oral sedation  Clearance to stop Blood thinner: Plavix 7 days.    Regarding the above interventions, the patient has been educated regarding the risks (including bleeding, infection, increased pain, nerve damage, or allergic reaction), benefits, and alternatives. The patient states she understands and is eager to proceed.    Physical Rehabilitation: physical therapy    Patient Education: Counseled patient regarding the importance of activity modification, I have stressed the importance of physical activity and a home exercise plan to help with pain and improve health.    Follow-up: RTC  after injection    May consider: LESI( right-sided),  right SI joint injection.      Monique Josue MD  Anesthesiologist  Interventional Pain Medicine  08/19/2024    Disclaimer:  This note was prepared using voice recognition system and is likely to have sound alike errors that may have been overlooked even after proof reading.  Please call me with any questions.

## 2024-10-15 ENCOUNTER — PATIENT MESSAGE (OUTPATIENT)
Dept: FAMILY MEDICINE | Facility: CLINIC | Age: 59
End: 2024-10-15
Payer: COMMERCIAL

## 2024-10-16 DIAGNOSIS — Z12.31 OTHER SCREENING MAMMOGRAM: ICD-10-CM

## 2024-10-28 ENCOUNTER — HOSPITAL ENCOUNTER (OUTPATIENT)
Dept: RADIOLOGY | Facility: HOSPITAL | Age: 59
Discharge: HOME | End: 2024-10-28
Attending: NURSE PRACTITIONER
Payer: COMMERCIAL

## 2024-10-28 DIAGNOSIS — Z78.0 POST-MENOPAUSAL: ICD-10-CM

## 2024-10-28 PROCEDURE — 77080 DXA BONE DENSITY AXIAL: CPT | Mod: 26,,, | Performed by: RADIOLOGY

## 2024-10-28 PROCEDURE — 77080 DXA BONE DENSITY AXIAL: CPT | Mod: TC

## 2024-10-31 ENCOUNTER — HOSPITAL ENCOUNTER (OUTPATIENT)
Dept: RADIOLOGY | Facility: HOSPITAL | Age: 59
Discharge: HOME | End: 2024-10-31
Attending: NURSE PRACTITIONER
Payer: COMMERCIAL

## 2024-10-31 VITALS — BODY MASS INDEX: 24.92 KG/M2 | HEIGHT: 61 IN | WEIGHT: 132 LBS

## 2024-10-31 DIAGNOSIS — Z12.31 OTHER SCREENING MAMMOGRAM: ICD-10-CM

## 2024-10-31 PROCEDURE — 77063 BREAST TOMOSYNTHESIS BI: CPT | Mod: 26,,, | Performed by: RADIOLOGY

## 2024-10-31 PROCEDURE — 77063 BREAST TOMOSYNTHESIS BI: CPT | Mod: TC

## 2024-10-31 PROCEDURE — 77067 SCR MAMMO BI INCL CAD: CPT | Mod: TC

## 2024-10-31 PROCEDURE — 77067 SCR MAMMO BI INCL CAD: CPT | Mod: 26,,, | Performed by: RADIOLOGY

## 2024-11-11 ENCOUNTER — PATIENT MESSAGE (OUTPATIENT)
Dept: INTERNAL MEDICINE | Facility: CLINIC | Age: 59
End: 2024-11-11
Payer: COMMERCIAL

## 2024-11-19 ENCOUNTER — OFFICE VISIT (OUTPATIENT)
Dept: OBSTETRICS AND GYNECOLOGY | Facility: CLINIC | Age: 59
End: 2024-11-19
Payer: COMMERCIAL

## 2024-11-19 VITALS
OXYGEN SATURATION: 98 % | HEIGHT: 61 IN | BODY MASS INDEX: 25.12 KG/M2 | SYSTOLIC BLOOD PRESSURE: 134 MMHG | HEART RATE: 74 BPM | RESPIRATION RATE: 18 BRPM | DIASTOLIC BLOOD PRESSURE: 78 MMHG | WEIGHT: 133.06 LBS

## 2024-11-19 DIAGNOSIS — Z12.31 BREAST CANCER SCREENING BY MAMMOGRAM: ICD-10-CM

## 2024-11-19 DIAGNOSIS — Z90.710 HISTORY OF HYSTERECTOMY: ICD-10-CM

## 2024-11-19 DIAGNOSIS — Z01.419 WELL WOMAN EXAM WITH ROUTINE GYNECOLOGICAL EXAM: Primary | ICD-10-CM

## 2024-11-19 PROCEDURE — 99396 PREV VISIT EST AGE 40-64: CPT | Mod: S$GLB,,, | Performed by: OBSTETRICS & GYNECOLOGY

## 2024-11-19 PROCEDURE — 3044F HG A1C LEVEL LT 7.0%: CPT | Mod: CPTII,S$GLB,, | Performed by: OBSTETRICS & GYNECOLOGY

## 2024-11-19 PROCEDURE — 99999 PR PBB SHADOW E&M-EST. PATIENT-LVL IV: CPT | Mod: PBBFAC,,, | Performed by: OBSTETRICS & GYNECOLOGY

## 2024-11-19 PROCEDURE — 3075F SYST BP GE 130 - 139MM HG: CPT | Mod: CPTII,S$GLB,, | Performed by: OBSTETRICS & GYNECOLOGY

## 2024-11-19 PROCEDURE — 1159F MED LIST DOCD IN RCRD: CPT | Mod: CPTII,S$GLB,, | Performed by: OBSTETRICS & GYNECOLOGY

## 2024-11-19 PROCEDURE — 3008F BODY MASS INDEX DOCD: CPT | Mod: CPTII,S$GLB,, | Performed by: OBSTETRICS & GYNECOLOGY

## 2024-11-19 PROCEDURE — 3078F DIAST BP <80 MM HG: CPT | Mod: CPTII,S$GLB,, | Performed by: OBSTETRICS & GYNECOLOGY

## 2024-11-19 RX ORDER — ESTRADIOL 0.1 MG/G
1 CREAM VAGINAL DAILY
Qty: 42.5 G | Refills: 6 | Status: SHIPPED | OUTPATIENT
Start: 2024-11-19 | End: 2024-11-20 | Stop reason: SDUPTHER

## 2024-11-19 NOTE — PROGRESS NOTES
Subjective:    Patient ID: Kayla Gray is a 59 y.o. y.o. female.     Chief Complaint: Annual Well Woman Exam     History of Present Illness:  Kayla presents today for Annual Well Woman exam. She describes her menses as  absent .She denies pelvic pain.  She denies breast tenderness, masses, nipple discharge. She denies difficulty with urination or bowel movements. She denies menopausal symptoms such as hotflashes, vaginal dryness, and night sweats. She denies bloating, early satiety, or weight changes. She is sexually active. Contraception is by no method.    The following portions of the patient's history were reviewed and updated as appropriate: allergies, current medications, past family history, past medical history, past social history, past surgical history and problem list.      Menstrual History:   Patient's last menstrual period was 2017..     OB History          5    Para   4    Term   4            AB   1    Living             SAB   1    IAB        Ectopic        Multiple        Live Births                     The following portions of the patient's history were reviewed and updated as appropriate: allergies, current medications, past family history, past medical history, past social history, past surgical history and problem list.        ROS:     Review of Systems   Constitutional:  Negative for activity change, appetite change, chills, diaphoresis, fatigue, fever and unexpected weight change.   HENT:  Negative for mouth sores and tinnitus.    Eyes:  Negative for discharge and visual disturbance.   Respiratory:  Negative for cough, shortness of breath and wheezing.    Cardiovascular:  Negative for chest pain, palpitations and leg swelling.   Gastrointestinal:  Negative for abdominal pain, bloating, blood in stool, constipation, diarrhea, nausea and vomiting.   Endocrine: Negative for diabetes, hair loss, hot flashes, hyperthyroidism and hypothyroidism.   Genitourinary:  " Negative for dysuria, flank pain, frequency, genital sores, hematuria, urgency, vaginal bleeding, vaginal discharge, vaginal pain, postcoital bleeding and vaginal odor.   Musculoskeletal:  Negative for back pain, joint swelling and myalgias.   Integumentary:  Negative for rash, acne, breast mass, nipple discharge and breast skin changes.   Neurological:  Negative for seizures, syncope, numbness and headaches.   Hematological:  Negative for adenopathy. Does not bruise/bleed easily.   Psychiatric/Behavioral:  Negative for depression and sleep disturbance. The patient is not nervous/anxious.    Breast: Negative for mass, mastodynia, nipple discharge and skin changes      Objective:    Vital Signs:  Vitals:    11/19/24 0751   BP: 134/78   Pulse: 74   Resp: 18   SpO2: 98%   Weight: 60.3 kg (133 lb 0.8 oz)   Height: 5' 1" (1.549 m)         Physical Exam:  General:  alert, cooperative, appears stated age   Skin:  Skin color, texture, turgor normal. No rashes or lesions   HEENT:  conjunctivae/corneas clear. PERRL.   Neck: supple, trachea midline, no adenopathy or thyromegally   Respiratory:  clear to auscultation bilaterally   Heart:  regular rate and rhythm, S1, S2 normal, no murmur, click, rub or gallop   Breasts:  no discharge, erythema, or tenderness   Abdomen:  normal findings: bowel sounds normal, no masses palpable, no organomegaly and soft, non-tender   Pelvis: External genitalia: normal general appearance  Urinary system: urethral meatus normal, bladder nontender  Vaginal: atrophic mucosa  Cervix: absent, removed surgically  Uterus: absent, removed surgically  Adnexa: non palpable   Extremities: Normal ROM; no edema, no cyanosis   Neurologial: Normal strength and tone. No focal numbness or weakness. Reflexes 2+ and equal.   Psychiatric: normal mood, speech, dress, and thought processes         Assessment:       Healthy female exam.     1. Well woman exam with routine gynecological exam    2. Breast cancer " screening by mammogram    3. History of hysterectomy          Plan:       pap smear deferred per guidelines    MMG UTD  Discussed weight bearing exercise, calcium, and vitamin d for osteoporosis prevention  Colonoscopy per Dr. Bello  Refill estrace cream  RTC in 1 year    COUNSELING:  Kayla was counseled on STD pevention, use and side-effects of various contraceptive measures, A.C.O.G. Pap guidelines and recommendations for yearly pelvic exams in addition to recommendations for monthly self breast exams; to see her PCP for other health maintenance.

## 2024-11-20 RX ORDER — ESTRADIOL 0.1 MG/G
1 CREAM VAGINAL DAILY
Qty: 42.5 G | Refills: 6 | Status: SHIPPED | OUTPATIENT
Start: 2024-11-20 | End: 2025-11-20

## 2024-11-20 NOTE — TELEPHONE ENCOUNTER
Prescription needs to be sent in with patients email address on prescription for them to fill it. Please resend.

## 2024-11-20 NOTE — TELEPHONE ENCOUNTER
----- Message from Med Assistant Bravo sent at 11/20/2024  8:42 AM CST -----  Contact: self  Kayla Gray  MRN: [id number]  Home Phone      [phone]  Work Phone      Not on file.  Mobile          [phone]    Patient Care Team:  Shell Mckee NP as PCP - General (Family Medicine)  Ashvin Chakraborty MD as Obstetrician (Obstetrics)  Brii Pettit MD as Consulting Physician (Obstetrics and Gynecology)  Cynthia Hernadez LPN as Care Coordinator  OB? No  What phone number can you be reached at? [phone]  Message: Stated was advised to call [facility] and sign up to get Rx's.  Stated she is sign up now.

## 2024-11-22 ENCOUNTER — OFFICE VISIT (OUTPATIENT)
Dept: INTERNAL MEDICINE | Facility: CLINIC | Age: 59
End: 2024-11-22
Payer: COMMERCIAL

## 2024-11-22 VITALS
SYSTOLIC BLOOD PRESSURE: 126 MMHG | HEART RATE: 80 BPM | DIASTOLIC BLOOD PRESSURE: 84 MMHG | HEIGHT: 61 IN | BODY MASS INDEX: 25.05 KG/M2 | RESPIRATION RATE: 20 BRPM | OXYGEN SATURATION: 98 % | WEIGHT: 132.69 LBS

## 2024-11-22 DIAGNOSIS — M81.0 AGE-RELATED OSTEOPOROSIS WITHOUT CURRENT PATHOLOGICAL FRACTURE: Primary | ICD-10-CM

## 2024-11-22 PROCEDURE — 3079F DIAST BP 80-89 MM HG: CPT | Mod: CPTII,S$GLB,, | Performed by: NURSE PRACTITIONER

## 2024-11-22 PROCEDURE — 3044F HG A1C LEVEL LT 7.0%: CPT | Mod: CPTII,S$GLB,, | Performed by: NURSE PRACTITIONER

## 2024-11-22 PROCEDURE — 1159F MED LIST DOCD IN RCRD: CPT | Mod: CPTII,S$GLB,, | Performed by: NURSE PRACTITIONER

## 2024-11-22 PROCEDURE — 3008F BODY MASS INDEX DOCD: CPT | Mod: CPTII,S$GLB,, | Performed by: NURSE PRACTITIONER

## 2024-11-22 PROCEDURE — 3074F SYST BP LT 130 MM HG: CPT | Mod: CPTII,S$GLB,, | Performed by: NURSE PRACTITIONER

## 2024-11-22 PROCEDURE — 99214 OFFICE O/P EST MOD 30 MIN: CPT | Mod: S$GLB,,, | Performed by: NURSE PRACTITIONER

## 2024-11-22 PROCEDURE — 99999 PR PBB SHADOW E&M-EST. PATIENT-LVL IV: CPT | Mod: PBBFAC,,, | Performed by: NURSE PRACTITIONER

## 2024-11-22 NOTE — PROGRESS NOTES
History of Present Illness    CHIEF COMPLAINT:  Patient presents today for follow-up on osteoporosis diagnosis.    OSTEOPOROSIS MANAGEMENT:  She reports a delayed bone density scan due to inability to lie down for an extended period. She opted out of hormone replacement therapy following her hysterectomy but currently uses vaginal estrogen cream for management. She takes Vitamin D supplements regularly and attempts to take calcium supplements in the form of chews, finding them more tolerable than calcium pills. However, she experiences severe constipation with calcium supplementation, leading to difficulty consistently taking them. She incorporates calcium-rich foods and prunes into her diet as an alternative.    GASTROINTESTINAL ISSUES:  She reports severe constipation as a side effect of calcium supplements, causing difficulty with bowel movements and even leading to toilet blockages. She has tried various strategies to manage this issue, including alternating periods of taking and not taking the supplements, and trying different forms of calcium. When constipation becomes severe, she uses stool softeners for relief.    MUSCULOSKELETAL:  She reports a history of herniated disc but denies current severe back pain or limitations. A previous injection significantly improved her functionality. She expresses willingness to engage in weight-bearing exercises and consider Pilates for strengthening her back, while being mindful of her condition.      ROS:  General: -fever, -chills, -fatigue, -weight gain, -weight loss  Eyes: -vision changes, -redness, -discharge  ENT: -ear pain, -nasal congestion, -sore throat  Cardiovascular: -chest pain, -palpitations, -lower extremity edema  Respiratory: -cough, -shortness of breath  Gastrointestinal: -abdominal pain, -nausea, -vomiting, -diarrhea, +constipation, -blood in stool  Genitourinary: -dysuria, -hematuria, -frequency  Musculoskeletal: -joint pain, -muscle pain, -back  pain  Skin: -rash, -lesion  Neurological: -headache, -dizziness, -numbness, -tingling  Psychiatric: -anxiety, -depression, -sleep difficulty          Physical Exam    General: No acute distress. Well-developed. Well-nourished.  Eyes: EOMI. Sclerae anicteric.  HENT: Normocephalic. Atraumatic. Nares patent. Moist oral mucosa.  Ears: Bilateral TMs clear. Bilateral EACs clear.  Cardiovascular: Regular rate. Regular rhythm. No murmurs. No rubs. No gallops. Normal S1, S2.  Respiratory: Normal respiratory effort. Clear to auscultation bilaterally. No rales. No rhonchi. No wheezing.  Abdomen: Soft. Non-tender. Non-distended. Normoactive bowel sounds.  Musculoskeletal: No  obvious deformity.  Extremities: No lower extremity edema.  Neurological: Alert & oriented x3. No slurred speech. Normal gait.  Psychiatric: Normal mood. Normal affect. Good insight. Good judgment.  Skin: Warm. Dry. No rash.          Assessment & Plan    Diagnosed patient with significant osteoporosis at age 59  Assessed fracture risk at 12.3%  Decided on aggressive treatment approach due to patient's age and fracture risk  Considered hormone replacement therapy, but opted for alternative treatment  Recommend Prolia injections for bone density improvement    AGE-RELATED OSTEOPOROSIS:  - Explained Prolia's mechanism of action in rebuilding bone density over time.  - Discussed potential side effects of Prolia, particularly bone pain after initial administration.  - Explained importance of weight-bearing exercises in building bone density.  - Discussed significance of preventing hip fractures in the future.  - Patient to engage in weight-bearing exercises.  - Recommend starting muscle and weight training (2-3 lbs).  - Patient to consider using weighted vests during walks.  - Started Prolia injections every 6 months.  - Prolia injection to be administered every 6 months at [facility], 4th floor.  - Bone density scan ordered to be repeated in 2 years.  -  "Referred to infusion center for Prolia injection administration.    INTERVERTEBRAL DISC DISPLACEMENT:  - Recommend incorporating Pilates for back strengthening, as tolerated with herniated disc.    VITAMIN D DEFICIENCY:  - Continued vitamin D supplementation.  - Patient to focus on consuming calcium-rich foods.    CONSTIPATION:  - Discontinued calcium supplements due to severe constipation.    FOLLOW-UP:  - Follow up every 6 months for Prolia injection.  - Follow up in 2 years for repeat bone density scan.         This note was generated with the assistance of ambient listening technology. Verbal consent was obtained by the patient and accompanying visitor(s) for the recording of patient appointment to facilitate this note. I attest to having reviewed and edited the generated note for accuracy, though some syntax or spelling errors may persist. Please contact the author of this note for any clarification.      "This note will not be shared with the patient."    1. Age-related osteoporosis without current pathological fracture             Rtc as scheduled  "

## 2024-11-30 DIAGNOSIS — M54.9 BACK PAIN, UNSPECIFIED BACK LOCATION, UNSPECIFIED BACK PAIN LATERALITY, UNSPECIFIED CHRONICITY: Primary | ICD-10-CM

## 2024-12-02 RX ORDER — PREGABALIN 50 MG/1
50 CAPSULE ORAL 2 TIMES DAILY
Qty: 60 CAPSULE | Refills: 2 | Status: SHIPPED | OUTPATIENT
Start: 2024-12-02

## 2024-12-18 ENCOUNTER — INFUSION (OUTPATIENT)
Dept: INFUSION THERAPY | Facility: HOSPITAL | Age: 59
End: 2024-12-18
Attending: NURSE PRACTITIONER
Payer: COMMERCIAL

## 2024-12-18 VITALS
RESPIRATION RATE: 17 BRPM | TEMPERATURE: 98 F | SYSTOLIC BLOOD PRESSURE: 133 MMHG | HEART RATE: 74 BPM | DIASTOLIC BLOOD PRESSURE: 59 MMHG

## 2024-12-18 DIAGNOSIS — M81.0 AGE-RELATED OSTEOPOROSIS WITHOUT CURRENT PATHOLOGICAL FRACTURE: Primary | ICD-10-CM

## 2024-12-18 PROCEDURE — 96372 THER/PROPH/DIAG INJ SC/IM: CPT

## 2024-12-18 PROCEDURE — 63600175 PHARM REV CODE 636 W HCPCS: Mod: JZ,TB | Performed by: NURSE PRACTITIONER

## 2024-12-18 RX ADMIN — DENOSUMAB 60 MG: 60 INJECTION SUBCUTANEOUS at 09:12

## 2024-12-18 NOTE — PROGRESS NOTES
@Patient tolerated medication well. Reviewed discharge instructions, medications, follow-up appointments, and when to seek medical attention. Patient voiced understanding.

## 2025-04-07 ENCOUNTER — OFFICE VISIT (OUTPATIENT)
Dept: PAIN MEDICINE | Facility: CLINIC | Age: 60
End: 2025-04-07
Payer: COMMERCIAL

## 2025-04-07 VITALS
DIASTOLIC BLOOD PRESSURE: 68 MMHG | OXYGEN SATURATION: 96 % | WEIGHT: 128 LBS | HEART RATE: 79 BPM | SYSTOLIC BLOOD PRESSURE: 121 MMHG | BODY MASS INDEX: 24.17 KG/M2 | HEIGHT: 61 IN

## 2025-04-07 DIAGNOSIS — M54.9 BACK PAIN, UNSPECIFIED BACK LOCATION, UNSPECIFIED BACK PAIN LATERALITY, UNSPECIFIED CHRONICITY: ICD-10-CM

## 2025-04-07 DIAGNOSIS — M47.816 LUMBAR FACET ARTHROPATHY: ICD-10-CM

## 2025-04-07 DIAGNOSIS — M48.061 NEUROFORAMINAL STENOSIS OF LUMBAR SPINE: ICD-10-CM

## 2025-04-07 DIAGNOSIS — M54.16 LUMBAR RADICULITIS: Primary | ICD-10-CM

## 2025-04-07 DIAGNOSIS — M54.16 RIGHT LUMBAR RADICULOPATHY: ICD-10-CM

## 2025-04-07 DIAGNOSIS — M51.26 LUMBAR HERNIATED DISC: ICD-10-CM

## 2025-04-07 PROCEDURE — 4010F ACE/ARB THERAPY RXD/TAKEN: CPT | Mod: CPTII,S$GLB,, | Performed by: ANESTHESIOLOGY

## 2025-04-07 PROCEDURE — 99999 PR PBB SHADOW E&M-EST. PATIENT-LVL IV: CPT | Mod: PBBFAC,,, | Performed by: ANESTHESIOLOGY

## 2025-04-07 PROCEDURE — 3074F SYST BP LT 130 MM HG: CPT | Mod: CPTII,S$GLB,, | Performed by: ANESTHESIOLOGY

## 2025-04-07 PROCEDURE — 99214 OFFICE O/P EST MOD 30 MIN: CPT | Mod: S$GLB,,, | Performed by: ANESTHESIOLOGY

## 2025-04-07 PROCEDURE — 3078F DIAST BP <80 MM HG: CPT | Mod: CPTII,S$GLB,, | Performed by: ANESTHESIOLOGY

## 2025-04-07 PROCEDURE — 1160F RVW MEDS BY RX/DR IN RCRD: CPT | Mod: CPTII,S$GLB,, | Performed by: ANESTHESIOLOGY

## 2025-04-07 PROCEDURE — 3008F BODY MASS INDEX DOCD: CPT | Mod: CPTII,S$GLB,, | Performed by: ANESTHESIOLOGY

## 2025-04-07 PROCEDURE — 1159F MED LIST DOCD IN RCRD: CPT | Mod: CPTII,S$GLB,, | Performed by: ANESTHESIOLOGY

## 2025-04-07 RX ORDER — PREGABALIN 50 MG/1
50 CAPSULE ORAL 2 TIMES DAILY
Qty: 60 CAPSULE | Refills: 5 | Status: SHIPPED | OUTPATIENT
Start: 2025-04-07

## 2025-04-07 NOTE — PROGRESS NOTES
EST patient evaluation  [facility]    Kayla Gray  : 1965  Date: 2025     CHIEF COMPLAINT:  Low-back Pain and Follow-up    Referring Physician: No ref. provider found  Primary Care Physician: Shell Mckee NP    HPI:  This is a 60 y.o. female with a chief complaint of Low-back Pain and Follow-up  . The patient has Past medical history/Past surgical history of asthma, hyperlipidemia, Long term blood thinner, avascular necrosis of the right hip    Patient was evaluated and referred by primary care provider for low back pain  X-ray lumbar spine obtained in 2024 showed no abnormal flexion-extension movement with a disc space narrowing at L5-S1 and facet arthropathy.    No previous MRI lumbar spine  She was started on Lyrica 25 mg b.i.d. in 2024 in addition to Flexeril as needed.  She was seen by orthopedic surgeon 2024 who rule out her right hip pathology causing her symptoms by performing right hip MRI.  Low back pain started in December, she started to see a chiropractor in February and this is when all the hip pain started. Numbness to lower extremity/foot started about 3 weeks ago.     Interval History 2024:  Kayla Gray is here for follow up visit after HER INITIAL EVALUATION IN .  She is here to discuss her MRI.  MRI bilateral hip  showed no sign of avascular necrosis.    X-ray bilateral hip showed mild degenerative changes  MRI lumbar spine showed At L5-S1 right sub articular foramina disc extrusion contributing to right lateral recess stenosis with disc approximating the exiting right S1 nerve root.  She Could not tolerate 1 dose of Lyrica 25 mg q.h.s.  Current pain score: 7/10    Interval History 2024:  Kayla Gray is here for follow up visit after right L5 and S1 transforaminal epidural steroid injection in 2024, patient reported 50% improvement in pain and  functionality in the first week only after injection, however  she reported 100% relief in her right lower extremity numbness.  She continues to take Lyrica as prescribed  Current pain score: 6/10    Interval History 08/19/2024:  Kayla Gray is here for procedure follow up visit after lumbar epidural steroid injection at L5-S1, patient reported 95% relief after injection and improvement in functionality.  Current pain score: 0/10  Interval History 04/07/2025:  Kayla Gray is here for procedure follow up visit after 8 months from lumbar epidural steroid injection,  patient reported 100 % improvement in pain and functionality.   Current pain score: 0/10    Diabetic: No    Anticoagulation drugs: Plavix     Allergy To Iodine: No    Currently on Antibiotic: No    Current Description of Pain Symptoms:    History of Recent Fall or Trauma: No   Onset: Chronic, started December 2023, right hip pain started 04/2024 and leg and feet for 05/2024  Pain Location:  Right buttock, right hip, no groin pain, no tenderness over the SI joint or GTB  Radiates/associated symptoms: down RLE to foot, no left LE  The pain is described as aching, numbing, and stabbing(Right leg is numb from knee down to foot: Improving)  Exacerbating factors: Sitting, Laying, riding in car.  Mitigating factors standing and moving.   Symptoms interfere with daily activity, sleeping.   The patient feels like symptoms have been Improving  on physical exam she has 5/5 muscle power in bilateral lower extremities    Pain score:   Best: 3/10  Worst: 10/10    Current pain medications:    pregabalin (LYRICA) 50 MG capsule, QHS    Current Narcotics/Opioid /benzo Medications:  Opioids- None  Benzodiazepines: No    UDS:  NA    PDMP:  Reviewed and consistent with medication use as prescribed.     Previous Chronic Pain Treatment History:  Physical Therapy/HEP/Physician Lead Exercise Program:  [x]Chiropractor from April 2024 to July 20, 2024, +  10 sessions- with no relief    Non-interventional Pain Therapy:  [x]Chiropractor from 2024 to May 20, 2024, + 10 sessions  []Acupuncture/Dry needle.  []TENS unit.  []Heat/ICE.  []Back Brace.    Medications previously tried:  NSAIDs: None Allergic to ASA, her cardiology place her on Plavix  Topical Agent: No  TCA/SSRI/SNRI: None  Anti-convulsants: Lyrica   Muscle Relaxants: Flexeril: tachycardia    Opioids- Oxycodone with Acetaminophen (Percocet).    Interventional Pain Procedures:  right L5 and S1 transforaminal epidural steroid injection  - 50% pain relief for pain, and 100% relief for the numbness  2024 LESI at L5-S1 >90%  Improvement in pain and functionality. 100%relief    Previous spine/Relevant joint surgery:  none  Surgical History:   has a past surgical history that includes  section, classic; [place] tooth extraction (2017); Bilateral salpingoophorectomy (2017); Abdominal mass resection (2017); pr removal of ovary/tube(s); Hysterectomy (2017); Transforaminal epidural injection of steroid (Right, 2024); and Epidural steroid injection into lumbar spine (N/A, 2024).  Medical History:   has a past medical history of Asthma, Avascular necrosis of bone of right hip, Esophageal dilatation, GERD (gastroesophageal reflux disease), Hiatal hernia, and Yeast infection.  Family History:  family history includes Breast cancer in her paternal grandmother; Cancer in her maternal grandfather and paternal grandmother; Heart disease in her father and paternal grandmother.  Allergies:  Asa [aspirin] and Codeine   Social History/SUBSTANCE ABUSE HISTORY:  Personal history of substance abuse: No   reports that she has never smoked. She has never been exposed to tobacco smoke. She has never used smokeless tobacco. She reports that she does not drink alcohol and does not use drugs.  LABS:  CBC  Lab Results   Component Value Date    WBC 8.82 2024    HGB 13.7 2024     "HCT 40.8 05/17/2024     Coagulation Profile   Lab Results   Component Value Date     05/17/2024     No results found for: "PT", "PTT", "INR"  CMP:  BMP  Lab Results   Component Value Date     05/17/2024    K 4.2 05/17/2024     05/17/2024    CO2 27 05/17/2024    BUN 14 05/17/2024    CREATININE 0.8 05/17/2024    CALCIUM 10.0 05/17/2024    ANIONGAP 11 05/17/2024    EGFRNORACEVR >60 05/17/2024     Lab Results   Component Value Date    ALT 41 05/17/2024    AST 24 05/17/2024    ALKPHOS 82 05/17/2024    BILITOT 0.4 05/17/2024     HGBA1C:  Lab Results   Component Value Date    HGBA1C 5.9 (H) 05/17/2024     ROS:    Review of Systems   GENERAL:  No weight loss, malaise or fevers.  HEENT:   No recent changes in vision or hearing  NECK:  Negative for lumps, no difficulty with swallowing.  RESPIRATORY:  Negative for cough, wheezing or shortness of breath, patient denies any recent URI.  CARDIOVASCULAR:  Negative for chest pain or palpitations.  GI:  Negative for abdominal discomfort, blood in stools or black stools or change in bowel habits.  MUSCULOSKELETAL:  See HPI.  SKIN:  Negative for lesions, rash, and itching.  PSYCH:  No mood disorder or recent psychosocial stressors.   HEMATOLOGY/LYMPHOLOGY:  See the blood thinner sectioned in HPI.  NEURO:  See HPI  All other reviewed and negative other than HPI.    PHYSICAL EXAM:  VITALS: /68 (BP Location: Left arm, Patient Position: Sitting)   Pulse 79   Ht 5' 1" (1.549 m)   Wt 58.1 kg (128 lb)   LMP 03/24/2017   SpO2 96%   BMI 24.19 kg/m²   Body mass index is 24.19 kg/m².  GENERAL: Well appearing, in no acute distress, alert and oriented x3, answers questions appropriately.   PSYCH: Flat affect.  SKIN: Skin color, texture, turgor normal, no rashes or lesions.  HEAD/FACE:  Normocephalic, atraumatic. Cranial nerves grossly intact.  CV: Regular rate  PULM: No evidence of respiratory difficulty, symmetric chest rise.  GI:  Soft and " non-Distended.  BACK/SIJ/HIP:  Lumbar Spine Exam:       Inspection: No erythema, bruising.       Palpation: (-) TTP of lumbar paraspinals bilaterally      ROM:  Limited in flexion, extension, lateral bending.       (-) Facet loading bilaterally      (+) Straight Leg Raise, right lower extremity++, very significant      (-) negative tenderness for right posterior superior iliac spine.  Hip Exam:      Inspection: No gross deformity or apparent leg length discrepancy      Palpation:  No TTP to bilateral greater trochanteric bursas.       ROM:  Positive limitation due to pain on external rotation of the right hip in compared to the left  Neurologic Exam:     Alert. Speech is fluent and appropriate.     Strength: 5/5 in bilateral hip flexion and knee extension     Sensation:  Grossly intact to light touch in bilateral lower extremities     Tone: No abnormality appreciated in bilateral lower extremities    GAIT:  Antalgic gait, unsteady gait.    DIAGNOSTIC STUDIES AND MEDICAL RECORDS REVIEW:  XR LUMBAR SPINE AP AND LAT WITH FLEX/EXT 2024  Alignment: Mild dextroscoliotic curvature.  No abnormal movement with flexion or extension to suggest ligamentous instability.  Vertebrae: Vertebral body heights are maintained.  No suspicious appearing lytic or blastic lesions.  Discs and facets: Disc space narrowing at L5-S1. Facet arthropathy at L5-S1.    XR HIPS BILATERAL 2 VIEW INCL AP PELVIS 2024  Bone mineralization is maintained.  There are mild degenerate changes of the bilateral sacroiliac joints, hip joints and pubic symphysis.  The femoral heads demonstrate a normal spherical contour without evidence for   avascular necrosis.  No fracture or dislocation.    MR I bilateral hip was no sign of avascular necrosis    MRI lumbar spine 2024 showed At L5-S1 right sub articular foramina disc extrusion contributing to right lateral recess stenosis with disc approximating the exiting right S1 nerve root.    ASSESSMENT:  Kayla  Pascale Gray is a 60 y.o. female with the following diagnoses based on history, exam, and imagin. Lumbar radiculitis    2. Right lumbar radiculopathy    3. Lumbar herniated disc    4. Lumbar facet arthropathy    5. Neuroforaminal stenosis of lumbar spine    This is a pleasant 60 y.o. female patient with PMH/PSH of asthma, hyperlipidemia, long-term anticoagulation on Plavix presenting with right hip pain and right lower extremity  pain andnumbness that has been worsening since , she has completed multiple sessions of chiropractor sessions exercise between 2024 and  with no significant relief,  she has a MRI lumbar spine   showed At L5-S1 right sub articular foramina disc extrusion contributing to right lateral recess stenosis with disc approximating the exiting right S1 nerve root Which correlates with her symptoms,  she had a right L5 and S1 transforaminal epidural steroid injection with relief her numbness but did not effect her pain significantly,  followed by lumbar epidural steroid injection at right-sided L5-S1, patient reported significant improvement in pain functionality, she is very happy and satisfied with the current pain management plan.    Treatment Plan:    Diagnostics/Referrals:  NA    Medications:  NSAIDs:  Can not have due to blood thinners.  Topical Agent: No  TCA/SSRI/SNRI: None  Anti-convulsants: Lyrica - per PCP she was  instructed to discontinue Lyrica if she has no significant relief from it  Muscle Relaxants: None  Opioids: None    Interventional Therapy: may consider repeating lumbar epidural steroid injection at L5-S1- Right sided   Sedation:  oral sedation  Clearance to stop Blood thinner: Plavix 7 days.    Regarding the above interventions, the patient has been educated regarding the risks (including bleeding, infection, increased pain, nerve damage, or allergic reaction), benefits, and alternatives. The patient states she understands and  is eager to proceed.     Continue Physical Rehabilitation:  continue with a home therapy    Follow-up: RTC PRN    May consider: LESI( right-sided)    Monique Josue MD  Anesthesiologist  Interventional Pain Medicine  04/07/2025    Disclaimer:  This note was prepared using voice recognition system and is likely to have sound alike errors that may have been overlooked even after proof reading.  Please call me with any questions.

## 2025-04-07 NOTE — PROGRESS NOTES
EST patient evaluation  [facility]    Kayla Gray  : 1965  Date: 2025     CHIEF COMPLAINT:  No chief complaint on file.    Referring Physician: No ref. provider found  Primary Care Physician: Shell Mckee NP    HPI:  This is a 60 y.o. female with a chief complaint of No chief complaint on file.  . The patient has Past medical history/Past surgical history of asthma, hyperlipidemia, Long term blood thinner, avascular necrosis of the right hip    Patient was evaluated and referred by primary care provider for low back pain  X-ray lumbar spine obtained in 2024 showed no abnormal flexion-extension movement with a disc space narrowing at L5-S1 and facet arthropathy.    No previous MRI lumbar spine  She was started on Lyrica 25 mg b.i.d. in 2024 in addition to Flexeril as needed.  She was seen by orthopedic surgeon 2024 who rule out her right hip pathology causing her symptoms by performing right hip MRI.  Low back pain started in December, she started to see a chiropractor in February and this is when all the hip pain started. Numbness to lower extremity/foot started about 3 weeks ago.     Interval History 2024:  Kayla Gray is here for follow up visit after HER INITIAL EVALUATION IN .  She is here to discuss her MRI.  MRI bilateral hip  showed no sign of avascular necrosis.    X-ray bilateral hip showed mild degenerative changes  MRI lumbar spine showed At L5-S1 right sub articular foramina disc extrusion contributing to right lateral recess stenosis with disc approximating the exiting right S1 nerve root.  She Could not tolerate 1 dose of Lyrica 25 mg q.h.s.  Current pain score: 7/10    Interval History 2024:  Kayla Gray is here for follow up visit after right L5 and S1 transforaminal epidural steroid injection in 2024, patient reported 50% improvement in pain and  functionality in the first week only after injection, however  she reported 100% relief in her right lower extremity numbness.  She continues to take Lyrica as prescribed  Current pain score: 6/10    Interval History 08/19/2024:  Kayla Gray is here for procedure follow up visit after lumbar epidural steroid injection at L5-S1, patient reported 95% relief after injection and improvement in functionality.  Current pain score: 0/10  Interval History 04/07/2025:  Kayla Gray is here for procedure follow up visit after 8 months from lumbar epidural steroid injection,  patient reported *** % improvement in pain and functionality for***  Current pain score: ***/10      Diabetic: No    Anticogualtion drugs: Plavix     Allergy To Iodine: No    Currently on Antibiotic: No    Current Description of Pain Symptoms:    History of Recent Fall or Trauma: No   Onset: Chronic, started December 2023, right hip pain started 04/2024 and leg and feet for 05/2024  Pain Location:  Right buttock, right hip, no groin pain, no tenderness over the SI joint or GTB  Radiates/associated symptoms: down RLE to foot, no left LE  The pain is described as aching, numbing, and stabbing(Right leg is numb from knee down to foot: Improving)  Exacerbating factors: Sitting, Laying, riding in car.  Mitigating factors standing and moving.   Symptoms interfere with daily activity, sleeping.   The patient feels like symptoms have beenImproving  on physical exam she has 5/5 muscle power in bilateral lower extremities    Pain score:   Best: 3/10  Worst: 10/10    Current pain medications:    pregabalin (LYRICA) 50 MG capsule,  b.I.d.    Current Narcotics/Opioid /benzo Medications:  Opioids- None  Benzodiazepines: No    UDS:  NA    PDMP:  Reviewed and consistent with medication use as prescribed.     Previous Chronic Pain Treatment History:  Physical Therapy/HEP/Physician Lead Exercise Program:  [x]Chiropractor from April 2024 to July  2024, + 10 sessions- with no relief   I  Non-interventional Pain Therapy:  [x]Chiropractor from 2024 to May 20, 2024, + 10 sessions  []Acupuncture/Dry needle.  []TENS unit.  []Heat/ICE.  []Back Brace.    Medications previously tried:  NSAIDs: None Allergic to ASA, her cardiology place her on Plavix  Topical Agent: No  TCA/SSRI/SNRI: None  Anti-convulsants: Lyrica   Muscle Relaxants: Flexeril: tachycardia    Opioids- Oxycodone with Acetaminophen (Percocet).    Interventional Pain Procedures:  right L5 and S1 transforaminal epidural steroid injection  - 50% pain relief for pain, and 100% relief for the numbness  2024 LESI at L5-S1 >90%  Improvement in pain and functionality.    Previous spine/Relevant joint surgery:  none  Surgical History:   has a past surgical history that includes  section, classic; [place] tooth extraction (2017); Bilateral salpingoophorectomy (2017); Abdominal mass resection (2017); pr removal of ovary/tube(s); Hysterectomy (2017); Transforaminal epidural injection of steroid (Right, 2024); and Epidural steroid injection into lumbar spine (N/A, 2024).  Medical History:   has a past medical history of Asthma, Avascular necrosis of bone of right hip, Esophageal dilatation, GERD (gastroesophageal reflux disease), Hiatal hernia, and Yeast infection.  Family History:  family history includes Breast cancer in her paternal grandmother; Cancer in her maternal grandfather and paternal grandmother; Heart disease in her father and paternal grandmother.  Allergies:  Asa [aspirin] and Codeine   Social History/SUBSTANCE ABUSE HISTORY:  Personal history of substance abuse: No   reports that she has never smoked. She has never been exposed to tobacco smoke. She has never used smokeless tobacco. She reports that she does not drink alcohol and does not use drugs.  LABS:  CBC  Lab Results   Component Value Date    WBC 8.82 2024    HGB 13.7 2024  "   HCT 40.8 05/17/2024     Coagulation Profile   Lab Results   Component Value Date     05/17/2024     No results found for: "PT", "PTT", "INR"  CMP:  BMP  Lab Results   Component Value Date     05/17/2024    K 4.2 05/17/2024     05/17/2024    CO2 27 05/17/2024    BUN 14 05/17/2024    CREATININE 0.8 05/17/2024    CALCIUM 10.0 05/17/2024    ANIONGAP 11 05/17/2024    EGFRNORACEVR >60 05/17/2024     Lab Results   Component Value Date    ALT 41 05/17/2024    AST 24 05/17/2024    ALKPHOS 82 05/17/2024    BILITOT 0.4 05/17/2024     HGBA1C:  Lab Results   Component Value Date    HGBA1C 5.9 (H) 05/17/2024     ROS:    Review of Systems   GENERAL:  No weight loss, malaise or fevers.  HEENT:   No recent changes in vision or hearing  NECK:  Negative for lumps, no difficulty with swallowing.  RESPIRATORY:  Negative for cough, wheezing or shortness of breath, patient denies any recent URI.  CARDIOVASCULAR:  Negative for chest pain or palpitations.  GI:  Negative for abdominal discomfort, blood in stools or black stools or change in bowel habits.  MUSCULOSKELETAL:  See HPI.  SKIN:  Negative for lesions, rash, and itching.  PSYCH:  No mood disorder or recent psychosocial stressors.   HEMATOLOGY/LYMPHOLOGY:  See the blood thinner sectioned in HPI.  NEURO:  See HPI  All other reviewed and negative other than HPI.    PHYSICAL EXAM:  VITALS: LMP 03/24/2017   There is no height or weight on file to calculate BMI.  GENERAL: Well appearing, in no acute distress, alert and oriented x3, answers questions appropriately.   PSYCH: Flat affect.  SKIN: Skin color, texture, turgor normal, no rashes or lesions.  HEAD/FACE:  Normocephalic, atraumatic. Cranial nerves grossly intact.  CV: Regular rate  PULM: No evidence of respiratory difficulty, symmetric chest rise.  GI:  Soft and non-Distended.  BACK/SIJ/HIP:  Lumbar Spine Exam:       Inspection: No erythema, bruising.       Palpation: (-) TTP of lumbar paraspinals bilaterally   "    ROM:  Limited in flexion, extension, lateral bending.       (-) Facet loading bilaterally      (+) Straight Leg Raise, right lower extremity++, very significant      (-) negative tenderness for right posterior superior iliac spine.  Hip Exam:      Inspection: No gross deformity or apparent leg length discrepancy      Palpation:  No TTP to bilateral greater trochanteric bursas.       ROM:  Positive limitation due to pain on external rotation of the right hip in compared to the left  Neurologic Exam:     Alert. Speech is fluent and appropriate.     Strength: 5/5 in bilateral hip flexion and knee extension     Sensation:  Grossly intact to light touch in bilateral lower extremities     Tone: No abnormality appreciated in bilateral lower extremities    GAIT:  Antalgic gait, unsteady gait.    DIAGNOSTIC STUDIES AND MEDICAL RECORDS REVIEW:  XR LUMBAR SPINE AP AND LAT WITH FLEX/EXT 2024  Alignment: Mild dextroscoliotic curvature.  No abnormal movement with flexion or extension to suggest ligamentous instability.  Vertebrae: Vertebral body heights are maintained.  No suspicious appearing lytic or blastic lesions.  Discs and facets: Disc space narrowing at L5-S1. Facet arthropathy at L5-S1.      XR HIPS BILATERAL 2 VIEW INCL AP PELVIS 2024  Bone mineralization is maintained.  There are mild degenerate changes of the bilateral sacroiliac joints, hip joints and pubic symphysis.  The femoral heads demonstrate a normal spherical contour without evidence for   avascular necrosis.  No fracture or dislocation.    MR I bilateral hip was no sign of avascular necrosis    MRI lumbar spine 2024 showed At L5-S1 right sub articular foramina disc extrusion contributing to right lateral recess stenosis with disc approximating the exiting right S1 nerve root.    ASSESSMENT:  Kayla Gray is a 60 y.o. female with the following diagnoses based on history, exam, and imaging:  There are no diagnoses linked to this  encounter.  --------------------------------------------------------  This is a pleasant 60 y.o. female patient with PMH/PSH of asthma, hyperlipidemia, long-term anticoagulation on Plavix presenting with right hip pain and right lower extremity  pain andnumbness that has been worsening since December, 2023, she has completed multiple sessions of chiropractor sessions exercise between February 2024 and April, 2024 with no significant relief,  she has a MRI lumbar spine 2024  showed At L5-S1 right sub articular foramina disc extrusion contributing to right lateral recess stenosis with disc approximating the exiting right S1 nerve root Which correlates with her symptoms,  she had a right L5 and S1 transforaminal epidural steroid injection with relief her numbness but did not effect her pain significantly,  followed by lumbar epidural steroid injection at right-sided L5-S1, patient reported significant improvement in pain functionality, she is very happy and satisfied with the current pain management plan.    Treatment Plan:    Diagnostics/Referrals:  patient had neurosurgery evaluation; recommended interventional procedure.    Medications:  NSAIDs:  Can not have due to blood thinners.  Topical Agent: No  TCA/SSRI/SNRI: None  Anti-convulsants: Lyrica - per PCP  Muscle Relaxants: None  Opioids: None    Interventional Therapy: may consider repeating  lumbar epidural steroid injection at L5-S1- Right sided   Sedation:  oral sedation  Clearance to stop Blood thinner: Plavix 7 days.    Regarding the above interventions, the patient has been educated regarding the risks (including bleeding, infection, increased pain, nerve damage, or allergic reaction), benefits, and alternatives. The patient states she understands and is eager to proceed.     Continue Physical Rehabilitation: physical therapy    Patient Education: Counseled patient regarding the importance of activity modification, I have stressed the importance of physical  activity and a home exercise plan to help with pain and improve health.    Follow-up: RTC  six-month    May consider: LESI( right-sided)    Monique Josue MD  Anesthesiologist  Interventional Pain Medicine  04/07/2025    Disclaimer:  This note was prepared using voice recognition system and is likely to have sound alike errors that may have been overlooked even after proof reading.  Please call me with any questions.

## 2025-06-25 ENCOUNTER — INFUSION (OUTPATIENT)
Dept: INFUSION THERAPY | Facility: HOSPITAL | Age: 60
End: 2025-06-25
Attending: NURSE PRACTITIONER
Payer: COMMERCIAL

## 2025-06-25 VITALS
TEMPERATURE: 98 F | DIASTOLIC BLOOD PRESSURE: 63 MMHG | SYSTOLIC BLOOD PRESSURE: 118 MMHG | HEART RATE: 70 BPM | RESPIRATION RATE: 18 BRPM

## 2025-06-25 DIAGNOSIS — M81.0 AGE-RELATED OSTEOPOROSIS WITHOUT CURRENT PATHOLOGICAL FRACTURE: Primary | ICD-10-CM

## 2025-06-25 PROCEDURE — 96372 THER/PROPH/DIAG INJ SC/IM: CPT

## 2025-06-25 PROCEDURE — 63600175 PHARM REV CODE 636 W HCPCS: Mod: JZ,TB | Performed by: NURSE PRACTITIONER

## 2025-06-25 RX ADMIN — DENOSUMAB 60 MG: 60 INJECTION SUBCUTANEOUS at 09:06

## (undated) DEVICE — DRAPE COLUMN DAVINCI XI

## (undated) DEVICE — CHLORAPREP 10.5 ML APPLICATOR

## (undated) DEVICE — DRAPE ARM DAVINCI XI

## (undated) DEVICE — COVER TIP CURVED SCISSORS XI

## (undated) DEVICE — JELLY KY LUBRICATING 5G PACKET

## (undated) DEVICE — APPLICATOR LAPAROSCOPIC EXTEND

## (undated) DEVICE — SOL CLEARIFY VISUALIZATION LAP

## (undated) DEVICE — HEMOSTAT VITAGEL RT 4.5

## (undated) DEVICE — SOL WATER STRL IRR 1000ML

## (undated) DEVICE — NDL INSUF ULTRA VERESS 120MM

## (undated) DEVICE — NDL TUOHY EPIDURAL 20GX3.5IN

## (undated) DEVICE — SUT MCRYL PLUS 4-0 PS2 27IN

## (undated) DEVICE — PORT ACCESS 8MM W/120MM LOW

## (undated) DEVICE — SET TRI-LUMEN FILTERED TUBE

## (undated) DEVICE — GLOVE BIOGEL SKINSENSE PI 7.5

## (undated) DEVICE — KIT NERVE BLOCK PREP BAPTIST

## (undated) DEVICE — MARKER SKIN RULER AND LABEL

## (undated) DEVICE — SUT VICRYL 3-0 27 SH

## (undated) DEVICE — CLOSURE SKIN STERI STRIP 1/2X4

## (undated) DEVICE — INSERT CUSHIONPRONE VIEW LARGE

## (undated) DEVICE — SEE MEDLINE ITEM 152622

## (undated) DEVICE — ELECTRODE REM PLYHSV RETURN 9

## (undated) DEVICE — GLOVE BIOGEL SKINSENSE PI 6.5

## (undated) DEVICE — SEAL UNIVERSAL 5MM-8MM XI

## (undated) DEVICE — DRESSING TELFA N ADH 3X8

## (undated) DEVICE — UNDERGLOVES BIOGEL PI SZ 7 LF

## (undated) DEVICE — DRESSING LEUKOPLAST FLEX 1X3IN

## (undated) DEVICE — POSITIONER HEAD ADULT

## (undated) DEVICE — SYR 10CC LUER LOCK

## (undated) DEVICE — PAD PERI POST REPLACEMNT

## (undated) DEVICE — KIT HEMOSTAT 4.5ML VITAGEL

## (undated) DEVICE — GLOVE PROTEXIS NEOPRENE 7.5

## (undated) DEVICE — SOL ELECTROLUBE ANTI-STIC

## (undated) DEVICE — SOL NS 1000CC

## (undated) DEVICE — SEE MEDLINE ITEM 156923

## (undated) DEVICE — IRRIGATOR ENDOSCOPY DISP.

## (undated) DEVICE — SUT VICRYL 0 27 CT-2

## (undated) DEVICE — KIT WING PAD POSITIONING

## (undated) DEVICE — MANIPULATOR VCARE PLUS 37MM LG

## (undated) DEVICE — OBTURATOR BLADELESS 8MM XI